# Patient Record
Sex: MALE | Race: WHITE | Employment: FULL TIME | URBAN - METROPOLITAN AREA
[De-identification: names, ages, dates, MRNs, and addresses within clinical notes are randomized per-mention and may not be internally consistent; named-entity substitution may affect disease eponyms.]

---

## 2018-09-10 ENCOUNTER — HOSPITAL ENCOUNTER (EMERGENCY)
Age: 28
Discharge: HOME | End: 2018-09-10
Payer: MEDICARE

## 2018-09-10 ENCOUNTER — APPOINTMENT (OUTPATIENT)
Dept: CT IMAGING | Age: 28
End: 2018-09-10
Payer: MEDICARE

## 2018-09-10 VITALS
HEIGHT: 65 IN | BODY MASS INDEX: 24.16 KG/M2 | HEART RATE: 72 BPM | DIASTOLIC BLOOD PRESSURE: 98 MMHG | OXYGEN SATURATION: 98 % | TEMPERATURE: 98.2 F | RESPIRATION RATE: 16 BRPM | SYSTOLIC BLOOD PRESSURE: 149 MMHG | WEIGHT: 145 LBS

## 2018-09-10 DIAGNOSIS — S09.90XA CLOSED HEAD INJURY, INITIAL ENCOUNTER: Primary | ICD-10-CM

## 2018-09-10 PROCEDURE — 70450 CT HEAD/BRAIN W/O DYE: CPT

## 2018-09-10 PROCEDURE — 99284 EMERGENCY DEPT VISIT MOD MDM: CPT

## 2018-09-10 ASSESSMENT — PAIN DESCRIPTION - LOCATION: LOCATION: HEAD

## 2018-09-10 ASSESSMENT — PAIN DESCRIPTION - FREQUENCY: FREQUENCY: CONTINUOUS

## 2018-09-10 ASSESSMENT — PAIN DESCRIPTION - PAIN TYPE: TYPE: ACUTE PAIN

## 2018-09-10 ASSESSMENT — PAIN DESCRIPTION - ORIENTATION: ORIENTATION: RIGHT

## 2018-09-10 ASSESSMENT — PAIN DESCRIPTION - DESCRIPTORS: DESCRIPTORS: THROBBING

## 2018-09-10 ASSESSMENT — PAIN SCALES - GENERAL: PAINLEVEL_OUTOF10: 4

## 2018-09-10 NOTE — ED PROVIDER NOTES
Interpretation: Normal.    Constitutional: Level of Consciousness: Awake and alert, cooperative. ETOH: No.               Distress: none. Head:  Traumatic:  no.                  Scalp Tenderness:  Right frontal rtegion. Deformity: no.               Skin:  Cephalohematoma small right frontal.  Eyes:  PERRL, EOMI. No periorbital ecchymoses. Conjunctiva: normal.  Ears:  External ears without lesions. Throat:  Airway patient. Neck:  Supple. No midline tenderness, full ROM. Chest:  Symmetrical without visible rash or tenderness. Respiratory:  Clear to auscultation and breath sounds equal.  CV:  Regular rate and rhythm, normal heart sounds, without pathological murmurs. GI:  Abdomen Soft, nontender. No abrasions, ecchymoses, or lacerations. Back:  No costovertebral, paravertebral, intervertebral, or vertebral tenderness or spasm. Integument:  No abrasions, ecchymoses, or lacerations unless noted elsewhere. Extremities  No tenderness or swelling. Normal, painless range of motion. No neurovascular deficit. Neurological:  Oriented x 3,  GCS15. Motor functions intact. Lab / Imaging Results   (All laboratory and radiology results have been personally reviewed by myself)  Labs:  No results found for this visit on 09/10/18. Imaging: All Radiology results interpreted by Radiologist unless otherwise noted. CT Head WO Contrast   Final Result   ALERT:  THIS IS AN ABNORMAL REPORT   1. No CT evidence of acute intracranial abnormality, as questioned. 2. Blastic focus right supraorbital calvarium measuring as above,   thought to be relatively stable when compared with the previous exam,   possibly reflective of bone island/bone an ostosis. Other etiologies   are not completely excluded. Continued surveillance recommended. 3. Mucosal disease bilateral maxillary sinuses. ED Course / Medical Decision Making   Medications - No data to display       Counseling:     The emergency provider has spoken with the patient and discussed todays results, in addition to providing specific details for the plan of care and counseling regarding the diagnosis and prognosis. Questions are answered at this time and they are agreeable with the plan. Assessment      1. Closed head injury, initial encounter      Plan   Discharge to home  Patient condition is good    New Medications     New Prescriptions    No medications on file     Electronically signed by RADU Leiva   DD: 9/10/18  **This report was transcribed using voice recognition software. Every effort was made to ensure accuracy; however, inadvertent computerized transcription errors may be present.   END OF ED PROVIDER NOTE         RADU Moya  09/10/18 [id number]

## 2019-08-17 ENCOUNTER — HOSPITAL ENCOUNTER (EMERGENCY)
Age: 29
Discharge: HOME | End: 2019-08-17
Attending: EMERGENCY MEDICINE

## 2019-08-17 VITALS
BODY MASS INDEX: 23.32 KG/M2 | WEIGHT: 140 LBS | OXYGEN SATURATION: 98 % | SYSTOLIC BLOOD PRESSURE: 139 MMHG | DIASTOLIC BLOOD PRESSURE: 100 MMHG | HEIGHT: 65 IN | TEMPERATURE: 97.3 F | HEART RATE: 76 BPM | RESPIRATION RATE: 17 BRPM

## 2019-08-17 DIAGNOSIS — R13.10 DYSPHAGIA, UNSPECIFIED TYPE: Primary | ICD-10-CM

## 2019-08-17 LAB
ALBUMIN SERPL-MCNC: 5.2 G/DL (ref 3.5–5.2)
ALP BLD-CCNC: 95 U/L (ref 40–129)
ALT SERPL-CCNC: 226 U/L (ref 0–40)
ANION GAP SERPL CALCULATED.3IONS-SCNC: 18 MMOL/L (ref 7–16)
AST SERPL-CCNC: 170 U/L (ref 0–39)
BASOPHILS ABSOLUTE: 0.01 E9/L (ref 0–0.2)
BASOPHILS RELATIVE PERCENT: 0.2 % (ref 0–2)
BILIRUB SERPL-MCNC: 0.9 MG/DL (ref 0–1.2)
BILIRUBIN URINE: ABNORMAL
BLOOD, URINE: NEGATIVE
BUN BLDV-MCNC: 10 MG/DL (ref 6–20)
CALCIUM SERPL-MCNC: 10.5 MG/DL (ref 8.6–10.2)
CHLORIDE BLD-SCNC: 95 MMOL/L (ref 98–107)
CLARITY: CLEAR
CO2: 25 MMOL/L (ref 22–29)
COLOR: YELLOW
CREAT SERPL-MCNC: 0.7 MG/DL (ref 0.7–1.2)
EOSINOPHILS ABSOLUTE: 0.02 E9/L (ref 0.05–0.5)
EOSINOPHILS RELATIVE PERCENT: 0.4 % (ref 0–6)
GFR AFRICAN AMERICAN: >60
GFR NON-AFRICAN AMERICAN: >60 ML/MIN/1.73
GLUCOSE BLD-MCNC: 75 MG/DL (ref 74–99)
GLUCOSE URINE: NEGATIVE MG/DL
HCT VFR BLD CALC: 43.3 % (ref 37–54)
HEMOGLOBIN: 14.7 G/DL (ref 12.5–16.5)
IMMATURE GRANULOCYTES #: 0.01 E9/L
IMMATURE GRANULOCYTES %: 0.2 % (ref 0–5)
KETONES, URINE: >=80 MG/DL
LEUKOCYTE ESTERASE, URINE: NEGATIVE
LYMPHOCYTES ABSOLUTE: 0.86 E9/L (ref 1.5–4)
LYMPHOCYTES RELATIVE PERCENT: 16.4 % (ref 20–42)
MCH RBC QN AUTO: 32.7 PG (ref 26–35)
MCHC RBC AUTO-ENTMCNC: 33.9 % (ref 32–34.5)
MCV RBC AUTO: 96.4 FL (ref 80–99.9)
MONOCYTES ABSOLUTE: 0.5 E9/L (ref 0.1–0.95)
MONOCYTES RELATIVE PERCENT: 9.5 % (ref 2–12)
NEUTROPHILS ABSOLUTE: 3.85 E9/L (ref 1.8–7.3)
NEUTROPHILS RELATIVE PERCENT: 73.3 % (ref 43–80)
NITRITE, URINE: NEGATIVE
PDW BLD-RTO: 12.3 FL (ref 11.5–15)
PH UA: 6 (ref 5–9)
PLATELET # BLD: 153 E9/L (ref 130–450)
PMV BLD AUTO: 9.8 FL (ref 7–12)
POTASSIUM REFLEX MAGNESIUM: 4.4 MMOL/L (ref 3.5–5)
PROTEIN UA: NEGATIVE MG/DL
RBC # BLD: 4.49 E12/L (ref 3.8–5.8)
SODIUM BLD-SCNC: 138 MMOL/L (ref 132–146)
SPECIFIC GRAVITY UA: 1.02 (ref 1–1.03)
TOTAL PROTEIN: 8.5 G/DL (ref 6.4–8.3)
UROBILINOGEN, URINE: 0.2 E.U./DL
WBC # BLD: 5.3 E9/L (ref 4.5–11.5)

## 2019-08-17 PROCEDURE — 96374 THER/PROPH/DIAG INJ IV PUSH: CPT

## 2019-08-17 PROCEDURE — 99283 EMERGENCY DEPT VISIT LOW MDM: CPT

## 2019-08-17 PROCEDURE — 96361 HYDRATE IV INFUSION ADD-ON: CPT

## 2019-08-17 PROCEDURE — 85025 COMPLETE CBC W/AUTO DIFF WBC: CPT

## 2019-08-17 PROCEDURE — 81003 URINALYSIS AUTO W/O SCOPE: CPT

## 2019-08-17 PROCEDURE — 6360000002 HC RX W HCPCS: Performed by: STUDENT IN AN ORGANIZED HEALTH CARE EDUCATION/TRAINING PROGRAM

## 2019-08-17 PROCEDURE — C9113 INJ PANTOPRAZOLE SODIUM, VIA: HCPCS | Performed by: STUDENT IN AN ORGANIZED HEALTH CARE EDUCATION/TRAINING PROGRAM

## 2019-08-17 PROCEDURE — 2580000003 HC RX 258: Performed by: STUDENT IN AN ORGANIZED HEALTH CARE EDUCATION/TRAINING PROGRAM

## 2019-08-17 PROCEDURE — 36415 COLL VENOUS BLD VENIPUNCTURE: CPT

## 2019-08-17 PROCEDURE — 80053 COMPREHEN METABOLIC PANEL: CPT

## 2019-08-17 RX ORDER — 0.9 % SODIUM CHLORIDE 0.9 %
1000 INTRAVENOUS SOLUTION INTRAVENOUS ONCE
Status: COMPLETED | OUTPATIENT
Start: 2019-08-17 | End: 2019-08-17

## 2019-08-17 RX ORDER — PANTOPRAZOLE SODIUM 40 MG/10ML
40 INJECTION, POWDER, LYOPHILIZED, FOR SOLUTION INTRAVENOUS ONCE
Status: COMPLETED | OUTPATIENT
Start: 2019-08-17 | End: 2019-08-17

## 2019-08-17 RX ADMIN — SODIUM CHLORIDE 1000 ML: 9 INJECTION, SOLUTION INTRAVENOUS at 01:54

## 2019-08-17 RX ADMIN — PANTOPRAZOLE SODIUM 40 MG: 40 INJECTION, POWDER, FOR SOLUTION INTRAVENOUS at 01:54

## 2019-08-17 ASSESSMENT — ENCOUNTER SYMPTOMS
TROUBLE SWALLOWING: 1
DIARRHEA: 0
NAUSEA: 0
EYE REDNESS: 0
SHORTNESS OF BREATH: 0
BACK PAIN: 0
SORE THROAT: 0
SINUS PRESSURE: 0
VOMITING: 0
EYE DISCHARGE: 0
ABDOMINAL PAIN: 0
COUGH: 0
EYE PAIN: 0
WHEEZING: 0

## 2019-08-17 ASSESSMENT — PAIN SCALES - GENERAL: PAINLEVEL_OUTOF10: 3

## 2019-08-17 ASSESSMENT — PAIN DESCRIPTION - LOCATION: LOCATION: THROAT

## 2019-08-17 NOTE — ED PROVIDER NOTES
Patient is a 31 yo male that is presenting with dysphagia. Patient states that something was stuck in his throat when he was 19 and since that time he has had episodes where he feels he is choking. He had a clean barium swallow and endoscopy shortly thereafter but since then he has had several other choking episodes. He also has an irritation when he drinks a lot. Because of his issues swallowing he believes he is dehydrated now. He denies chest pain, shortness of breath or abdominal pain. The history is provided by the patient. Review of Systems   Constitutional: Negative for chills and fever. HENT: Positive for trouble swallowing. Negative for ear pain, sinus pressure and sore throat. Eyes: Negative for pain, discharge and redness. Respiratory: Negative for cough, shortness of breath and wheezing. Cardiovascular: Negative for chest pain. Gastrointestinal: Negative for abdominal pain, diarrhea, nausea and vomiting. Genitourinary: Negative for dysuria and frequency. Musculoskeletal: Negative for arthralgias and back pain. Skin: Negative for rash and wound. Neurological: Negative for weakness and headaches. Hematological: Negative for adenopathy. All other systems reviewed and are negative. Physical Exam   Constitutional: He is oriented to person, place, and time. He appears well-developed and well-nourished. HENT:   Head: Normocephalic and atraumatic. Eyes: Pupils are equal, round, and reactive to light. Neck: Normal range of motion. Neck supple. Cardiovascular: Normal rate, regular rhythm and normal heart sounds. Pulmonary/Chest: Effort normal and breath sounds normal. No respiratory distress. He has no wheezes. He has no rales. Abdominal: Soft. Bowel sounds are normal. There is no tenderness. There is no rebound and no guarding. Musculoskeletal: He exhibits no edema. Neurological: He is alert and oriented to person, place, and time. No cranial nerve deficit. Cefazolin    -------------------------------------------------- RESULTS -------------------------------------------------  Labs:  Results for orders placed or performed during the hospital encounter of 08/17/19   CBC Auto Differential   Result Value Ref Range    WBC 5.3 4.5 - 11.5 E9/L    RBC 4.49 3.80 - 5.80 E12/L    Hemoglobin 14.7 12.5 - 16.5 g/dL    Hematocrit 43.3 37.0 - 54.0 %    MCV 96.4 80.0 - 99.9 fL    MCH 32.7 26.0 - 35.0 pg    MCHC 33.9 32.0 - 34.5 %    RDW 12.3 11.5 - 15.0 fL    Platelets 584 003 - 926 E9/L    MPV 9.8 7.0 - 12.0 fL    Neutrophils % 73.3 43.0 - 80.0 %    Immature Granulocytes % 0.2 0.0 - 5.0 %    Lymphocytes % 16.4 (L) 20.0 - 42.0 %    Monocytes % 9.5 2.0 - 12.0 %    Eosinophils % 0.4 0.0 - 6.0 %    Basophils % 0.2 0.0 - 2.0 %    Neutrophils # 3.85 1.80 - 7.30 E9/L    Immature Granulocytes # 0.01 E9/L    Lymphocytes # 0.86 (L) 1.50 - 4.00 E9/L    Monocytes # 0.50 0.10 - 0.95 E9/L    Eosinophils # 0.02 (L) 0.05 - 0.50 E9/L    Basophils # 0.01 0.00 - 0.20 E9/L   Comprehensive Metabolic Panel w/ Reflex to MG   Result Value Ref Range    Sodium 138 132 - 146 mmol/L    Potassium reflex Magnesium 4.4 3.5 - 5.0 mmol/L    Chloride 95 (L) 98 - 107 mmol/L    CO2 25 22 - 29 mmol/L    Anion Gap 18 (H) 7 - 16 mmol/L    Glucose 75 74 - 99 mg/dL    BUN 10 6 - 20 mg/dL    CREATININE 0.7 0.7 - 1.2 mg/dL    GFR Non-African American >60 >=60 mL/min/1.73    GFR African American >60     Calcium 10.5 (H) 8.6 - 10.2 mg/dL    Total Protein 8.5 (H) 6.4 - 8.3 g/dL    Alb 5.2 3.5 - 5.2 g/dL    Total Bilirubin 0.9 0.0 - 1.2 mg/dL    Alkaline Phosphatase 95 40 - 129 U/L     (H) 0 - 40 U/L     (H) 0 - 39 U/L   Urinalysis, reflex to microscopic   Result Value Ref Range    Color, UA Yellow Straw/Yellow    Clarity, UA Clear Clear    Glucose, Ur Negative Negative mg/dL    Bilirubin Urine SMALL (A) Negative    Ketones, Urine >=80 (A) Negative mg/dL    Specific Gravity, UA 1.020 1.005 - 1.030    Blood, Urine Negative Negative    pH, UA 6.0 5.0 - 9.0    Protein, UA Negative Negative mg/dL    Urobilinogen, Urine 0.2 <2.0 E.U./dL    Nitrite, Urine Negative Negative    Leukocyte Esterase, Urine Negative Negative       Radiology:  No orders to display       ------------------------- NURSING NOTES AND VITALS REVIEWED ---------------------------  Date / Time Roomed:  8/17/2019  1:17 AM  ED Bed Assignment:  18B/18B-18    The nursing notes within the ED encounter and vital signs as below have been reviewed. BP (!) 139/100   Pulse 76   Temp 97.3 °F (36.3 °C)   Resp 17   Ht 5' 5\" (1.651 m)   Wt 140 lb (63.5 kg)   SpO2 98%   BMI 23.30 kg/m²   Oxygen Saturation Interpretation: Normal      ------------------------------------------ PROGRESS NOTES ------------------------------------------  2:33 AM  I have spoken with the patient and discussed todays results, in addition to providing specific details for the plan of care and counseling regarding the diagnosis and prognosis. Their questions are answered at this time and they are agreeable with the plan. I discussed at length with them reasons for immediate return here for re evaluation. They will followup with their primary care physician by calling their office tomorrow. --------------------------------- ADDITIONAL PROVIDER NOTES ---------------------------------  At this time the patient is without objective evidence of an acute process requiring hospitalization or inpatient management. They have remained hemodynamically stable throughout their entire ED visit and are stable for discharge with outpatient follow-up. The plan has been discussed in detail and they are aware of the specific conditions for emergent return, as well as the importance of follow-up. New Prescriptions    No medications on file       Diagnosis:  1. Dysphagia, unspecified type        Disposition:  Patient's disposition: Discharge to home  Patient's condition is stable.        Joycelyn Leon,

## 2019-09-12 ENCOUNTER — HOSPITAL ENCOUNTER (EMERGENCY)
Age: 29
Discharge: HOME | End: 2019-09-12

## 2019-09-12 ENCOUNTER — APPOINTMENT (OUTPATIENT)
Dept: CT IMAGING | Age: 29
End: 2019-09-12

## 2019-09-12 VITALS
DIASTOLIC BLOOD PRESSURE: 98 MMHG | RESPIRATION RATE: 14 BRPM | WEIGHT: 145 LBS | BODY MASS INDEX: 24.16 KG/M2 | HEIGHT: 65 IN | OXYGEN SATURATION: 97 % | TEMPERATURE: 98.8 F | HEART RATE: 95 BPM | SYSTOLIC BLOOD PRESSURE: 139 MMHG

## 2019-09-12 DIAGNOSIS — S09.90XA INJURY OF HEAD, INITIAL ENCOUNTER: Primary | ICD-10-CM

## 2019-09-12 DIAGNOSIS — S06.0X0A CONCUSSION WITHOUT LOSS OF CONSCIOUSNESS, INITIAL ENCOUNTER: ICD-10-CM

## 2019-09-12 DIAGNOSIS — S00.03XA HEMATOMA OF SCALP, INITIAL ENCOUNTER: ICD-10-CM

## 2019-09-12 DIAGNOSIS — S16.1XXA ACUTE STRAIN OF NECK MUSCLE, INITIAL ENCOUNTER: ICD-10-CM

## 2019-09-12 PROCEDURE — 70450 CT HEAD/BRAIN W/O DYE: CPT

## 2019-09-12 PROCEDURE — 72125 CT NECK SPINE W/O DYE: CPT

## 2019-09-12 PROCEDURE — 99284 EMERGENCY DEPT VISIT MOD MDM: CPT

## 2019-09-12 RX ORDER — ORPHENADRINE CITRATE 100 MG/1
100 TABLET, EXTENDED RELEASE ORAL 2 TIMES DAILY
Qty: 20 TABLET | Refills: 0 | Status: SHIPPED | OUTPATIENT
Start: 2019-09-12 | End: 2019-09-22

## 2019-09-12 RX ORDER — NAPROXEN 500 MG/1
500 TABLET ORAL 2 TIMES DAILY PRN
Qty: 28 TABLET | Refills: 0 | Status: SHIPPED | OUTPATIENT
Start: 2019-09-12 | End: 2021-01-28

## 2019-09-12 ASSESSMENT — PAIN DESCRIPTION - LOCATION: LOCATION: NECK;HEAD

## 2019-09-12 ASSESSMENT — PAIN DESCRIPTION - DESCRIPTORS: DESCRIPTORS: BURNING

## 2019-09-12 ASSESSMENT — PAIN SCALES - GENERAL: PAINLEVEL_OUTOF10: 7

## 2019-09-12 ASSESSMENT — PAIN DESCRIPTION - PAIN TYPE: TYPE: ACUTE PAIN

## 2021-01-28 ENCOUNTER — HOSPITAL ENCOUNTER (EMERGENCY)
Age: 31
Discharge: HOME | End: 2021-01-28
Payer: MEDICARE

## 2021-01-28 ENCOUNTER — APPOINTMENT (OUTPATIENT)
Dept: CT IMAGING | Age: 31
End: 2021-01-28
Payer: MEDICARE

## 2021-01-28 VITALS
SYSTOLIC BLOOD PRESSURE: 133 MMHG | HEART RATE: 80 BPM | WEIGHT: 140 LBS | BODY MASS INDEX: 23.32 KG/M2 | OXYGEN SATURATION: 100 % | DIASTOLIC BLOOD PRESSURE: 78 MMHG | RESPIRATION RATE: 16 BRPM | TEMPERATURE: 98.7 F | HEIGHT: 65 IN

## 2021-01-28 DIAGNOSIS — F14.10 COCAINE ABUSE (HCC): ICD-10-CM

## 2021-01-28 DIAGNOSIS — R74.8 ELEVATED LIVER ENZYMES: Primary | ICD-10-CM

## 2021-01-28 DIAGNOSIS — F10.10 ALCOHOL ABUSE: ICD-10-CM

## 2021-01-28 LAB
ALBUMIN SERPL-MCNC: 5.1 G/DL (ref 3.5–5.2)
ALP BLD-CCNC: 131 U/L (ref 40–129)
ALT SERPL-CCNC: 253 U/L (ref 0–40)
ANION GAP SERPL CALCULATED.3IONS-SCNC: 11 MMOL/L (ref 7–16)
AST SERPL-CCNC: 213 U/L (ref 0–39)
BASOPHILS ABSOLUTE: 0.01 E9/L (ref 0–0.2)
BASOPHILS RELATIVE PERCENT: 0.3 % (ref 0–2)
BILIRUB SERPL-MCNC: 0.8 MG/DL (ref 0–1.2)
BUN BLDV-MCNC: 5 MG/DL (ref 6–20)
CALCIUM SERPL-MCNC: 10.2 MG/DL (ref 8.6–10.2)
CHLORIDE BLD-SCNC: 96 MMOL/L (ref 98–107)
CO2: 27 MMOL/L (ref 22–29)
CREAT SERPL-MCNC: 0.7 MG/DL (ref 0.7–1.2)
EOSINOPHILS ABSOLUTE: 0.03 E9/L (ref 0.05–0.5)
EOSINOPHILS RELATIVE PERCENT: 0.8 % (ref 0–6)
GFR AFRICAN AMERICAN: >60
GFR NON-AFRICAN AMERICAN: >60 ML/MIN/1.73
GLUCOSE BLD-MCNC: 115 MG/DL (ref 74–99)
HCT VFR BLD CALC: 42.3 % (ref 37–54)
HEMOGLOBIN: 14.7 G/DL (ref 12.5–16.5)
IMMATURE GRANULOCYTES #: 0 E9/L
IMMATURE GRANULOCYTES %: 0 % (ref 0–5)
LYMPHOCYTES ABSOLUTE: 0.61 E9/L (ref 1.5–4)
LYMPHOCYTES RELATIVE PERCENT: 15.6 % (ref 20–42)
MCH RBC QN AUTO: 33.5 PG (ref 26–35)
MCHC RBC AUTO-ENTMCNC: 34.8 % (ref 32–34.5)
MCV RBC AUTO: 96.4 FL (ref 80–99.9)
MONOCYTES ABSOLUTE: 0.55 E9/L (ref 0.1–0.95)
MONOCYTES RELATIVE PERCENT: 14 % (ref 2–12)
NEUTROPHILS ABSOLUTE: 2.72 E9/L (ref 1.8–7.3)
NEUTROPHILS RELATIVE PERCENT: 69.3 % (ref 43–80)
PDW BLD-RTO: 11.5 FL (ref 11.5–15)
PLATELET # BLD: 152 E9/L (ref 130–450)
PMV BLD AUTO: 9.7 FL (ref 7–12)
POTASSIUM SERPL-SCNC: 4.4 MMOL/L (ref 3.5–5)
RBC # BLD: 4.39 E12/L (ref 3.8–5.8)
SODIUM BLD-SCNC: 134 MMOL/L (ref 132–146)
TOTAL PROTEIN: 8.7 G/DL (ref 6.4–8.3)
WBC # BLD: 3.9 E9/L (ref 4.5–11.5)

## 2021-01-28 PROCEDURE — 80053 COMPREHEN METABOLIC PANEL: CPT

## 2021-01-28 PROCEDURE — 85025 COMPLETE CBC W/AUTO DIFF WBC: CPT

## 2021-01-28 PROCEDURE — 99285 EMERGENCY DEPT VISIT HI MDM: CPT

## 2021-01-28 PROCEDURE — 70450 CT HEAD/BRAIN W/O DYE: CPT

## 2021-01-28 PROCEDURE — 36415 COLL VENOUS BLD VENIPUNCTURE: CPT

## 2021-01-28 ASSESSMENT — PAIN DESCRIPTION - ORIENTATION: ORIENTATION: RIGHT;LEFT

## 2021-01-28 ASSESSMENT — PAIN SCALES - GENERAL: PAINLEVEL_OUTOF10: 5

## 2021-01-28 ASSESSMENT — PAIN DESCRIPTION - PROGRESSION: CLINICAL_PROGRESSION: GRADUALLY WORSENING

## 2021-01-28 ASSESSMENT — PAIN DESCRIPTION - LOCATION: LOCATION: HAND

## 2021-01-28 ASSESSMENT — PAIN DESCRIPTION - ONSET: ONSET: SUDDEN

## 2021-01-28 ASSESSMENT — PAIN DESCRIPTION - FREQUENCY: FREQUENCY: CONTINUOUS

## 2021-01-28 ASSESSMENT — PAIN DESCRIPTION - DESCRIPTORS: DESCRIPTORS: ACHING;CONSTANT;DISCOMFORT

## 2021-01-28 NOTE — ED PROVIDER NOTES
[facility]  Department of Emergency Medicine   ED  Encounter Note  Admit Date/RoomTime: 2021  1:53 AM  ED Room:     NAME: Davis Schulz  : 1990  MRN: [id number]     Chief Complaint:  Fatigue (Generalized Weakness), Headache (Pressure), Numbness (BL Hands), and Other (Since August (Seen by PCP Multiple Times; Patient Feels it May Be Drug and Alcohol Related)    HISTORY OF PRESENT ILLNESS        Davis Schulz is a 35 y.o. male who presents to the ED by private vehicle for weakness, sinus pressure, tingling in his hands, beginning 6 months ago. The complaint has been unchanged and are moderate in severity. Patient states that he has been standing by his primary care provider last 6 months over 9 times and has tried several medications without improvement. He states he has had no diagnostics. States nothing makes his symptoms better or worse. He denies headache, trauma, neck pain, chest pain, shortness of breath, cough, abdominal pain, nausea, vomiting, diarrhea, constipation, dysuria, rash. He states that he does sometimes drink 10 beers in a day and does occasional cocaine. He states that he is not interested in rehab at this time. He denies suicidal homicidal ideation. He denies hallucinations. ROS   Pertinent positives and negatives are stated within HPI, all other systems reviewed and are negative. Past Medical History:  has a past medical history of Erectile dysfunction. Surgical History:  has no past surgical history on file. Social History:  reports that he has been smoking cigarettes. He has been smoking about 0.75 packs per day. He has never used smokeless tobacco. He reports current alcohol use of about 105.0 standard drinks of alcohol per week. He reports current drug use. Frequency: 2.00 times per week. Drug: Cocaine. Family History: family history is not on file.      Allergies: Cefazolin    PHYSICAL EXAM   Oxygen Saturation Interpretation: Normal.        ED Triage Vitals [01/28/21 0148]   BP Temp Temp Source Pulse Resp SpO2 Height Weight   (!) 169/106 98.7 °F (37.1 °C) Temporal 94 18 97 % 5' 5\" (1.651 m) 140 lb (63.5 kg)         Physical Exam  Constitutional/General: Alert and oriented x3, well appearing, non toxic. No signs of intoxication. HEENT:  NC/NT. PERRLA,  Airway patent. Neck: Supple, full ROM, non tender to palpation in the midline, no stridor, no crepitus, no meningeal signs  Respiratory: Lungs clear to auscultation bilaterally, no wheezes, rales, or rhonchi. Not in respiratory distress  CV:  Regular rate. Regular rhythm. No murmurs, gallops, or rubs. 2+ distal pulses  Chest: No chest wall tenderness  GI:  Abdomen Soft, Non tender, Non distended. +BS. No rebound, guarding, or rigidity. No pulsatile masses. Musculoskeletal: Moves all extremities x 4. Warm and well perfused, no clubbing, cyanosis, or edema. Capillary refill <3 seconds  Integument: skin warm and dry. No rashes. No jaundice. Lymphatic: no lymphadenopathy noted  Neurologic: GCS 15, no focal deficits, symmetric strength 5/5 in the upper and lower extremities bilaterally  Psychiatric: Normal Affect. Calm and cooperative.       Lab / Imaging Results   (All laboratory and radiology results have been personally reviewed by myself)  Labs:  Results for orders placed or performed during the hospital encounter of 01/28/21   Comprehensive Metabolic Panel   Result Value Ref Range    Sodium 134 132 - 146 mmol/L    Potassium 4.4 3.5 - 5.0 mmol/L    Chloride 96 (L) 98 - 107 mmol/L    CO2 27 22 - 29 mmol/L    Anion Gap 11 7 - 16 mmol/L    Glucose 115 (H) 74 - 99 mg/dL    BUN 5 (L) 6 - 20 mg/dL    CREATININE 0.7 0.7 - 1.2 mg/dL    GFR Non-African American >60 >=60 mL/min/1.73    GFR African American >60     Calcium 10.2 8.6 - 10.2 mg/dL    Total Protein 8.7 (H) 6.4 - 8.3 g/dL    Albumin 5.1 3.5 - 5.2 g/dL    Total Bilirubin 0.8 0.0 - 1.2 mg/dL    Alkaline Phosphatase 131 (H) 40 - 129 U/L     (H) 0 - 40 U/L     (H) 0 - 39 U/L   CBC Auto Differential   Result Value Ref Range    WBC 3.9 (L) 4.5 - 11.5 E9/L    RBC 4.39 3.80 - 5.80 E12/L    Hemoglobin 14.7 12.5 - 16.5 g/dL    Hematocrit 42.3 37.0 - 54.0 %    MCV 96.4 80.0 - 99.9 fL    MCH 33.5 26.0 - 35.0 pg    MCHC 34.8 (H) 32.0 - 34.5 %    RDW 11.5 11.5 - 15.0 fL    Platelets 698 676 - 476 E9/L    MPV 9.7 7.0 - 12.0 fL    Neutrophils % 69.3 43.0 - 80.0 %    Immature Granulocytes % 0.0 0.0 - 5.0 %    Lymphocytes % 15.6 (L) 20.0 - 42.0 %    Monocytes % 14.0 (H) 2.0 - 12.0 %    Eosinophils % 0.8 0.0 - 6.0 %    Basophils % 0.3 0.0 - 2.0 %    Neutrophils Absolute 2.72 1.80 - 7.30 E9/L    Immature Granulocytes # 0.00 E9/L    Lymphocytes Absolute 0.61 (L) 1.50 - 4.00 E9/L    Monocytes Absolute 0.55 0.10 - 0.95 E9/L    Eosinophils Absolute 0.03 (L) 0.05 - 0.50 E9/L    Basophils Absolute 0.01 0.00 - 0.20 E9/L     Imaging: All Radiology results interpreted by Radiologist unless otherwise noted. CT HEAD WO CONTRAST   Final Result   No acute intracranial abnormality. ED Course / Medical Decision Making   Medications - No data to display     Re-Evaluations:  1/28/21      Time: 0325    Patients condition remains stable. Consultations:             None    Procedures:   none    MDM: Patient presented with ongoing symptoms of fatigue, sinus pressure, and numbness to his hands. States he has been evaluated multiple times by his primary care provider. He admits to frequently using alcohol and occasionally using cocaine. He denies any suicidal homicidal ideation. When questioned about requesting rehab he declined. Clinical exam was benign and he appeared well nontoxic. His diagnostics were reviewed and discussed with him. His liver enzymes are chronically elevated. He is not jaundice. Patient states that he has never seen gastroenterology in the past for his elevated liver enzymes.   Patient is appropriate for discharge and outpatient follow-up with primary care and gastroenterology. He is referred to [facility]. He is instructed to discontinue alcohol and drug use. He is instructed to return to the emergency department with any new or worsening symptoms. Plan of Care/Counseling:  I reviewed today's visit with the patient in addition to providing specific details for the plan of care and counseling regarding the diagnosis and prognosis. Questions are answered at this time and are agreeable with the plan. ASSESSMENT     1. Elevated liver enzymes    2. Alcohol abuse    3. Cocaine abuse ([facility] [address]      This patient's ED course included: a personal history and physicial examination  This patient has remained hemodynamically stable during their ED course. PLAN   Discharge home. Patient condition is good. New Medications     New Prescriptions    No medications on file     Electronically signed by ELLEN Abreu CNP   DD: 1/28/21  **This report was transcribed using voice recognition software. Every effort was made to ensure accuracy; however, inadvertent computerized transcription errors may be present.   END OF PROVIDER NOTE     ELLEN Feldman CNP  01/28/21 0407

## 2021-03-10 ENCOUNTER — HOSPITAL ENCOUNTER (OUTPATIENT)
Dept: GENERAL RADIOLOGY | Age: 31
Discharge: HOME | End: 2021-03-12
Payer: MEDICARE

## 2021-03-10 ENCOUNTER — HOSPITAL ENCOUNTER (OUTPATIENT)
Age: 31
Discharge: HOME | End: 2021-03-12
Payer: MEDICARE

## 2021-03-10 ENCOUNTER — HOSPITAL ENCOUNTER (OUTPATIENT)
Age: 31
Discharge: HOME | End: 2021-03-10
Payer: MEDICARE

## 2021-03-10 DIAGNOSIS — M99.01 SEGMENTAL AND SOMATIC DYSFUNCTION OF CERVICAL REGION: ICD-10-CM

## 2021-03-10 LAB
ALBUMIN SERPL-MCNC: 5.2 G/DL (ref 3.5–5.2)
ALP BLD-CCNC: 113 U/L (ref 40–129)
ALT SERPL-CCNC: 282 U/L (ref 0–40)
ANION GAP SERPL CALCULATED.3IONS-SCNC: 13 MMOL/L (ref 7–16)
AST SERPL-CCNC: 460 U/L (ref 0–39)
BASOPHILS ABSOLUTE: 0.01 E9/L (ref 0–0.2)
BASOPHILS RELATIVE PERCENT: 0.3 % (ref 0–2)
BILIRUB SERPL-MCNC: 0.5 MG/DL (ref 0–1.2)
BUN BLDV-MCNC: 5 MG/DL (ref 6–20)
C-REACTIVE PROTEIN: 0.3 MG/DL (ref 0–0.4)
CALCIUM SERPL-MCNC: 10.1 MG/DL (ref 8.6–10.2)
CHLORIDE BLD-SCNC: 100 MMOL/L (ref 98–107)
CO2: 26 MMOL/L (ref 22–29)
CREAT SERPL-MCNC: 0.6 MG/DL (ref 0.7–1.2)
EOSINOPHILS ABSOLUTE: 0.09 E9/L (ref 0.05–0.5)
EOSINOPHILS RELATIVE PERCENT: 2.6 % (ref 0–6)
FOLATE: 9.4 NG/ML (ref 4.8–24.2)
GFR AFRICAN AMERICAN: >60
GFR NON-AFRICAN AMERICAN: >60 ML/MIN/1.73
GLUCOSE BLD-MCNC: 87 MG/DL (ref 74–99)
HCT VFR BLD CALC: 40.5 % (ref 37–54)
HEMOGLOBIN: 13.8 G/DL (ref 12.5–16.5)
IMMATURE GRANULOCYTES #: 0.01 E9/L
IMMATURE GRANULOCYTES %: 0.3 % (ref 0–5)
IRON SATURATION: 34 % (ref 20–55)
IRON: 111 MCG/DL (ref 59–158)
LYMPHOCYTES ABSOLUTE: 0.84 E9/L (ref 1.5–4)
LYMPHOCYTES RELATIVE PERCENT: 23.9 % (ref 20–42)
MCH RBC QN AUTO: 33.8 PG (ref 26–35)
MCHC RBC AUTO-ENTMCNC: 34.1 % (ref 32–34.5)
MCV RBC AUTO: 99.3 FL (ref 80–99.9)
MONOCYTES ABSOLUTE: 0.56 E9/L (ref 0.1–0.95)
MONOCYTES RELATIVE PERCENT: 15.9 % (ref 2–12)
NEUTROPHILS ABSOLUTE: 2.01 E9/L (ref 1.8–7.3)
NEUTROPHILS RELATIVE PERCENT: 57 % (ref 43–80)
PDW BLD-RTO: 12.1 FL (ref 11.5–15)
PLATELET # BLD: 116 E9/L (ref 130–450)
PMV BLD AUTO: 10.2 FL (ref 7–12)
POTASSIUM SERPL-SCNC: 4.3 MMOL/L (ref 3.5–5)
RBC # BLD: 4.08 E12/L (ref 3.8–5.8)
SEDIMENTATION RATE, ERYTHROCYTE: 9 MM/HR (ref 0–15)
SODIUM BLD-SCNC: 139 MMOL/L (ref 132–146)
TOTAL IRON BINDING CAPACITY: 329 MCG/DL (ref 250–450)
TOTAL PROTEIN: 8.4 G/DL (ref 6.4–8.3)
TSH SERPL DL<=0.05 MIU/L-ACNC: 3.24 UIU/ML (ref 0.27–4.2)
VITAMIN B-12: 874 PG/ML (ref 211–946)
VITAMIN D 25-HYDROXY: 18 NG/ML (ref 30–100)
WBC # BLD: 3.5 E9/L (ref 4.5–11.5)

## 2021-03-10 PROCEDURE — 86255 FLUORESCENT ANTIBODY SCREEN: CPT

## 2021-03-10 PROCEDURE — 83516 IMMUNOASSAY NONANTIBODY: CPT

## 2021-03-10 PROCEDURE — 72100 X-RAY EXAM L-S SPINE 2/3 VWS: CPT

## 2021-03-10 PROCEDURE — 83550 IRON BINDING TEST: CPT

## 2021-03-10 PROCEDURE — 82306 VITAMIN D 25 HYDROXY: CPT

## 2021-03-10 PROCEDURE — 85651 RBC SED RATE NONAUTOMATED: CPT

## 2021-03-10 PROCEDURE — 85025 COMPLETE CBC W/AUTO DIFF WBC: CPT

## 2021-03-10 PROCEDURE — 81256 HFE GENE: CPT

## 2021-03-10 PROCEDURE — 82390 ASSAY OF CERULOPLASMIN: CPT

## 2021-03-10 PROCEDURE — 87340 HEPATITIS B SURFACE AG IA: CPT

## 2021-03-10 PROCEDURE — 86038 ANTINUCLEAR ANTIBODIES: CPT

## 2021-03-10 PROCEDURE — 72050 X-RAY EXAM NECK SPINE 4/5VWS: CPT

## 2021-03-10 PROCEDURE — 84443 ASSAY THYROID STIM HORMONE: CPT

## 2021-03-10 PROCEDURE — 86704 HEP B CORE ANTIBODY TOTAL: CPT

## 2021-03-10 PROCEDURE — 82607 VITAMIN B-12: CPT

## 2021-03-10 PROCEDURE — 36415 COLL VENOUS BLD VENIPUNCTURE: CPT

## 2021-03-10 PROCEDURE — 86376 MICROSOMAL ANTIBODY EACH: CPT

## 2021-03-10 PROCEDURE — 83540 ASSAY OF IRON: CPT

## 2021-03-10 PROCEDURE — 80053 COMPREHEN METABOLIC PANEL: CPT

## 2021-03-10 PROCEDURE — 86706 HEP B SURFACE ANTIBODY: CPT

## 2021-03-10 PROCEDURE — 86703 HIV-1/HIV-2 1 RESULT ANTBDY: CPT

## 2021-03-10 PROCEDURE — 86803 HEPATITIS C AB TEST: CPT

## 2021-03-10 PROCEDURE — 82103 ALPHA-1-ANTITRYPSIN TOTAL: CPT

## 2021-03-10 PROCEDURE — 82746 ASSAY OF FOLIC ACID SERUM: CPT

## 2021-03-10 PROCEDURE — 86140 C-REACTIVE PROTEIN: CPT

## 2021-03-11 LAB
ANTI-NUCLEAR ANTIBODY (ANA): NEGATIVE
CERULOPLASMIN: 21 MG/DL (ref 15–30)
HBV SURFACE AB TITR SER: REACTIVE {TITER}
HEPATITIS B CORE TOTAL ANTIBODY: NONREACTIVE
HEPATITIS B SURFACE ANTIGEN INTERPRETATION: NORMAL
HIV-1 AND HIV-2 ANTIBODIES: NORMAL

## 2021-03-12 LAB
ALPHA-1 ANTITRYPSIN: 132 MG/DL (ref 90–200)
ANTI-MITOCHONDRIAL AB, IFA: NEGATIVE
HEPATITIS C ANTIBODY INTERPRETATION: NORMAL
SMOOTH MUSCLE ANTIBODY: NEGATIVE

## 2021-03-13 LAB
GLIADIN ANTIBODIES IGA: 9 UNITS (ref 0–19)
GLIADIN ANTIBODIES IGG: 1 UNITS (ref 0–19)
LIVER-KIDNEY MICROSOME-1 AB IGG: 0.9 U (ref 0–24.9)
TISSUE TRANSGLUTAMINASE ANTIBODY: 4 U/ML (ref 0–5)
TISSUE TRANSGLUTAMINASE IGA: <2 U/ML (ref 0–3)

## 2021-03-17 LAB
C282Y HEMOCHROMATOSIS MUT: NEGATIVE
H63D HEMOCHROMATOSIS MUT: NORMAL
HEMOCHROMATOSIS GENE ANALYSIS: NORMAL
HFE PCR SPECIMEN: NORMAL
S65C HEMOCHROMATOSIS MUT: NEGATIVE

## 2021-05-02 ENCOUNTER — APPOINTMENT (OUTPATIENT)
Dept: GENERAL RADIOLOGY | Age: 31
End: 2021-05-02
Payer: MEDICARE

## 2021-05-02 ENCOUNTER — HOSPITAL ENCOUNTER (EMERGENCY)
Age: 31
Discharge: HOME | End: 2021-05-02
Attending: EMERGENCY MEDICINE
Payer: MEDICARE

## 2021-05-02 VITALS
SYSTOLIC BLOOD PRESSURE: 139 MMHG | DIASTOLIC BLOOD PRESSURE: 94 MMHG | BODY MASS INDEX: 22.49 KG/M2 | HEIGHT: 65 IN | RESPIRATION RATE: 14 BRPM | TEMPERATURE: 98.2 F | OXYGEN SATURATION: 97 % | HEART RATE: 86 BPM | WEIGHT: 135 LBS

## 2021-05-02 DIAGNOSIS — R07.9 CHEST PAIN, UNSPECIFIED TYPE: Primary | ICD-10-CM

## 2021-05-02 DIAGNOSIS — K02.9 PAIN DUE TO DENTAL CARIES: ICD-10-CM

## 2021-05-02 LAB
AMPHETAMINE SCREEN, URINE: NOT DETECTED
ANION GAP SERPL CALCULATED.3IONS-SCNC: 13 MMOL/L (ref 7–16)
BARBITURATE SCREEN URINE: NOT DETECTED
BASOPHILS ABSOLUTE: 0.02 E9/L (ref 0–0.2)
BASOPHILS RELATIVE PERCENT: 0.4 % (ref 0–2)
BENZODIAZEPINE SCREEN, URINE: NOT DETECTED
BUN BLDV-MCNC: 10 MG/DL (ref 6–20)
CALCIUM SERPL-MCNC: 9.6 MG/DL (ref 8.6–10.2)
CANNABINOID SCREEN URINE: NOT DETECTED
CHLORIDE BLD-SCNC: 100 MMOL/L (ref 98–107)
CO2: 26 MMOL/L (ref 22–29)
COCAINE METABOLITE SCREEN URINE: NOT DETECTED
CREAT SERPL-MCNC: 0.5 MG/DL (ref 0.7–1.2)
EOSINOPHILS ABSOLUTE: 0.09 E9/L (ref 0.05–0.5)
EOSINOPHILS RELATIVE PERCENT: 1.7 % (ref 0–6)
FENTANYL SCREEN, URINE: NOT DETECTED
GFR AFRICAN AMERICAN: >60
GFR NON-AFRICAN AMERICAN: >60 ML/MIN/1.73
GLUCOSE BLD-MCNC: 117 MG/DL (ref 74–99)
HCT VFR BLD CALC: 42.3 % (ref 37–54)
HEMOGLOBIN: 14.2 G/DL (ref 12.5–16.5)
IMMATURE GRANULOCYTES #: 0.01 E9/L
IMMATURE GRANULOCYTES %: 0.2 % (ref 0–5)
LYMPHOCYTES ABSOLUTE: 1.5 E9/L (ref 1.5–4)
LYMPHOCYTES RELATIVE PERCENT: 28.4 % (ref 20–42)
Lab: NORMAL
MCH RBC QN AUTO: 33.4 PG (ref 26–35)
MCHC RBC AUTO-ENTMCNC: 33.6 % (ref 32–34.5)
MCV RBC AUTO: 99.5 FL (ref 80–99.9)
METHADONE SCREEN, URINE: NOT DETECTED
MONOCYTES ABSOLUTE: 0.71 E9/L (ref 0.1–0.95)
MONOCYTES RELATIVE PERCENT: 13.4 % (ref 2–12)
NEUTROPHILS ABSOLUTE: 2.95 E9/L (ref 1.8–7.3)
NEUTROPHILS RELATIVE PERCENT: 55.9 % (ref 43–80)
OPIATE SCREEN URINE: NOT DETECTED
OXYCODONE URINE: NOT DETECTED
PDW BLD-RTO: 11.9 FL (ref 11.5–15)
PHENCYCLIDINE SCREEN URINE: NOT DETECTED
PLATELET # BLD: 146 E9/L (ref 130–450)
PMV BLD AUTO: 9.4 FL (ref 7–12)
POTASSIUM REFLEX MAGNESIUM: 5 MMOL/L (ref 3.5–5)
RBC # BLD: 4.25 E12/L (ref 3.8–5.8)
SODIUM BLD-SCNC: 139 MMOL/L (ref 132–146)
TROPONIN: <0.01 NG/ML (ref 0–0.03)
WBC # BLD: 5.3 E9/L (ref 4.5–11.5)

## 2021-05-02 PROCEDURE — 84484 ASSAY OF TROPONIN QUANT: CPT

## 2021-05-02 PROCEDURE — 85025 COMPLETE CBC W/AUTO DIFF WBC: CPT

## 2021-05-02 PROCEDURE — 96374 THER/PROPH/DIAG INJ IV PUSH: CPT

## 2021-05-02 PROCEDURE — 99283 EMERGENCY DEPT VISIT LOW MDM: CPT

## 2021-05-02 PROCEDURE — 71045 X-RAY EXAM CHEST 1 VIEW: CPT

## 2021-05-02 PROCEDURE — 80048 BASIC METABOLIC PNL TOTAL CA: CPT

## 2021-05-02 PROCEDURE — 6360000002 HC RX W HCPCS: Performed by: STUDENT IN AN ORGANIZED HEALTH CARE EDUCATION/TRAINING PROGRAM

## 2021-05-02 PROCEDURE — 80307 DRUG TEST PRSMV CHEM ANLYZR: CPT

## 2021-05-02 RX ORDER — AMOXICILLIN 500 MG/1
500 CAPSULE ORAL 3 TIMES DAILY
Qty: 21 CAPSULE | Refills: 0 | Status: SHIPPED | OUTPATIENT
Start: 2021-05-02 | End: 2021-05-09

## 2021-05-02 RX ORDER — KETOROLAC TROMETHAMINE 30 MG/ML
15 INJECTION, SOLUTION INTRAMUSCULAR; INTRAVENOUS ONCE
Status: COMPLETED | OUTPATIENT
Start: 2021-05-02 | End: 2021-05-02

## 2021-05-02 RX ADMIN — KETOROLAC TROMETHAMINE 15 MG: 30 INJECTION, SOLUTION INTRAMUSCULAR; INTRAVENOUS at 10:02

## 2021-05-02 ASSESSMENT — ENCOUNTER SYMPTOMS
ABDOMINAL PAIN: 0
NAUSEA: 0
COLOR CHANGE: 0
SHORTNESS OF BREATH: 1
WHEEZING: 0
VOMITING: 0
DIARRHEA: 0
EYE PAIN: 0
FACIAL SWELLING: 0
RHINORRHEA: 0
COUGH: 0

## 2021-05-02 ASSESSMENT — PAIN SCALES - GENERAL: PAINLEVEL_OUTOF10: 7

## 2021-05-02 ASSESSMENT — PAIN DESCRIPTION - ORIENTATION: ORIENTATION: LEFT

## 2021-05-02 NOTE — ED PROVIDER NOTES
[facility] Emergency Room          Pt Name: Tu Roberson  MRN: [id number]  Candacegftoby 1990  Date of evaluation: 5/2/2021      CHIEF COMPLAINT  Chief Complaint   Patient presents with    Chest Pain     pt having left sided chest pain for years but has gotten worse in the last few days. states he has been leading a rough lifestyle for the past few years now. no heart history. having pain in gums, needs surgery that is scheduled in june 6977 Tyson De La Fuente is a 35 y.o. male presenting to the ED with chief complaint of chest pain. Patient chest pain is located on the left side of his chest and radiates down his left arm. He admits to paresthesias to left arm. Patient states he uses cocaine and last use was Sunday. He states he snorts it. He denies any cardiac history or family history of heart disease. His pain is been constant in his chest described as a cramping/pressure for over 3 weeks now. Patient also has complaints of shortness of breath. He denies any pleuritic pain. His other complaint is dental pain. He states he follows with the dentist to restart 1 week ago for the same complaint and presentation of \"infection of his gums. Did not start him on antibiotic. He states it is posterior right gumline is smaller that is tender. Patient's complains have been constant without any alleviating or exacerbating factors and mild in severity. 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 HENT: Positive for dental problem. Negative for congestion, facial swelling and rhinorrhea.     Eyes: Negative for pain and visual disturbance. Respiratory: Positive for shortness of breath. Negative for cough and wheezing. Cardiovascular: Positive for chest pain. Negative for palpitations and leg swelling. Gastrointestinal: Negative for abdominal pain, diarrhea, nausea and vomiting. Genitourinary: Negative for dysuria, frequency and hematuria. Musculoskeletal: Negative for arthralgias and neck pain. Skin: Negative for color change and rash. Neurological: Positive for numbness. Negative for dizziness, weakness and headaches. Physical Exam  Vitals signs and nursing note reviewed. Constitutional:       General: He is not in acute distress. Appearance: He is well-developed. HENT:      Head: Normocephalic and atraumatic. Nose: Nose normal.      Mouth/Throat:      Pharynx: Oropharynx is clear. Eyes:      Conjunctiva/sclera: Conjunctivae normal.      Pupils: Pupils are equal, round, and reactive to light. Neck:      Musculoskeletal: Normal range of motion. No neck rigidity. Cardiovascular:      Rate and Rhythm: Normal rate and regular rhythm. Heart sounds: Normal heart sounds. Pulmonary:      Effort: Pulmonary effort is normal. No respiratory distress. Breath sounds: Normal breath sounds. No wheezing, rhonchi or rales. Chest:      Chest wall: No tenderness. Abdominal:      General: Abdomen is flat. There is no distension. Palpations: Abdomen is soft. There is no mass. Tenderness: There is no abdominal tenderness. Skin:     General: Skin is warm and dry. Findings: No rash. Neurological:      Mental Status: He is alert. Mental status is at baseline. Sensory: Sensation is intact. Motor: Motor function is intact. No weakness. Comments: Neurovascular intact in left upper extremity. Radial pulse 2+          Procedures     MDM           Patient presents to the ED for evaluation. This patient was seen and evaluated with the attending.   Work-up was performed with concerns for but not limited to ACS, pneumonia, musculoskeletal pain, dental abscess, drug-induced chest pain. Patient is a low risk heart score and his pain was relieved with Toradol. Chest x-ray and troponin were not elevated. EKG was without STEMI. Patient was advised to follow-up with his primary care physician and given her strict return precautions. He was given amoxicillin to go home on for his infection in his gums. Matt Hammond --------------------------------------------- PAST HISTORY ---------------------------------------------  Past Medical History:  has a past medical history of Arrhythmia and Erectile dysfunction. Past Surgical History:  has no past surgical history on file. Social History:  reports that he has been smoking cigarettes. He has been smoking about 0.75 packs per day. He has never used smokeless tobacco. He reports current alcohol use of about 15.0 standard drinks of alcohol per week. He reports current drug use. Frequency: 2.00 times per week. Drug: Cocaine. Family History: family history is not on file. The patients home medications have been reviewed.     Allergies: Cefazolin    -------------------------------------------------- RESULTS -------------------------------------------------  Labs:  Results for orders placed or performed during the hospital encounter of 05/02/21   CBC Auto Differential   Result Value Ref Range    WBC 5.3 4.5 - 11.5 E9/L    RBC 4.25 3.80 - 5.80 E12/L    Hemoglobin 14.2 12.5 - 16.5 g/dL    Hematocrit 42.3 37.0 - 54.0 %    MCV 99.5 80.0 - 99.9 fL    MCH 33.4 26.0 - 35.0 pg    MCHC 33.6 32.0 - 34.5 %    RDW 11.9 11.5 - 15.0 fL    Platelets 851 024 - 977 E9/L    MPV 9.4 7.0 - 12.0 fL    Neutrophils % 55.9 43.0 - 80.0 %    Immature Granulocytes % 0.2 0.0 - 5.0 %    Lymphocytes % 28.4 20.0 - 42.0 %    Monocytes % 13.4 (H) 2.0 - 12.0 %    Eosinophils % 1.7 0.0 - 6.0 %    Basophils % 0.4 0.0 - 2.0 %    Neutrophils Absolute 2.95 1.80 - 7.30 E9/L    Immature Granulocytes # 0.01 E9/L    Lymphocytes Absolute 1.50 1.50 - 4.00 E9/L    Monocytes Absolute 0.71 0.10 - 0.95 E9/L    Eosinophils Absolute 0.09 0.05 - 0.50 E9/L    Basophils Absolute 0.02 0.00 - 0.20 E9/L   Troponin   Result Value Ref Range    Troponin <0.01 0.00 - 0.03 ng/mL   Basic Metabolic Panel w/ Reflex to MG   Result Value Ref Range    Sodium 139 132 - 146 mmol/L    Potassium reflex Magnesium 5.0 3.5 - 5.0 mmol/L    Chloride 100 98 - 107 mmol/L    CO2 26 22 - 29 mmol/L    Anion Gap 13 7 - 16 mmol/L    Glucose 117 (H) 74 - 99 mg/dL    BUN 10 6 - 20 mg/dL    CREATININE 0.5 (L) 0.7 - 1.2 mg/dL    GFR Non-African American >60 >=60 mL/min/1.73    GFR African American >60     Calcium 9.6 8.6 - 10.2 mg/dL   Urine Drug Screen   Result Value Ref Range    Amphetamine Screen, Urine NOT DETECTED Negative <1000 ng/mL    Barbiturate Screen, Ur NOT DETECTED Negative < 200 ng/mL    Benzodiazepine Screen, Urine NOT DETECTED Negative < 200 ng/mL    Cannabinoid Scrn, Ur NOT DETECTED Negative < 50ng/mL    Cocaine Metabolite Screen, Urine NOT DETECTED Negative < 300 ng/mL    Opiate Scrn, Ur NOT DETECTED Negative < 300ng/mL    PCP Screen, Urine NOT DETECTED Negative < 25 ng/mL    Methadone Screen, Urine NOT DETECTED Negative <300 ng/mL    Oxycodone Urine NOT DETECTED Negative <100 ng/mL    FENTANYL SCREEN, URINE NOT DETECTED Negative <1 ng/mL    Drug Screen Comment: see below        Radiology:  XR CHEST PORTABLE   Final Result   No acute pulmonary process             ------------------------- NURSING NOTES AND VITALS REVIEWED ---------------------------  Date / Time Roomed:  5/2/2021  8:48 AM  ED Bed Assignment:  16/16    The nursing notes within the ED encounter and vital signs as below have been reviewed.    BP (!) 139/94   Pulse 86   Temp 98.2 °F (36.8 °C)   Resp 14   Ht 5' 5\" (1.651 m)   Wt 135 lb (61.2 kg)   SpO2 97%   BMI 22.47 kg/m²           --------------------------------- ADDITIONAL PROVIDER NOTES ---------------------------------  At this time the patient is without objective evidence of an acute process requiring hospitalization or inpatient management. They have remained hemodynamically stable throughout their entire ED visit and are stable for discharge with outpatient follow-up. The plan has been discussed in detail and they are aware of the specific conditions for emergent return, as well as the importance of follow-up. Discharge Medication List as of 5/2/2021 10:22 AM      START taking these medications    Details   amoxicillin (AMOXIL) 500 MG capsule Take 1 capsule by mouth 3 times daily for 7 days, Disp-21 capsule, R-0Print             Diagnosis:  1. Chest pain, unspecified type    2. Pain due to dental caries        Disposition:  Patient's disposition: Discharge  Patient's condition is stable. NOTE:  This report was transcribed using voice recognition software. Efforts were made to ensure accuracy; however, inadvertent computerized transcription errors may be present.                   Niraj Aguilar, DO  Resident  05/03/21 0808

## 2021-06-05 ENCOUNTER — HOSPITAL ENCOUNTER (EMERGENCY)
Age: 31
Discharge: HOME | End: 2021-06-05
Attending: EMERGENCY MEDICINE
Payer: MEDICARE

## 2021-06-05 VITALS
BODY MASS INDEX: 22.47 KG/M2 | HEART RATE: 88 BPM | RESPIRATION RATE: 16 BRPM | SYSTOLIC BLOOD PRESSURE: 148 MMHG | HEIGHT: 65 IN | OXYGEN SATURATION: 99 % | DIASTOLIC BLOOD PRESSURE: 88 MMHG | TEMPERATURE: 98 F

## 2021-06-05 DIAGNOSIS — I10 HYPERTENSION, UNSPECIFIED TYPE: ICD-10-CM

## 2021-06-05 DIAGNOSIS — F10.930 ALCOHOL WITHDRAWAL SYNDROME WITHOUT COMPLICATION (HCC): Primary | ICD-10-CM

## 2021-06-05 LAB
ALBUMIN SERPL-MCNC: 5.1 G/DL (ref 3.5–5.2)
ALP BLD-CCNC: 131 U/L (ref 40–129)
ALT SERPL-CCNC: 308 U/L (ref 0–40)
ANION GAP SERPL CALCULATED.3IONS-SCNC: 10 MMOL/L (ref 7–16)
AST SERPL-CCNC: 652 U/L (ref 0–39)
BASOPHILS ABSOLUTE: 0.01 E9/L (ref 0–0.2)
BASOPHILS RELATIVE PERCENT: 0.2 % (ref 0–2)
BILIRUB SERPL-MCNC: 1.6 MG/DL (ref 0–1.2)
BUN BLDV-MCNC: 9 MG/DL (ref 6–20)
CALCIUM SERPL-MCNC: 10.4 MG/DL (ref 8.6–10.2)
CHLORIDE BLD-SCNC: 97 MMOL/L (ref 98–107)
CO2: 29 MMOL/L (ref 22–29)
CREAT SERPL-MCNC: 0.6 MG/DL (ref 0.7–1.2)
EKG ATRIAL RATE: 89 BPM
EKG P AXIS: 65 DEGREES
EKG P-R INTERVAL: 154 MS
EKG Q-T INTERVAL: 378 MS
EKG QRS DURATION: 98 MS
EKG QTC CALCULATION (BAZETT): 459 MS
EKG R AXIS: 45 DEGREES
EKG T AXIS: 33 DEGREES
EKG VENTRICULAR RATE: 89 BPM
EOSINOPHILS ABSOLUTE: 0.07 E9/L (ref 0.05–0.5)
EOSINOPHILS RELATIVE PERCENT: 1.6 % (ref 0–6)
GFR AFRICAN AMERICAN: >60
GFR NON-AFRICAN AMERICAN: >60 ML/MIN/1.73
GLUCOSE BLD-MCNC: 80 MG/DL (ref 74–99)
HCT VFR BLD CALC: 38.4 % (ref 37–54)
HEMOGLOBIN: 13.1 G/DL (ref 12.5–16.5)
IMMATURE GRANULOCYTES #: 0.02 E9/L
IMMATURE GRANULOCYTES %: 0.5 % (ref 0–5)
LYMPHOCYTES ABSOLUTE: 0.57 E9/L (ref 1.5–4)
LYMPHOCYTES RELATIVE PERCENT: 13.1 % (ref 20–42)
MCH RBC QN AUTO: 33.2 PG (ref 26–35)
MCHC RBC AUTO-ENTMCNC: 34.1 % (ref 32–34.5)
MCV RBC AUTO: 97.5 FL (ref 80–99.9)
MONOCYTES ABSOLUTE: 0.43 E9/L (ref 0.1–0.95)
MONOCYTES RELATIVE PERCENT: 9.9 % (ref 2–12)
NEUTROPHILS ABSOLUTE: 3.25 E9/L (ref 1.8–7.3)
NEUTROPHILS RELATIVE PERCENT: 74.7 % (ref 43–80)
PDW BLD-RTO: 11.5 FL (ref 11.5–15)
PLATELET # BLD: 89 E9/L (ref 130–450)
PLATELET CONFIRMATION: NORMAL
PMV BLD AUTO: 10.9 FL (ref 7–12)
POTASSIUM REFLEX MAGNESIUM: 4.3 MMOL/L (ref 3.5–5)
RBC # BLD: 3.94 E12/L (ref 3.8–5.8)
SODIUM BLD-SCNC: 136 MMOL/L (ref 132–146)
TOTAL PROTEIN: 8.6 G/DL (ref 6.4–8.3)
WBC # BLD: 4.4 E9/L (ref 4.5–11.5)

## 2021-06-05 PROCEDURE — 93010 ELECTROCARDIOGRAM REPORT: CPT | Performed by: INTERNAL MEDICINE

## 2021-06-05 PROCEDURE — 96375 TX/PRO/DX INJ NEW DRUG ADDON: CPT

## 2021-06-05 PROCEDURE — 85025 COMPLETE CBC W/AUTO DIFF WBC: CPT

## 2021-06-05 PROCEDURE — 96374 THER/PROPH/DIAG INJ IV PUSH: CPT

## 2021-06-05 PROCEDURE — 80053 COMPREHEN METABOLIC PANEL: CPT

## 2021-06-05 PROCEDURE — 36415 COLL VENOUS BLD VENIPUNCTURE: CPT

## 2021-06-05 PROCEDURE — 6360000002 HC RX W HCPCS: Performed by: STUDENT IN AN ORGANIZED HEALTH CARE EDUCATION/TRAINING PROGRAM

## 2021-06-05 PROCEDURE — 99284 EMERGENCY DEPT VISIT MOD MDM: CPT

## 2021-06-05 PROCEDURE — 2580000003 HC RX 258: Performed by: STUDENT IN AN ORGANIZED HEALTH CARE EDUCATION/TRAINING PROGRAM

## 2021-06-05 PROCEDURE — 93005 ELECTROCARDIOGRAM TRACING: CPT | Performed by: STUDENT IN AN ORGANIZED HEALTH CARE EDUCATION/TRAINING PROGRAM

## 2021-06-05 PROCEDURE — 2500000003 HC RX 250 WO HCPCS: Performed by: STUDENT IN AN ORGANIZED HEALTH CARE EDUCATION/TRAINING PROGRAM

## 2021-06-05 RX ORDER — 0.9 % SODIUM CHLORIDE 0.9 %
1000 INTRAVENOUS SOLUTION INTRAVENOUS ONCE
Status: COMPLETED | OUTPATIENT
Start: 2021-06-05 | End: 2021-06-05

## 2021-06-05 RX ORDER — CHLORDIAZEPOXIDE HYDROCHLORIDE 25 MG/1
CAPSULE, GELATIN COATED ORAL
Qty: 15 CAPSULE | Refills: 0 | Status: SHIPPED | OUTPATIENT
Start: 2021-06-05 | End: 2021-06-09

## 2021-06-05 RX ORDER — PHENOBARBITAL SODIUM 65 MG/ML
130 INJECTION INTRAMUSCULAR ONCE
Status: COMPLETED | OUTPATIENT
Start: 2021-06-05 | End: 2021-06-05

## 2021-06-05 RX ORDER — CHLORDIAZEPOXIDE HYDROCHLORIDE 10 MG/1
10 CAPSULE, GELATIN COATED ORAL
COMMUNITY
End: 2021-06-05 | Stop reason: ALTCHOICE

## 2021-06-05 RX ORDER — FOLIC ACID 5 MG/ML
1 INJECTION, SOLUTION INTRAMUSCULAR; INTRAVENOUS; SUBCUTANEOUS DAILY
Status: DISCONTINUED | OUTPATIENT
Start: 2021-06-05 | End: 2021-06-05 | Stop reason: HOSPADM

## 2021-06-05 RX ORDER — THIAMINE HYDROCHLORIDE 100 MG/ML
100 INJECTION, SOLUTION INTRAMUSCULAR; INTRAVENOUS DAILY
Status: DISCONTINUED | OUTPATIENT
Start: 2021-06-05 | End: 2021-06-05 | Stop reason: HOSPADM

## 2021-06-05 RX ADMIN — THIAMINE HYDROCHLORIDE 100 MG: 100 INJECTION, SOLUTION INTRAMUSCULAR; INTRAVENOUS at 08:00

## 2021-06-05 RX ADMIN — FOLIC ACID 1 MG: 5 INJECTION, SOLUTION INTRAMUSCULAR; INTRAVENOUS; SUBCUTANEOUS at 08:11

## 2021-06-05 RX ADMIN — PHENOBARBITAL SODIUM 130 MG: 65 INJECTION INTRAMUSCULAR at 08:00

## 2021-06-05 RX ADMIN — SODIUM CHLORIDE 1000 ML: 9 INJECTION, SOLUTION INTRAVENOUS at 08:00

## 2021-06-05 ASSESSMENT — ENCOUNTER SYMPTOMS
COUGH: 0
SORE THROAT: 0
BACK PAIN: 0
ABDOMINAL PAIN: 0
NAUSEA: 0
PHOTOPHOBIA: 0
SHORTNESS OF BREATH: 0

## 2021-06-05 NOTE — ED PROVIDER NOTES
78-year-old male with no significant past medical history presenting with chief complaint of alcohol withdrawal.  Patient's last drink was Wednesday morning, and on Thursday he had a seizure and was seen at hospital in [place] where he was observed overnight and placed on Librium. He had scans done at that time which were negative. Patient has been taking Librium as prescribed, last dose at midnight, but states that all night he has been unable to sleep. He states he is feeling anxious and restless, and mother notes that he seems to be having some memory problems like he did before he had his last seizure. Previously, patient was drinking on average 10 shots of liquor and 5-10 beers per day for up to a year. He currently complains of a headache. He denies any fever, chills, chest pain, shortness of breath, nausea, dizziness, and lightheadedness. Alcohol Intoxication  Similar prior episodes: yes    Severity:  Mild  Onset quality:  Gradual  Timing:  Intermittent  Suspected agents:  Alcohol  Associated symptoms: headaches    Associated symptoms: no abdominal pain, no nausea and no shortness of breath         Review of Systems   Constitutional: Negative for chills and fever. HENT: Negative for congestion and sore throat. Eyes: Negative for photophobia and visual disturbance. Respiratory: Negative for cough and shortness of breath. Cardiovascular: Negative for chest pain. Gastrointestinal: Negative for abdominal pain and nausea. Genitourinary: Negative for dysuria and frequency. Musculoskeletal: Negative for back pain and neck pain. Skin: Negative for rash and wound. Neurological: Positive for headaches. Psychiatric/Behavioral: The patient is nervous/anxious. Physical Exam  Constitutional:       General: He is not in acute distress. Appearance: He is not ill-appearing or toxic-appearing.       Comments: Patient sitting up in bed, no acute distress   HENT:      Head: Normocephalic and atraumatic. Eyes:      General: No scleral icterus. Extraocular Movements: Extraocular movements intact. Conjunctiva/sclera: Conjunctivae normal.      Pupils: Pupils are equal, round, and reactive to light. Cardiovascular:      Rate and Rhythm: Regular rhythm. Tachycardia present. Pulmonary:      Effort: Pulmonary effort is normal.      Breath sounds: Normal breath sounds. Abdominal:      General: Abdomen is flat. There is no distension. Palpations: Abdomen is soft. Tenderness: There is no abdominal tenderness. Musculoskeletal:         General: No swelling or tenderness. Cervical back: Normal range of motion and neck supple. Skin:     General: Skin is warm and dry. Neurological:      General: No focal deficit present. Mental Status: He is alert. Cranial Nerves: No cranial nerve deficit. Psychiatric:         Mood and Affect: Mood normal.         Behavior: Behavior normal.      Comments: Appears anxious          Procedures     MDM  Number of Diagnoses or Management Options  Alcohol withdrawal syndrome without complication ([facility]  Hypertension, unspecified type  Diagnosis management comments: 76-year-old male presenting for concern for alcohol withdrawal.  Vital signs stable, no acute distress. Labs unremarkable. Patient given thiamine and folate in the ED. He is on 10 mg of Librium currently. Discussed detox and admission with patient, but he prefers to be discharged at this time. He was noted to be hypertensive, and patient stated that he has been having troubles with blood pressures for a while. I discussed the need to follow-up with his PCP for this. At this time, he is felt stable for discharge.   He was prescribed increased dose of Librium and discharged with strict return precautions.                  --------------------------------------------- PAST HISTORY ---------------------------------------------  Past Medical History:  has a past medical history of Arrhythmia and Erectile dysfunction. Past Surgical History:  has no past surgical history on file. Social History:  reports that he has been smoking cigarettes. He has been smoking about 0.75 packs per day. He has never used smokeless tobacco. He reports current alcohol use of about 15.0 standard drinks of alcohol per week. He reports current drug use. Frequency: 2.00 times per week. Drug: Cocaine. Family History: family history is not on file. The patients home medications have been reviewed.     Allergies: Cefazolin    -------------------------------------------------- RESULTS -------------------------------------------------  Labs:  Results for orders placed or performed during the hospital encounter of 06/05/21   CBC Auto Differential   Result Value Ref Range    WBC 4.4 (L) 4.5 - 11.5 E9/L    RBC 3.94 3.80 - 5.80 E12/L    Hemoglobin 13.1 12.5 - 16.5 g/dL    Hematocrit 38.4 37.0 - 54.0 %    MCV 97.5 80.0 - 99.9 fL    MCH 33.2 26.0 - 35.0 pg    MCHC 34.1 32.0 - 34.5 %    RDW 11.5 11.5 - 15.0 fL    Platelets 89 (L) 724 - 450 E9/L    MPV 10.9 7.0 - 12.0 fL    Neutrophils % 74.7 43.0 - 80.0 %    Immature Granulocytes % 0.5 0.0 - 5.0 %    Lymphocytes % 13.1 (L) 20.0 - 42.0 %    Monocytes % 9.9 2.0 - 12.0 %    Eosinophils % 1.6 0.0 - 6.0 %    Basophils % 0.2 0.0 - 2.0 %    Neutrophils Absolute 3.25 1.80 - 7.30 E9/L    Immature Granulocytes # 0.02 E9/L    Lymphocytes Absolute 0.57 (L) 1.50 - 4.00 E9/L    Monocytes Absolute 0.43 0.10 - 0.95 E9/L    Eosinophils Absolute 0.07 0.05 - 0.50 E9/L    Basophils Absolute 0.01 0.00 - 0.20 E9/L   Comprehensive Metabolic Panel w/ Reflex to MG   Result Value Ref Range    Sodium 136 132 - 146 mmol/L    Potassium reflex Magnesium 4.3 3.5 - 5.0 mmol/L    Chloride 97 (L) 98 - 107 mmol/L    CO2 29 22 - 29 mmol/L    Anion Gap 10 7 - 16 mmol/L    Glucose 80 74 - 99 mg/dL    BUN 9 6 - 20 mg/dL    CREATININE 0.6 (L) 0.7 - 1.2 mg/dL    GFR Non-African American >60 >=60 mL/min/1.73    GFR African American >60     Calcium 10.4 (H) 8.6 - 10.2 mg/dL    Total Protein 8.6 (H) 6.4 - 8.3 g/dL    Albumin 5.1 3.5 - 5.2 g/dL    Total Bilirubin 1.6 (H) 0.0 - 1.2 mg/dL    Alkaline Phosphatase 131 (H) 40 - 129 U/L     (H) 0 - 40 U/L     (H) 0 - 39 U/L   Platelet Confirmation   Result Value Ref Range    Platelet Confirmation CONFIRMED        Radiology:  No orders to display       ------------------------- NURSING NOTES AND VITALS REVIEWED ---------------------------  Date / Time Roomed:  6/5/2021  7:20 AM  ED Bed Assignment:  22/22    The nursing notes within the ED encounter and vital signs as below have been reviewed. BP (!) 179/100   Pulse 104   Temp 99 °F (37.2 °C)   Resp 18   Ht 5' 5\" (1.651 m)   SpO2 99%   BMI 22.47 kg/m²   Oxygen Saturation Interpretation: Normal      ------------------------------------------ PROGRESS NOTES ------------------------------------------  10:00 AM EDT  I have spoken with the patient and discussed todays results, in addition to providing specific details for the plan of care and counseling regarding the diagnosis and prognosis. Their questions are answered at this time and they are agreeable with the plan. I discussed at length with them reasons for immediate return here for re evaluation. They will followup with their primary care physician by calling their office on Monday.      --------------------------------- ADDITIONAL PROVIDER NOTES ---------------------------------  At this time the patient is without objective evidence of an acute process requiring hospitalization or inpatient management. They have remained hemodynamically stable throughout their entire ED visit and are stable for discharge with outpatient follow-up. The plan has been discussed in detail and they are aware of the specific conditions for emergent return, as well as the importance of follow-up.       New Prescriptions    CHLORDIAZEPOXIDE (LIBRIUM) 25 MG CAPSULE    Take 2 capsules by mouth every 6 hours for 1 day, THEN 1 capsule every 6 hours for 1 day, THEN 1 capsule 2 times daily for 1 day, THEN 1 capsule nightly for 1 day. Diagnosis:  1. Alcohol withdrawal syndrome without complication ([facility]    2. Hypertension, unspecified type        Disposition:  Patient's disposition: Discharge to home  Patient's condition is stable.            Garrison Galloway MD  Resident  06/05/21 0351

## 2021-08-09 ENCOUNTER — HOSPITAL ENCOUNTER (EMERGENCY)
Age: 31
Discharge: HOME | End: 2021-08-10
Attending: EMERGENCY MEDICINE
Payer: MEDICARE

## 2021-08-09 DIAGNOSIS — Z78.9 WITHDRAWN FROM ALCOHOL DETOXIFICATION PROGRAM: Primary | ICD-10-CM

## 2021-08-09 PROCEDURE — 96365 THER/PROPH/DIAG IV INF INIT: CPT

## 2021-08-09 PROCEDURE — 99285 EMERGENCY DEPT VISIT HI MDM: CPT

## 2021-08-09 PROCEDURE — 96375 TX/PRO/DX INJ NEW DRUG ADDON: CPT

## 2021-08-10 VITALS
RESPIRATION RATE: 15 BRPM | TEMPERATURE: 97.6 F | OXYGEN SATURATION: 97 % | SYSTOLIC BLOOD PRESSURE: 143 MMHG | BODY MASS INDEX: 22.49 KG/M2 | HEART RATE: 71 BPM | HEIGHT: 65 IN | WEIGHT: 135 LBS | DIASTOLIC BLOOD PRESSURE: 97 MMHG

## 2021-08-10 LAB
ANION GAP SERPL CALCULATED.3IONS-SCNC: 10 MMOL/L (ref 7–16)
BASOPHILS ABSOLUTE: 0.02 E9/L (ref 0–0.2)
BASOPHILS RELATIVE PERCENT: 0.5 % (ref 0–2)
BUN BLDV-MCNC: 6 MG/DL (ref 6–20)
CALCIUM SERPL-MCNC: 9.7 MG/DL (ref 8.6–10.2)
CHLORIDE BLD-SCNC: 99 MMOL/L (ref 98–107)
CO2: 28 MMOL/L (ref 22–29)
CREAT SERPL-MCNC: 0.7 MG/DL (ref 0.7–1.2)
EOSINOPHILS ABSOLUTE: 0.09 E9/L (ref 0.05–0.5)
EOSINOPHILS RELATIVE PERCENT: 2.1 % (ref 0–6)
ETHANOL: 437 MG/DL (ref 0–0.08)
GFR AFRICAN AMERICAN: >60
GFR NON-AFRICAN AMERICAN: >60 ML/MIN/1.73
GLUCOSE BLD-MCNC: 98 MG/DL (ref 74–99)
HCT VFR BLD CALC: 38.6 % (ref 37–54)
HEMOGLOBIN: 13.3 G/DL (ref 12.5–16.5)
IMMATURE GRANULOCYTES #: 0.01 E9/L
IMMATURE GRANULOCYTES %: 0.2 % (ref 0–5)
LYMPHOCYTES ABSOLUTE: 1.16 E9/L (ref 1.5–4)
LYMPHOCYTES RELATIVE PERCENT: 26.4 % (ref 20–42)
MCH RBC QN AUTO: 33.3 PG (ref 26–35)
MCHC RBC AUTO-ENTMCNC: 34.5 % (ref 32–34.5)
MCV RBC AUTO: 96.5 FL (ref 80–99.9)
MONOCYTES ABSOLUTE: 0.7 E9/L (ref 0.1–0.95)
MONOCYTES RELATIVE PERCENT: 15.9 % (ref 2–12)
NEUTROPHILS ABSOLUTE: 2.41 E9/L (ref 1.8–7.3)
NEUTROPHILS RELATIVE PERCENT: 54.9 % (ref 43–80)
PDW BLD-RTO: 12.6 FL (ref 11.5–15)
PLATELET # BLD: 131 E9/L (ref 130–450)
PMV BLD AUTO: 9.8 FL (ref 7–12)
POTASSIUM REFLEX MAGNESIUM: 4.2 MMOL/L (ref 3.5–5)
RBC # BLD: 4 E12/L (ref 3.8–5.8)
SODIUM BLD-SCNC: 137 MMOL/L (ref 132–146)
WBC # BLD: 4.4 E9/L (ref 4.5–11.5)

## 2021-08-10 PROCEDURE — 85025 COMPLETE CBC W/AUTO DIFF WBC: CPT

## 2021-08-10 PROCEDURE — 80048 BASIC METABOLIC PNL TOTAL CA: CPT

## 2021-08-10 PROCEDURE — 6360000002 HC RX W HCPCS: Performed by: EMERGENCY MEDICINE

## 2021-08-10 PROCEDURE — 96375 TX/PRO/DX INJ NEW DRUG ADDON: CPT

## 2021-08-10 PROCEDURE — 2500000003 HC RX 250 WO HCPCS: Performed by: EMERGENCY MEDICINE

## 2021-08-10 PROCEDURE — 2580000003 HC RX 258: Performed by: EMERGENCY MEDICINE

## 2021-08-10 PROCEDURE — 82077 ASSAY SPEC XCP UR&BREATH IA: CPT

## 2021-08-10 PROCEDURE — 96365 THER/PROPH/DIAG IV INF INIT: CPT

## 2021-08-10 RX ORDER — LORAZEPAM 2 MG/ML
1 INJECTION INTRAMUSCULAR ONCE
Status: DISCONTINUED | OUTPATIENT
Start: 2021-08-10 | End: 2021-08-10 | Stop reason: HOSPADM

## 2021-08-10 RX ORDER — KETOROLAC TROMETHAMINE 30 MG/ML
15 INJECTION, SOLUTION INTRAMUSCULAR; INTRAVENOUS ONCE
Status: COMPLETED | OUTPATIENT
Start: 2021-08-10 | End: 2021-08-10

## 2021-08-10 RX ORDER — 0.9 % SODIUM CHLORIDE 0.9 %
2000 INTRAVENOUS SOLUTION INTRAVENOUS ONCE
Status: COMPLETED | OUTPATIENT
Start: 2021-08-10 | End: 2021-08-10

## 2021-08-10 RX ORDER — PHENOBARBITAL 100 MG/1
100 TABLET ORAL 3 TIMES DAILY
Qty: 30 TABLET | Refills: 0 | Status: SHIPPED | OUTPATIENT
Start: 2021-08-10 | End: 2021-08-20

## 2021-08-10 RX ORDER — ONDANSETRON 2 MG/ML
4 INJECTION INTRAMUSCULAR; INTRAVENOUS ONCE
Status: COMPLETED | OUTPATIENT
Start: 2021-08-10 | End: 2021-08-10

## 2021-08-10 RX ADMIN — PHENOBARBITAL SODIUM 500 MG: 65 INJECTION INTRAMUSCULAR at 02:45

## 2021-08-10 RX ADMIN — FAMOTIDINE 20 MG: 10 INJECTION, SOLUTION INTRAVENOUS at 00:54

## 2021-08-10 RX ADMIN — SODIUM CHLORIDE 2000 ML: 9 INJECTION, SOLUTION INTRAVENOUS at 00:46

## 2021-08-10 RX ADMIN — KETOROLAC TROMETHAMINE 15 MG: 30 INJECTION, SOLUTION INTRAMUSCULAR; INTRAVENOUS at 00:55

## 2021-08-10 RX ADMIN — ONDANSETRON 4 MG: 2 INJECTION INTRAMUSCULAR; INTRAVENOUS at 00:55

## 2021-08-10 ASSESSMENT — PAIN SCALES - GENERAL: PAINLEVEL_OUTOF10: 1

## 2021-08-10 NOTE — ED PROVIDER NOTES
HPI:  8/10/21,   Time: 12:10 AM EDT         El Ferrer is a 28 y.o. male presenting to the ED for syncope and collapse and patient states he drinks regularly, beginning just prior to arrival ago. The complaint has been constant, moderate in severity, and worsened by changing position. Spontaneous resolution with recumbent position. States he drinks about 6 shots a day give or take and is requesting phenobarbital for home for alcohol withdrawal as he wants to stop drinking does not want to be admitted. No fever chills or night sweats and no chest pain or shortness of breath or cough or palpitations. No abdominal pain or vomiting or diarrhea    ROS:   Pertinent positives and negatives are stated within HPI, all other systems reviewed and are negative.  --------------------------------------------- PAST HISTORY ---------------------------------------------  Past Medical History:  has a past medical history of Arrhythmia and Erectile dysfunction. Past Surgical History:  has no past surgical history on file. Social History:  reports that he has been smoking cigarettes. He has been smoking about 0.75 packs per day. He has never used smokeless tobacco. He reports current alcohol use of about 15.0 standard drinks of alcohol per week. He reports current drug use. Frequency: 2.00 times per week. Drug: Cocaine. Family History: family history is not on file. The patients home medications have been reviewed.     Allergies: Cefazolin    -------------------------------------------------- RESULTS -------------------------------------------------  All laboratory and radiology results have been personally reviewed by myself   LABS:  Results for orders placed or performed during the hospital encounter of 08/09/21   CBC Auto Differential   Result Value Ref Range    WBC 4.4 (L) 4.5 - 11.5 E9/L    RBC 4.00 3.80 - 5.80 E12/L    Hemoglobin 13.3 12.5 - 16.5 g/dL    Hematocrit 38.6 37.0 - 54.0 %    MCV 96.5 80.0 - 99.9 fL    MCH 33.3 26.0 - 35.0 pg    MCHC 34.5 32.0 - 34.5 %    RDW 12.6 11.5 - 15.0 fL    Platelets 667 203 - 588 E9/L    MPV 9.8 7.0 - 12.0 fL    Neutrophils % 54.9 43.0 - 80.0 %    Immature Granulocytes % 0.2 0.0 - 5.0 %    Lymphocytes % 26.4 20.0 - 42.0 %    Monocytes % 15.9 (H) 2.0 - 12.0 %    Eosinophils % 2.1 0.0 - 6.0 %    Basophils % 0.5 0.0 - 2.0 %    Neutrophils Absolute 2.41 1.80 - 7.30 E9/L    Immature Granulocytes # 0.01 E9/L    Lymphocytes Absolute 1.16 (L) 1.50 - 4.00 E9/L    Monocytes Absolute 0.70 0.10 - 0.95 E9/L    Eosinophils Absolute 0.09 0.05 - 0.50 E9/L    Basophils Absolute 0.02 0.00 - 0.20 K4/N   Basic Metabolic Panel w/ Reflex to MG   Result Value Ref Range    Sodium 137 132 - 146 mmol/L    Potassium reflex Magnesium 4.2 3.5 - 5.0 mmol/L    Chloride 99 98 - 107 mmol/L    CO2 28 22 - 29 mmol/L    Anion Gap 10 7 - 16 mmol/L    Glucose 98 74 - 99 mg/dL    BUN 6 6 - 20 mg/dL    CREATININE 0.7 0.7 - 1.2 mg/dL    GFR Non-African American >60 >=60 mL/min/1.73    GFR African American >60     Calcium 9.7 8.6 - 10.2 mg/dL       RADIOLOGY:  Interpreted by Radiologist.  No orders to display       ------------------------- NURSING NOTES AND VITALS REVIEWED ---------------------------   The nursing notes within the ED encounter and vital signs as below have been reviewed. BP (!) 149/97   Pulse 99   Temp 97.6 °F (36.4 °C) (Oral)   Resp 16   Ht 5' 5\" (1.651 m)   Wt 135 lb (61.2 kg)   SpO2 97%   BMI 22.47 kg/m²   Oxygen Saturation Interpretation: Normal      ---------------------------------------------------PHYSICAL EXAM--------------------------------------      Constitutional/General: Alert and oriented x3, well appearing, non toxic in NAD, intoxicated  Head: NC/AT  Eyes: PERRL, EOMI  Mouth: Oropharynx clear, handling secretions, no trismus  Neck: Supple, full ROM, no meningeal signs  Pulmonary: Lungs clear to auscultation bilaterally, no wheezes, rales, or rhonchi.  Not in respiratory distress  Cardiovascular:  Regular rate and rhythm, no murmurs, gallops, or rubs. 2+ distal pulses  Abdomen: Soft, non tender, non distended,   Extremities: Moves all extremities x 4. Warm and well perfused  Skin: warm and dry without rash  Neurologic: GCS 15, no focal deficits  Psych: Normal Affect      ------------------------------ ED COURSE/MEDICAL DECISION MAKING----------------------  Medications   LORazepam (ATIVAN) injection 1 mg (0 mg Intravenous Held 8/10/21 0058)   PHENobarbital (LUMINAL) 500 mg in sodium chloride 0.9 % 100 mL IVPB (has no administration in time range)   0.9 % sodium chloride bolus (2,000 mLs Intravenous New Bag 8/10/21 0046)   ondansetron (ZOFRAN) injection 4 mg (4 mg Intravenous Given 8/10/21 0055)   famotidine (PEPCID) injection 20 mg (20 mg Intravenous Given 8/10/21 0054)   ketorolac (TORADOL) injection 15 mg (15 mg Intravenous Given 8/10/21 0055)         Medical Decision Making:    Alcohol intoxication/we will treat for pending withdrawal as patient wants to quit drinking    Counseling: The emergency provider has spoken with the patient and discussed todays results, in addition to providing specific details for the plan of care and counseling regarding the diagnosis and prognosis. Questions are answered at this time and they are agreeable with the plan.      --------------------------------- IMPRESSION AND DISPOSITION ---------------------------------    IMPRESSION  1.  Withdrawn from alcohol detoxification program New Problem       DISPOSITION  Disposition: Discharge to home  Patient condition is stable                  Marco A Vicente MD  08/10/21 1110

## 2021-08-11 ENCOUNTER — HOSPITAL ENCOUNTER (EMERGENCY)
Age: 31
Discharge: HOME | End: 2021-08-11
Attending: EMERGENCY MEDICINE
Payer: MEDICARE

## 2021-08-11 VITALS
TEMPERATURE: 97 F | RESPIRATION RATE: 20 BRPM | BODY MASS INDEX: 23.32 KG/M2 | SYSTOLIC BLOOD PRESSURE: 140 MMHG | HEIGHT: 65 IN | WEIGHT: 140 LBS | HEART RATE: 76 BPM | DIASTOLIC BLOOD PRESSURE: 95 MMHG | OXYGEN SATURATION: 97 %

## 2021-08-11 DIAGNOSIS — Z78.9 WITHDRAWN FROM ALCOHOL DETOXIFICATION PROGRAM: Primary | ICD-10-CM

## 2021-08-11 PROCEDURE — 96372 THER/PROPH/DIAG INJ SC/IM: CPT

## 2021-08-11 PROCEDURE — 6360000002 HC RX W HCPCS: Performed by: EMERGENCY MEDICINE

## 2021-08-11 PROCEDURE — 99284 EMERGENCY DEPT VISIT MOD MDM: CPT

## 2021-08-11 PROCEDURE — 6370000000 HC RX 637 (ALT 250 FOR IP): Performed by: EMERGENCY MEDICINE

## 2021-08-11 RX ORDER — CLONIDINE HYDROCHLORIDE 0.1 MG/1
0.1 TABLET ORAL 2 TIMES DAILY
Qty: 60 TABLET | Refills: 3 | Status: SHIPPED | OUTPATIENT
Start: 2021-08-11 | End: 2021-12-20

## 2021-08-11 RX ORDER — HYDROXYZINE PAMOATE 50 MG/1
50 CAPSULE ORAL 3 TIMES DAILY PRN
Qty: 30 CAPSULE | Refills: 0 | Status: SHIPPED | OUTPATIENT
Start: 2021-08-11 | End: 2021-08-21

## 2021-08-11 RX ORDER — PHENOBARBITAL 32.4 MG/1
97.2 TABLET ORAL ONCE
Status: COMPLETED | OUTPATIENT
Start: 2021-08-11 | End: 2021-08-11

## 2021-08-11 RX ORDER — HYDROXYZINE HYDROCHLORIDE 50 MG/ML
50 INJECTION, SOLUTION INTRAMUSCULAR ONCE
Status: COMPLETED | OUTPATIENT
Start: 2021-08-11 | End: 2021-08-11

## 2021-08-11 RX ADMIN — PHENOBARBITAL 97.2 MG: 32.4 TABLET ORAL at 02:06

## 2021-08-11 RX ADMIN — HYDROXYZINE HYDROCHLORIDE 50 MG: 50 INJECTION, SOLUTION INTRAMUSCULAR at 02:05

## 2021-08-11 NOTE — ED PROVIDER NOTES
HPI:  8/11/21,   Time: 1:17 AM EDT         Lu Faith is a 28 y.o. male presenting to the ED for patient was started on phenobarbital IV last night to prevent alcohol withdrawal seizures and was sent home with phenobarbital by mouth but did not take because he had to work as a  and wanted to be on his game but at the close of work felt like he was going to have a seizure so he drank some vodka because he did not have his phenobarbital on him and thought he should come in and get IV phenobarbital, beginning just prior to arrival ago. The complaint has been constant, moderate in severity, and worsened by nothing. Feeling better after drinking vodka is no longer think he is going to have a seizure    ROS:   Pertinent positives and negatives are stated within HPI, all other systems reviewed and are negative.  --------------------------------------------- PAST HISTORY ---------------------------------------------  Past Medical History:  has a past medical history of Arrhythmia and Erectile dysfunction. Past Surgical History:  has no past surgical history on file. Social History:  reports that he has been smoking cigarettes. He has been smoking about 0.75 packs per day. He has never used smokeless tobacco. He reports current alcohol use of about 15.0 standard drinks of alcohol per week. He reports current drug use. Frequency: 2.00 times per week. Drug: Cocaine. Family History: family history is not on file. The patients home medications have been reviewed. Allergies: Cefazolin    -------------------------------------------------- RESULTS -------------------------------------------------  All laboratory and radiology results have been personally reviewed by myself   LABS:  No results found for this visit on 08/11/21.     RADIOLOGY:  Interpreted by Radiologist.  No orders to display       ------------------------- NURSING NOTES AND VITALS REVIEWED ---------------------------   The nursing notes within the ED encounter and vital signs as below have been reviewed. BP (!) 172/106   Pulse 79   Temp 97 °F (36.1 °C) (Oral)   Resp 20   Ht 5' 5\" (1.651 m)   Wt 140 lb (63.5 kg)   SpO2 97%   BMI 23.30 kg/m²   Oxygen Saturation Interpretation: Normal      ---------------------------------------------------PHYSICAL EXAM--------------------------------------      Constitutional/General: Alert and oriented x3, well appearing, non toxic in NAD  Head: NC/AT  Eyes: PERRL, EOMI  Mouth: Oropharynx clear, handling secretions, no trismus  Neck: Supple, full ROM, no meningeal signs  Pulmonary: Lungs clear to auscultation bilaterally, no wheezes, rales, or rhonchi. Not in respiratory distress  Cardiovascular:  Regular rate and rhythm, no murmurs, gallops, or rubs. 2+ distal pulses  Abdomen: Soft, non tender, non distended,   Extremities: Moves all extremities x 4. Warm and well perfused  Skin: warm and dry without rash  Neurologic: GCS 15,  Psych: Slightly anxious    ------------------------------ ED COURSE/MEDICAL DECISION MAKING----------------------  Medications   hydrOXYzine (VISTARIL) injection 50 mg (has no administration in time range)   PHENobarbital (LUMINAL) tablet 97.2 mg (has no administration in time range)         Medical Decision Making:    Alcohol withdrawal symptoms improved with vodka patient told to take his phenobarbital even if he has to work    Counseling: The emergency provider has spoken with the patient and discussed todays results, in addition to providing specific details for the plan of care and counseling regarding the diagnosis and prognosis. Questions are answered at this time and they are agreeable with the plan.      --------------------------------- IMPRESSION AND DISPOSITION ---------------------------------    IMPRESSION  1.  Withdrawn from alcohol detoxification program Stable       DISPOSITION  Disposition: Discharge to home  Patient condition is stable Nicholas Celis MD  08/11/21 1556

## 2021-12-01 ENCOUNTER — HOSPITAL ENCOUNTER (EMERGENCY)
Age: 31
Discharge: HOME | End: 2021-12-01
Attending: EMERGENCY MEDICINE
Payer: MEDICARE

## 2021-12-01 ENCOUNTER — APPOINTMENT (OUTPATIENT)
Dept: GENERAL RADIOLOGY | Age: 31
End: 2021-12-01
Payer: MEDICARE

## 2021-12-01 VITALS
HEIGHT: 65 IN | OXYGEN SATURATION: 100 % | BODY MASS INDEX: 23.82 KG/M2 | TEMPERATURE: 97.6 F | RESPIRATION RATE: 16 BRPM | SYSTOLIC BLOOD PRESSURE: 114 MMHG | HEART RATE: 82 BPM | WEIGHT: 143 LBS | DIASTOLIC BLOOD PRESSURE: 66 MMHG

## 2021-12-01 DIAGNOSIS — R53.83 FATIGUE, UNSPECIFIED TYPE: Primary | ICD-10-CM

## 2021-12-01 DIAGNOSIS — R07.9 CHEST PAIN, UNSPECIFIED TYPE: ICD-10-CM

## 2021-12-01 LAB
ALBUMIN SERPL-MCNC: 4.7 G/DL (ref 3.5–5.2)
ALP BLD-CCNC: 91 U/L (ref 40–129)
ALT SERPL-CCNC: 44 U/L (ref 0–40)
ANION GAP SERPL CALCULATED.3IONS-SCNC: 11 MMOL/L (ref 7–16)
AST SERPL-CCNC: 29 U/L (ref 0–39)
BASOPHILS ABSOLUTE: 0.02 E9/L (ref 0–0.2)
BASOPHILS RELATIVE PERCENT: 0.3 % (ref 0–2)
BILIRUB SERPL-MCNC: 0.6 MG/DL (ref 0–1.2)
BUN BLDV-MCNC: 8 MG/DL (ref 6–20)
CALCIUM SERPL-MCNC: 9.8 MG/DL (ref 8.6–10.2)
CHLORIDE BLD-SCNC: 102 MMOL/L (ref 98–107)
CO2: 25 MMOL/L (ref 22–29)
CREAT SERPL-MCNC: 0.7 MG/DL (ref 0.7–1.2)
EKG ATRIAL RATE: 61 BPM
EKG P AXIS: 51 DEGREES
EKG P-R INTERVAL: 168 MS
EKG Q-T INTERVAL: 410 MS
EKG QRS DURATION: 98 MS
EKG QTC CALCULATION (BAZETT): 412 MS
EKG R AXIS: 49 DEGREES
EKG T AXIS: 35 DEGREES
EKG VENTRICULAR RATE: 61 BPM
EOSINOPHILS ABSOLUTE: 0.14 E9/L (ref 0.05–0.5)
EOSINOPHILS RELATIVE PERCENT: 2.4 % (ref 0–6)
GFR AFRICAN AMERICAN: >60
GFR NON-AFRICAN AMERICAN: >60 ML/MIN/1.73
GLUCOSE BLD-MCNC: 100 MG/DL (ref 74–99)
HCT VFR BLD CALC: 39.8 % (ref 37–54)
HEMOGLOBIN: 13.6 G/DL (ref 12.5–16.5)
IMMATURE GRANULOCYTES #: 0.02 E9/L
IMMATURE GRANULOCYTES %: 0.3 % (ref 0–5)
LYMPHOCYTES ABSOLUTE: 2.44 E9/L (ref 1.5–4)
LYMPHOCYTES RELATIVE PERCENT: 41.8 % (ref 20–42)
MCH RBC QN AUTO: 31.4 PG (ref 26–35)
MCHC RBC AUTO-ENTMCNC: 34.2 % (ref 32–34.5)
MCV RBC AUTO: 91.9 FL (ref 80–99.9)
MONOCYTES ABSOLUTE: 0.6 E9/L (ref 0.1–0.95)
MONOCYTES RELATIVE PERCENT: 10.3 % (ref 2–12)
NEUTROPHILS ABSOLUTE: 2.62 E9/L (ref 1.8–7.3)
NEUTROPHILS RELATIVE PERCENT: 44.9 % (ref 43–80)
PDW BLD-RTO: 12.6 FL (ref 11.5–15)
PLATELET # BLD: 183 E9/L (ref 130–450)
PMV BLD AUTO: 10.1 FL (ref 7–12)
POTASSIUM REFLEX MAGNESIUM: 4.2 MMOL/L (ref 3.5–5)
RBC # BLD: 4.33 E12/L (ref 3.8–5.8)
SODIUM BLD-SCNC: 138 MMOL/L (ref 132–146)
TOTAL PROTEIN: 7.7 G/DL (ref 6.4–8.3)
TROPONIN, HIGH SENSITIVITY: <6 NG/L (ref 0–11)
WBC # BLD: 5.8 E9/L (ref 4.5–11.5)

## 2021-12-01 PROCEDURE — 85025 COMPLETE CBC W/AUTO DIFF WBC: CPT

## 2021-12-01 PROCEDURE — 71046 X-RAY EXAM CHEST 2 VIEWS: CPT

## 2021-12-01 PROCEDURE — 93005 ELECTROCARDIOGRAM TRACING: CPT | Performed by: PHYSICIAN ASSISTANT

## 2021-12-01 PROCEDURE — 84484 ASSAY OF TROPONIN QUANT: CPT

## 2021-12-01 PROCEDURE — 36415 COLL VENOUS BLD VENIPUNCTURE: CPT

## 2021-12-01 PROCEDURE — 80053 COMPREHEN METABOLIC PANEL: CPT

## 2021-12-01 PROCEDURE — 99284 EMERGENCY DEPT VISIT MOD MDM: CPT

## 2021-12-01 PROCEDURE — 93010 ELECTROCARDIOGRAM REPORT: CPT | Performed by: INTERNAL MEDICINE

## 2021-12-01 ASSESSMENT — ENCOUNTER SYMPTOMS
VOMITING: 0
EYE DISCHARGE: 0
COUGH: 0
EYE REDNESS: 0
EYE PAIN: 0
SHORTNESS OF BREATH: 0
ABDOMINAL PAIN: 0
NAUSEA: 0
WHEEZING: 0
BACK PAIN: 0
DIARRHEA: 0

## 2021-12-01 NOTE — ED PROVIDER NOTES
This patient reports for over 1 year, he awakens fatigued, he has daily bouts of chest pain. He denies shortness of breath. He states he has been to his primary care physician for this problem multiple times in feels that he is not been diagnosed properly. He has been referred to a neurologist and also to a cardiologist. Currently, he denies chest pain. He is a reformed alcoholic. States his last drink was 3 years ago. He is unable to specify where he feels the chest pain when it occurs. He is unable to describe the character quality. He relates that when he does have the pain going last a few minutes. Resolved spontaneously. He states he feels as though his [email or web address] is working hard. \" He denies sensation of tachycardia or skipped beats. The history is provided by the patient. Chest Pain  Pain location:  Unable to specify  Pain radiates to:  Does not radiate  Pain severity:  Mild  Onset quality:  Sudden  Timing:  Intermittent  Progression:  Unchanged  Chronicity:  Chronic  Relieved by:  None tried  Worsened by:  Nothing  Ineffective treatments:  None tried  Associated symptoms: fatigue    Associated symptoms: no abdominal pain, no anorexia, no anxiety, no back pain, no cough, no fever, no headache, no nausea, no shortness of breath, no vomiting and no weakness         Review of Systems   Constitutional: Positive for fatigue. Negative for chills and fever. Eyes: Negative for pain, discharge and redness. Respiratory: Negative for cough, shortness of breath and wheezing. Cardiovascular: Positive for chest pain. Gastrointestinal: Negative for abdominal pain, anorexia, diarrhea, nausea and vomiting. Genitourinary: Negative for dysuria and frequency. Musculoskeletal: Negative for arthralgias and back pain. Skin: Negative for rash and wound. Neurological: Negative for weakness and headaches. Hematological: Negative for adenopathy. All other systems reviewed and are negative.        Physical Exam  Vitals and nursing note reviewed. Constitutional:       Appearance: He is well-developed. HENT:      Head: Normocephalic and atraumatic. Eyes:      Pupils: Pupils are equal, round, and reactive to light. Cardiovascular:      Rate and Rhythm: Normal rate and regular rhythm. Heart sounds: Normal heart sounds. No murmur heard. Pulmonary:      Effort: Pulmonary effort is normal. No respiratory distress. Breath sounds: Normal breath sounds. No wheezing or rales. Abdominal:      General: Bowel sounds are normal.      Palpations: Abdomen is soft. Tenderness: There is no abdominal tenderness. There is no guarding or rebound. Musculoskeletal:      Cervical back: Normal range of motion and neck supple. Skin:     General: Skin is warm and dry. Neurological:      Mental Status: He is alert and oriented to person, place, and time. Cranial Nerves: No cranial nerve deficit. Coordination: Coordination normal.   Psychiatric:         Mood and Affect: Mood is anxious. Speech: Speech is rapid and pressured. Procedures     MDM       EKG: This EKG is signed and interpreted by me. Rate: 61  Rhythm: Sinus  Interpretation: no acute changes  Comparison: stable as compared to patient's most recent EKG    --------------------------------------------- PAST HISTORY ---------------------------------------------  Past Medical History:  has a past medical history of Arrhythmia and Erectile dysfunction. Past Surgical History:  has no past surgical history on file. Social History:  reports that he has been smoking cigarettes. He has been smoking about 0.75 packs per day. He has never used smokeless tobacco. He reports current alcohol use of about 15.0 standard drinks of alcohol per week. He reports current drug use. Frequency: 2.00 times per week. Drug: Cocaine. Family History: family history is not on file.      The patients home medications have been reviewed. Allergies: Cefazolin    -------------------------------------------------- RESULTS -------------------------------------------------  Labs:  Results for orders placed or performed during the hospital encounter of 12/01/21   CBC Auto Differential   Result Value Ref Range    WBC 5.8 4.5 - 11.5 E9/L    RBC 4.33 3.80 - 5.80 E12/L    Hemoglobin 13.6 12.5 - 16.5 g/dL    Hematocrit 39.8 37.0 - 54.0 %    MCV 91.9 80.0 - 99.9 fL    MCH 31.4 26.0 - 35.0 pg    MCHC 34.2 32.0 - 34.5 %    RDW 12.6 11.5 - 15.0 fL    Platelets 304 692 - 707 E9/L    MPV 10.1 7.0 - 12.0 fL    Neutrophils % 44.9 43.0 - 80.0 %    Immature Granulocytes % 0.3 0.0 - 5.0 %    Lymphocytes % 41.8 20.0 - 42.0 %    Monocytes % 10.3 2.0 - 12.0 %    Eosinophils % 2.4 0.0 - 6.0 %    Basophils % 0.3 0.0 - 2.0 %    Neutrophils Absolute 2.62 1.80 - 7.30 E9/L    Immature Granulocytes # 0.02 E9/L    Lymphocytes Absolute 2.44 1.50 - 4.00 E9/L    Monocytes Absolute 0.60 0.10 - 0.95 E9/L    Eosinophils Absolute 0.14 0.05 - 0.50 E9/L    Basophils Absolute 0.02 0.00 - 0.20 E9/L   Comprehensive Metabolic Panel w/ Reflex to MG   Result Value Ref Range    Sodium 138 132 - 146 mmol/L    Potassium reflex Magnesium 4.2 3.5 - 5.0 mmol/L    Chloride 102 98 - 107 mmol/L    CO2 25 22 - 29 mmol/L    Anion Gap 11 7 - 16 mmol/L    Glucose 100 (H) 74 - 99 mg/dL    BUN 8 6 - 20 mg/dL    CREATININE 0.7 0.7 - 1.2 mg/dL    GFR Non-African American >60 >=60 mL/min/1.73    GFR African American >60     Calcium 9.8 8.6 - 10.2 mg/dL    Total Protein 7.7 6.4 - 8.3 g/dL    Albumin 4.7 3.5 - 5.2 g/dL    Total Bilirubin 0.6 0.0 - 1.2 mg/dL    Alkaline Phosphatase 91 40 - 129 U/L    ALT 44 (H) 0 - 40 U/L    AST 29 0 - 39 U/L   Troponin   Result Value Ref Range    Troponin, High Sensitivity <6 0 - 11 ng/L       Radiology:  XR CHEST (2 VW)   Final Result   No acute process.              ------------------------- NURSING NOTES AND VITALS REVIEWED ---------------------------  Date / Time Roomed:  12/1/2021  9:18 AM  ED Bed Assignment:  [id number]    The nursing notes within the ED encounter and vital signs as below have been reviewed. /72   Pulse 67   Temp 98.4 °F (36.9 °C) (Oral)   Resp 16   Ht 5' 5\" (1.651 m)   Wt 143 lb (64.9 kg)   SpO2 99%   BMI 23.80 kg/m²   Oxygen Saturation Interpretation: Normal      ------------------------------------------ PROGRESS NOTES ------------------------------------------  11:07 AM EST  I have spoken with the patient and discussed todays results, in addition to providing specific details for the plan of care and counseling regarding the diagnosis and prognosis. Their questions are answered at this time and they are agreeable with the plan. I discussed at length with them reasons for immediate return here for re evaluation. They will followup with their primary care physician by calling their office tomorrow. --------------------------------- ADDITIONAL PROVIDER NOTES ---------------------------------  At this time the patient is without objective evidence of an acute process requiring hospitalization or inpatient management. They have remained hemodynamically stable throughout their entire ED visit and are stable for discharge with outpatient follow-up. The plan has been discussed in detail and they are aware of the specific conditions for emergent return, as well as the importance of follow-up. New Prescriptions    No medications on file       Diagnosis:  1. Fatigue, unspecified type    2. Chest pain, unspecified type        Disposition:  Patient's disposition: Discharge to home  Patient's condition is stable.                        Paulette Chavarria DO  12/01/21 1124

## 2021-12-01 NOTE — ED NOTES
FIRST PROVIDER CONTACT ASSESSMENT NOTE       Department of Emergency Medicine   12/1/21  12:42 AM EST    Chief Complaint: Chest Pain (Can feel/hear it)    History of Present Illness:   Kayli Mahoney is a 28 y.o. male who presents to the ED for chest pain. Patient states he can feel and hear his heart and pain. Focused Physical Exam:  VS:  There were no vitals taken for this visit. General: Alert and in no apparent distress    Medical History:  has a past medical history of Arrhythmia and Erectile dysfunction. Surgical History:  has no past surgical history on file. Social History:  reports that he has been smoking cigarettes. He has been smoking about 0.75 packs per day. He has never used smokeless tobacco. He reports current alcohol use of about 15.0 standard drinks of alcohol per week. He reports current drug use. Frequency: 2.00 times per week. Drug: Cocaine. Family History: family history is not on file.     *ALLERGIES*     Cefazolin     Initial Plan of Care:  Initiate Treatment-Testing, Proceed toTreatment Area When Bed Available for ED Attending/MLP to Continue Care    -----------------END OF FIRST PROVIDER CONTACT ASSESSMENT NOTE--------------  Electronically signed by Miguel Ángel Briggs PA-C   DD: 12/1/21         Miguel Ángel Briggs PA-C  12/01/21 [id number]

## 2021-12-20 ENCOUNTER — OFFICE VISIT (OUTPATIENT)
Dept: NEUROLOGY | Age: 31
End: 2021-12-20
Payer: MEDICARE

## 2021-12-20 VITALS
BODY MASS INDEX: 23.32 KG/M2 | DIASTOLIC BLOOD PRESSURE: 70 MMHG | HEIGHT: 65 IN | SYSTOLIC BLOOD PRESSURE: 110 MMHG | WEIGHT: 140 LBS

## 2021-12-20 DIAGNOSIS — G44.209 MIXED MIGRAINE AND MUSCLE CONTRACTION HEADACHE: Primary | ICD-10-CM

## 2021-12-20 DIAGNOSIS — G43.909 MIXED MIGRAINE AND MUSCLE CONTRACTION HEADACHE: Primary | ICD-10-CM

## 2021-12-20 DIAGNOSIS — R56.9 ALCOHOL RELATED SEIZURE (HCC): Chronic | ICD-10-CM

## 2021-12-20 DIAGNOSIS — Z87.898 HISTORY OF ALCOHOL USE DISORDER: ICD-10-CM

## 2021-12-20 DIAGNOSIS — F41.1 GENERALIZED ANXIETY DISORDER: ICD-10-CM

## 2021-12-20 PROBLEM — R51.9 CEPHALGIA: Status: ACTIVE | Noted: 2021-12-20

## 2021-12-20 PROCEDURE — G8420 CALC BMI NORM PARAMETERS: HCPCS | Performed by: PSYCHIATRY & NEUROLOGY

## 2021-12-20 PROCEDURE — 99204 OFFICE O/P NEW MOD 45 MIN: CPT | Performed by: PSYCHIATRY & NEUROLOGY

## 2021-12-20 PROCEDURE — G8427 DOCREV CUR MEDS BY ELIG CLIN: HCPCS | Performed by: PSYCHIATRY & NEUROLOGY

## 2021-12-20 PROCEDURE — G8484 FLU IMMUNIZE NO ADMIN: HCPCS | Performed by: PSYCHIATRY & NEUROLOGY

## 2021-12-20 PROCEDURE — 4004F PT TOBACCO SCREEN RCVD TLK: CPT | Performed by: PSYCHIATRY & NEUROLOGY

## 2021-12-20 RX ORDER — TOPIRAMATE 25 MG/1
TABLET ORAL
Qty: 90 TABLET | Refills: 2 | Status: SHIPPED
Start: 2021-12-20 | End: 2022-02-11 | Stop reason: CLARIF

## 2021-12-20 RX ORDER — HYDROXYZINE PAMOATE 50 MG/1
50 CAPSULE ORAL 4 TIMES DAILY PRN
COMMUNITY
End: 2022-03-18

## 2021-12-20 ASSESSMENT — ENCOUNTER SYMPTOMS
ALLERGIC/IMMUNOLOGIC NEGATIVE: 1
RESPIRATORY NEGATIVE: 1
GASTROINTESTINAL NEGATIVE: 1
EYES NEGATIVE: 1

## 2021-12-20 NOTE — PROGRESS NOTES
Neurology Consult Note:    Patient: Meryle Gentle  : 1990  Date: 21  Referring provider: Franky Wade DO    Referral to Neurology: Episodic headaches. History of chronic alcohol use disorder    Dear Franky Wade DO:    I have seen Meryle Gentle an alert 81-year-old man referred for evaluation of episodic headaches. Past medical history reviewed indicates a history of chronic alcohol use disorder and elevated liver enzymes, thrombocytopenia. Below is a summary of his headache history obtained today:    Onset: in 's  Location: temples, back of head, no hemicranial preference. Pain type: pressure, pounding  Frequency: constant, daily  Duration: all day  Positive symptoms: light, sound sensitivity; nausea, no LOV. Triggers: none except with light, sound, movement  Caffeine use: rare  Family history: mother with migraine possibly  Past medical/psychiatric history: Chronic alcohol use disorder, hypertension, elevated liver enzymes; c/o daily chest pain reported over 1 year, fatigue, ED progress note, 2021; anxiety, pressured speech noted; hx cocaine use, alcohol abuse. Medications used/tried: Clonidine for alcoholc withdrawal management-reports no longer taking. Lab Data: Reviewed from 2021, 8/10/2021, ALT 44, blood glucose 100, ethanol level 437, sodium 138, normal BUN and creatinine, normal CBC with differential.    Imaging Data: 2021, head CT without contrast, reviewed: No acute intracranial abnormality. X-ray of cervical and lumbar spine, 3/10/2021, no acute process, mild kyphosis centered at C5, spina bifida occulta of S1.    Current Outpatient Medications   Medication Sig Dispense Refill    cloNIDine (CATAPRES) 0.1 MG tablet Take 1 tablet by mouth 2 times daily 60 tablet 3     No current facility-administered medications for this visit.        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 No family history on file. Mother with hx possible migraines    Social History     Socioeconomic History    Marital status: Single     Spouse name: Not on file    Number of children: Not on file    Years of education: Not on file    Highest education level: Not on file   Occupational History    Not on file   Tobacco Use    Smoking status: Current Every Day Smoker     Packs/day: 0.25     Types: Cigarettes    Smokeless tobacco: Never Used   Vaping Use    Vaping Use: Some days    Substances: Nicotine, Flavoring   Substance and Sexual Activity    Alcohol use: Yes     Alcohol/week: 15.0 standard drinks     Types: 15 Cans of beer per week     Comment: 15 Beers Daily, pt states he is down to 5 beers wkly    Drug use: Not Currently     Frequency: 2.0 times per week     Types: Cocaine     Comment: states he quit cocaine last 4/2021    Sexual activity: Yes     Partners: Female   Other Topics Concern    Not on file   Social History Narrative    Not on file     Social Determinants of Health     Financial Resource Strain:     Difficulty of Paying Living Expenses: Not on file   Food Insecurity:     Worried About Running Out of Food in the Last Year: Not on file    Shane of Food in the Last Year: Not on file   Transportation Needs:     Lack of Transportation (Medical): Not on file    Lack of Transportation (Non-Medical):  Not on file   Physical Activity:     Days of Exercise per Week: Not on file    Minutes of Exercise per Session: Not on file   Stress:     Feeling of Stress : Not on file   Social Connections:     Frequency of Communication with Friends and Family: Not on file    Frequency of Social Gatherings with Friends and Family: Not on file    Attends Mandaeism Services: Not on file    Active Member of Clubs or Organizations: Not on file    Attends Club or Organization Meetings: Not on file    Marital Status: Not on file   Intimate Partner Violence:     Fear of Current or Ex-Partner: Not on file    Emotionally Abused: Not on file    Physically Abused: Not on file    Sexually Abused: Not on file   Housing Stability:     Unable to Pay for Housing in the Last Year: Not on file    Number of [place] in the Last Year: Not on file    Unstable Housing in the Last Year: Not on file     Review of Systems   Constitutional: Positive for fatigue. HENT: Negative. Eyes: Negative. Respiratory: Negative. Cardiovascular: Positive for chest pain. Gastrointestinal: Negative. Endocrine: Negative. Genitourinary: Negative. Musculoskeletal: Positive for arthralgias. Skin: Negative. Allergic/Immunologic: Negative. Neurological: Positive for headaches. Hematological: Negative. Psychiatric/Behavioral: The patient is nervous/anxious. Generalized anxiety     Neurologic Exam:  /70 (Site: Left Upper Arm, Position: Sitting, Cuff Size: Medium Adult)   Ht 5' 5\" (1.651 m)   Wt 140 lb (63.5 kg)   BMI 23.30 kg/m²   General appearance: Alert, highly anxious, well-groomed, seated in the exam room, no acute distress  HEENT: Normocephalic/atraumatic.   Neck: Supple  Cardiac: RRR  Respiratory: grossly clear  Extremities: No edema, erythema or cyanosis  Skin: No lesions or rashes  Musculoskeletal: No fasciculations or tremors  Mental Status: Alert, Oriented x3  Speech/Language: Clear, pressured speech, tangential  Attention span/Concentration: Decreased with easy distractibility, tangential  Affect/Mood: High anxiety level, push of speech  Insight/Judgement: [employer] of Knowledge/Current events: Unable to accurately assess, tangential  CN II-XII:     Pupils: Equal, reactive to light, 2.5 mm     EOM's: Full without nystagmus  Visual Fields: Grossly full to confrontation  Fundi: Miosis to light, grossly unremarkable  CN V: normal V1-V3  CN VII: No facial droop  CN VIII: Hearing grossly intact  CN IX-XII: Tongue midline  SCM/Trapezii: 5/5 power  Motor: 5/5 power in the upper and lower extremities without tremor or drift and normal motor tone. Intact fine motor function of both hands, symmetric. DTR's: 1-2+ and symmetric in the upper and lower extremities, no ankle clonus, plantar responses are flexor. Sensory: Grossly intact subjectively to light touch and sharp stick testing. Coordination/Gait: Grossly intact on finger-to-nose testing, no truncal or cerebellar gait ataxia. Assessment/Plan:  1. Chronic daily headache pattern, chronic intermittent tension type headaches primarily. Possible migraine variant. 2.  Generalized anxiety disorder. 3.  History of polysubstance use including chronic alcoholism, cocaine use. 4.  History of elevated transaminase levels, alcohol-related withdrawal seizure. 5.  For headache prophylaxis, a trial of topiramate was discussed, starting 25 mg at bedtime for 2 weeks, then 50 mg at bedtime for 2 weeks, then may advance to 75 mg at bedtime if clinically indicated. Unfortunately, other conventional prophylactic headache medications cannot be safely advised him including vascular medications due to his history of cardiac arrhythmia and complaint of chest pains greater than 1 year; antidepressant pain medications which may worsen bipolar affective symptoms or cause tachycardia, contraindicated in patients with history of cardiac arrhythmia; and divalproex ER, contraindicated due to his history of chronic alcohol use disorder and elevated transaminase levels. 6.  He should be under the care of a behavioral health, addiction medicine specialist, strongly advisable.   7.  Results of his MR brain scan from [address] imaging are currently pending, but since you ordered the study for him, the report would be sent to your attention as ordering provider which was explained to the patient. 8.  Patient information was provided from the National headache foundation website. 9.  Follow-up in the Neurology clinic in 8 weeks if clinically indicated. Sincerely,      Hernán Strong MD    This note was created using speech recognition transcription software. Despite proofreading, there may be several typographical errors present that may affect the meaning of the content. Please call with any questions. Note: A total time of 35 mins. was spent on the date of service in preparation for this visit, which included face-to-face patient care, completing clinical documentation, counseling and coordination of care based on clinical impression, neurologic diagnosis, review of pertinent imaging studies, test results, implementation and discussion of treatment plan, risk factor reduction and patient and/or family education.

## 2022-02-09 ENCOUNTER — TELEPHONE (OUTPATIENT)
Dept: CARDIOLOGY CLINIC | Age: 32
End: 2022-02-09

## 2022-02-09 NOTE — TELEPHONE ENCOUNTER
Patient Appointment Form:      PCP: THOMPSON Zambrano, DO  Referring: THOMPSON Zambrnao, DO    Has the Patient:    Seen a Cardiologist? no    Had a heart catheterization? no    Had heart surgery? no    Had a stress test or nuclear stress test? no    Had an echocardiogram? no    Had a vascular ultrasound? no    Had a 24/48 heart monitor or extended cardiac event monitor? no    Had recent blood work in the last 6 months? yes    date: 12/01/2021    ordering physician: RADU Perez    Had a pacemaker/ICD/ILR implant? no    Seen an Electrophysiologist? no        Will send records via: in [address] [place] [address] Rd      Date & time of appointment:  2/11/2022 [address] [address]

## 2022-02-11 ENCOUNTER — OFFICE VISIT (OUTPATIENT)
Dept: CARDIOLOGY CLINIC | Age: 32
End: 2022-02-11
Payer: MEDICARE

## 2022-02-11 VITALS
SYSTOLIC BLOOD PRESSURE: 132 MMHG | BODY MASS INDEX: 25.56 KG/M2 | HEART RATE: 66 BPM | HEIGHT: 65 IN | WEIGHT: 153.4 LBS | DIASTOLIC BLOOD PRESSURE: 79 MMHG | RESPIRATION RATE: 18 BRPM

## 2022-02-11 DIAGNOSIS — R07.9 CHEST PAIN, UNSPECIFIED TYPE: Primary | ICD-10-CM

## 2022-02-11 PROBLEM — R51.9 CEPHALGIA: Status: RESOLVED | Noted: 2021-12-20 | Resolved: 2022-02-11

## 2022-02-11 PROCEDURE — G8419 CALC BMI OUT NRM PARAM NOF/U: HCPCS | Performed by: INTERNAL MEDICINE

## 2022-02-11 PROCEDURE — 99204 OFFICE O/P NEW MOD 45 MIN: CPT | Performed by: INTERNAL MEDICINE

## 2022-02-11 PROCEDURE — G8427 DOCREV CUR MEDS BY ELIG CLIN: HCPCS | Performed by: INTERNAL MEDICINE

## 2022-02-11 PROCEDURE — G8484 FLU IMMUNIZE NO ADMIN: HCPCS | Performed by: INTERNAL MEDICINE

## 2022-02-11 PROCEDURE — 4004F PT TOBACCO SCREEN RCVD TLK: CPT | Performed by: INTERNAL MEDICINE

## 2022-02-11 PROCEDURE — 93000 ELECTROCARDIOGRAM COMPLETE: CPT | Performed by: INTERNAL MEDICINE

## 2022-02-11 RX ORDER — BACLOFEN 10 MG/1
TABLET ORAL
COMMUNITY
Start: 2022-01-18 | End: 2022-03-18

## 2022-02-11 RX ORDER — CLONIDINE HYDROCHLORIDE 0.1 MG/1
TABLET ORAL
COMMUNITY
Start: 2022-01-18 | End: 2022-03-18

## 2022-02-11 RX ORDER — CHOLECALCIFEROL (VITAMIN D3) 125 MCG
10 CAPSULE ORAL NIGHTLY PRN
COMMUNITY
Start: 2022-01-18 | End: 2022-04-28 | Stop reason: ALTCHOICE

## 2022-02-11 RX ORDER — ONDANSETRON 4 MG/1
TABLET, FILM COATED ORAL PRN
COMMUNITY
Start: 2022-01-18 | End: 2022-04-13 | Stop reason: CLARIF

## 2022-02-11 NOTE — PROGRESS NOTES
Chief Complaint   Patient presents with    Chest Pain       Patient Active Problem List    Diagnosis Date Noted    History of alcohol use disorder 12/20/2021    Alcohol related seizure ([address] 12/20/2021     Overview Note:     Hx binge drinking      Anxiety state 09/18/2014    Elevated liver enzymes 09/18/2014    Chest pain 09/18/2014       Current Outpatient Medications   Medication Sig Dispense Refill    baclofen (LIORESAL) 10 MG tablet       cloNIDine (CATAPRES) 0.1 MG tablet       ondansetron (ZOFRAN) 4 MG tablet       melatonin 5 MG TABS tablet       hydrOXYzine (VISTARIL) 50 MG capsule Take 50 mg by mouth 4 times daily as needed for Itching      Tadalafil (CIALIS PO) Take by mouth       No current facility-administered medications for this visit. Allergies   Allergen Reactions    Cefazolin     Cefprozil Rash       Vitals:    02/11/22 0939   BP: 132/79   Pulse: 66   Resp: 18   Weight: 153 lb 6.4 oz (69.6 kg)   Height: 5' 5\" (1.651 m)       Social History     Socioeconomic History    Marital status: Single     Spouse name: Not on file    Number of children: Not on file    Years of education: Not on file    Highest education level: Not on file   Occupational History    Not on file   Tobacco Use    Smoking status: Current Every Day Smoker     Packs/day: 0.25     Types: Cigarettes    Smokeless tobacco: Never Used   Vaping Use    Vaping Use: Some days    Substances: Nicotine, Flavoring   Substance and Sexual Activity    Alcohol use:  Yes     Alcohol/week: 15.0 standard drinks     Types: 15 Cans of beer per week     Comment: 15 Beers Daily, pt states he is down to 5 beers wkly    Drug use: Not Currently     Frequency: 2.0 times per week     Types: Cocaine     Comment: states he quit cocaine last 4/2021    Sexual activity: Yes     Partners: Female   Other Topics Concern    Not on file   Social History Narrative    Not on file     Social Determinants of Health     Financial Resource Strain:     Difficulty of Paying Living Expenses: Not on file   Food Insecurity:     Worried About Running Out of Food in the Last Year: Not on file    Shane of Food in the Last Year: Not on file   Transportation Needs:     Lack of Transportation (Medical): Not on file    Lack of Transportation (Non-Medical): Not on file   Physical Activity:     Days of Exercise per Week: Not on file    Minutes of Exercise per Session: Not on file   Stress:     Feeling of Stress : Not on file   Social Connections:     Frequency of Communication with Friends and Family: Not on file    Frequency of Social Gatherings with Friends and Family: Not on file    Attends Evangelical Services: Not on file    Active Member of [address] [place] [address] [employer] or Organizations: Not on file    Attends Club or Organization Meetings: Not on file    Marital Status: Not on file   Intimate Partner Violence:     Fear of Current or Ex-Partner: Not on file    Emotionally Abused: Not on file    Physically Abused: Not on file    Sexually Abused: Not on file   Housing Stability:     Unable to Pay for Housing in the Last Year: Not on file    Number of [place] in the Last Year: Not on file    Unstable Housing in the Last Year: Not on file       History reviewed. No pertinent family history. SUBJECTIVE: Yenifer Santiago presents to the office today for consult - dr Brandon Souza. Hx of alcohol abuse with 3 detox admissions in last year. Continues to work as a . No exercise    He complains of his HR accelerating rapidly with walking and denies classic angina,   exertional chest pressure/discomfort, fatigue, irregular heart beat, lower extremity edema, near-syncope, orthopnea, palpitations, paroxysmal nocturnal dyspnea, syncope and tachypnea. Lipid profile WNL      Review of Systems:   Heart: as above   Lungs: as above   Eyes: denies changes in vision or discharge. Ears: denies changes in hearing or pain.    Nose: denies epistaxis or masses   Throat: denies sore throat or trouble swallowing. Neuro: denies numbness, tingling, tremors. Skin: denies rashes or itching. : denies hematuria, dysuria   GI: denies vomiting, diarrhea   Psych: denies mood changed, anxiety, depression. all others negative. Physical Exam   /79   Pulse 66   Resp 18   Ht 5' 5\" (1.651 m)   Wt 153 lb 6.4 oz (69.6 kg)   BMI 25.53 kg/m²   Constitutional: Oriented to person, place, and time. Well-developed and well-nourished. No distress. Head: Normocephalic and atraumatic. Eyes: EOM are normal. Pupils are equal, round, and reactive to light. Neck: Normal range of motion. Neck supple. No hepatojugular reflux and no JVD present. Carotid bruit is not present. Cardiovascular: Normal rate, regular rhythm, normal heart sounds and intact distal pulses. Exam reveals no gallop and no friction rub. No murmur heard. Pulmonary/Chest: Effort normal and breath sounds normal. No respiratory distress. No wheezes. No rales. Abdominal: Soft. Bowel sounds are normal. No distension and no mass. No tenderness. No rebound and no guarding. Musculoskeletal: Normal range of motion. No edema and no tenderness. Neurological: Alert and oriented to person, place, and time. Skin: Skin is warm and dry. No rash noted. Not diaphoretic. No erythema. Psychiatric: anxious    EKG:  normal EKG, normal sinus rhythm, RSR'.     ASSESSMENT AND PLAN:  Patient Active Problem List   Diagnosis    Anxiety state    Elevated liver enzymes    Chest pain    History of alcohol use disorder    Alcohol related seizure ([address]     Multiple somatic complaints  Normal CV exam, normal CXR cardiac silhouette, normal EKG  Alcohol abuse  ETT-R for reassurance      Jose Serna M.D  [facility]

## 2022-02-18 ENCOUNTER — TELEPHONE (OUTPATIENT)
Dept: CARDIOLOGY | Age: 32
End: 2022-02-18

## 2022-02-21 ENCOUNTER — TELEPHONE (OUTPATIENT)
Dept: CARDIOLOGY CLINIC | Age: 32
End: 2022-02-21

## 2022-02-21 NOTE — TELEPHONE ENCOUNTER
Patient called in and stated that he is not scheduled for his stress test until 03/14/22 and he stated his heart rate keeps going up over 100 even at rest and he is wondering if he can get a monitor to see what is going on.   Please advise

## 2022-03-04 ENCOUNTER — OFFICE VISIT (OUTPATIENT)
Dept: FAMILY MEDICINE CLINIC | Age: 32
End: 2022-03-04
Payer: MEDICARE

## 2022-03-04 VITALS
BODY MASS INDEX: 26.13 KG/M2 | HEART RATE: 105 BPM | TEMPERATURE: 97.9 F | SYSTOLIC BLOOD PRESSURE: 148 MMHG | WEIGHT: 157 LBS | OXYGEN SATURATION: 100 % | DIASTOLIC BLOOD PRESSURE: 88 MMHG

## 2022-03-04 DIAGNOSIS — R52 BODY ACHES: Primary | ICD-10-CM

## 2022-03-04 DIAGNOSIS — M79.10 MYALGIA: ICD-10-CM

## 2022-03-04 PROCEDURE — G8484 FLU IMMUNIZE NO ADMIN: HCPCS | Performed by: PHYSICIAN ASSISTANT

## 2022-03-04 PROCEDURE — 99203 OFFICE O/P NEW LOW 30 MIN: CPT | Performed by: PHYSICIAN ASSISTANT

## 2022-03-04 PROCEDURE — G8419 CALC BMI OUT NRM PARAM NOF/U: HCPCS | Performed by: PHYSICIAN ASSISTANT

## 2022-03-04 PROCEDURE — G8427 DOCREV CUR MEDS BY ELIG CLIN: HCPCS | Performed by: PHYSICIAN ASSISTANT

## 2022-03-04 PROCEDURE — 4004F PT TOBACCO SCREEN RCVD TLK: CPT | Performed by: PHYSICIAN ASSISTANT

## 2022-03-04 NOTE — PROGRESS NOTES
3/4/22  Lorrie Pascal : 1990 Sex: male  Age 28 y.o. Subjective:  Chief Complaint   Patient presents with    Headache     all sx x1. 5years/hx of tick bites     Generalized Body Aches         HPI:   Lorrie Pascal , 28 y.o. male presents to [facility] care for evaluation of headache, myalgias. The patient has had a headache, generalized myalgias ongoing for about a year and a half. The patient has a history of getting tick bites. 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 The patient states that he was concerned for the possibility of Lyme disease. The patient is here to have evaluation and testing for Lyme disease. He does not have any current tick bites. He is also being worked up for some palpitations and tachycardia. He has a upcoming stress test.      ROS:   Unless otherwise stated in this report the patient's positive and negative responses for review of systems for constitutional, eyes, ENT, cardiovascular, respiratory, gastrointestinal, neurological, , musculoskeletal, and integument systems and related systems to the presenting problem are either stated in the history of present illness or were not pertinent or were negative for the symptoms and/or complaints related to the presenting medical problem. Positives and pertinent negatives as per HPI. All others reviewed and are negative. 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 No pertinent surgical history. History reviewed. No pertinent family history.     Medications:     Current Outpatient Medications:     baclofen (LIORESAL) 10 MG tablet, , Disp: , Rfl:     cloNIDine (CATAPRES) 0.1 MG tablet, , Disp: , Rfl:    ondansetron (ZOFRAN) 4 MG tablet, , Disp: , Rfl:     melatonin 5 MG TABS tablet, , Disp: , Rfl:     hydrOXYzine (VISTARIL) 50 MG capsule, Take 50 mg by mouth 4 times daily as needed for Itching, Disp: , Rfl:     Tadalafil (CIALIS PO), Take by mouth, Disp: , Rfl:     Allergies: Allergies   Allergen Reactions    Cefazolin     Cefprozil Rash       Social History:     Social History     Tobacco Use    Smoking status: Current Every Day Smoker     Packs/day: 0.25     Types: Cigarettes    Smokeless tobacco: Never Used   Vaping Use    Vaping Use: Some days    Substances: Nicotine, Flavoring   Substance Use Topics    Alcohol use: Yes     Alcohol/week: 15.0 standard drinks     Types: 15 Cans of beer per week     Comment: 15 Beers Daily, pt states he is down to 5 beers [employer] Drug use: Not Currently     Frequency: 2.0 times per week     Types: Cocaine     Comment: states he quit cocaine last 4/2021       Patient lives at home. Physical Exam:     Vitals:    03/04/22 1431   BP: (!) 148/88   Pulse: 105   Temp: 97.9 °F (36.6 °C)   SpO2: 100%   Weight: 157 lb (71.2 kg)       Exam:  Physical Exam  Nurses note and vital signs reviewed and patient is not hypoxic. General: The patient appears well and in no apparent distress. Patient is resting comfortably on cart. Skin: Warm, dry, no pallor noted. There is no rash noted. Head: Normocephalic, atraumatic. Eye: Normal conjunctiva  Ears, Nose, Mouth, and Throat: Wearing a mask  Cardiovascular: Regular Rate and Rhythm  Respiratory: Patient is in no distress, no accessory muscle use, lungs are clear to auscultation, no wheezing, crackles or rhonchi  Musculoskeletal: No obvious deformity noted to the upper or lower extremities  Neurological: A&O x4, normal speech        Testing:           Medical Decision Making:     Vital signs reviewed    Past medical history reviewed. Allergies reviewed. Medications reviewed.     Patient on arrival does not appear to be in any apparent distress or discomfort. The patient has been seen and evaluated. The patient does not appear to be toxic or lethargic. The patient will be sent for Lyme titers. The patient will be updated with the results. Is currently asymptomatic other than having some generalized myalgias and arthralgias. The patient will be updated with the results and we will discuss further treatment at that point. The patient is to return to express care or go directly to the emergency department should any of the signs or symptoms worsen. The patient is to followup with primary care physician in 2-3 days for repeat evaluation. The patient has no other questions or concerns at this time the patient will be discharged home. Clinical Impression:   Jeanine Tamez was seen today for headache and generalized body aches. Diagnoses and all orders for this visit:    Body aches  -     Cancel: POCT Influenza A/B  -     Cancel: POCT COVID-19 Rapid, NAAT    Myalgia  -     Lyme Disease Acute Reflexive Panel; Future        The patient is to call for any concerns or return if any of the signs or symptoms worsen. The patient is to follow-up with PCP in the next 2-3 days for repeat evaluation repeat assessment or go directly to the emergency department.      SIGNATURE: Floyd Urena III, PA-C

## 2022-03-08 ENCOUNTER — OFFICE VISIT (OUTPATIENT)
Dept: ENT CLINIC | Age: 32
End: 2022-03-08
Payer: MEDICARE

## 2022-03-08 VITALS — HEIGHT: 65 IN | WEIGHT: 157 LBS | BODY MASS INDEX: 26.16 KG/M2

## 2022-03-08 DIAGNOSIS — J32.0 CHRONIC MAXILLARY SINUSITIS: Primary | ICD-10-CM

## 2022-03-08 LAB — LYME, EIA: 0.55 LIV (ref 0–1.2)

## 2022-03-08 PROCEDURE — 4004F PT TOBACCO SCREEN RCVD TLK: CPT | Performed by: OTOLARYNGOLOGY

## 2022-03-08 PROCEDURE — G8427 DOCREV CUR MEDS BY ELIG CLIN: HCPCS | Performed by: OTOLARYNGOLOGY

## 2022-03-08 PROCEDURE — 99204 OFFICE O/P NEW MOD 45 MIN: CPT | Performed by: OTOLARYNGOLOGY

## 2022-03-08 PROCEDURE — G8419 CALC BMI OUT NRM PARAM NOF/U: HCPCS | Performed by: OTOLARYNGOLOGY

## 2022-03-08 PROCEDURE — G8484 FLU IMMUNIZE NO ADMIN: HCPCS | Performed by: OTOLARYNGOLOGY

## 2022-03-08 RX ORDER — FLUTICASONE PROPIONATE 50 MCG
2 SPRAY, SUSPENSION (ML) NASAL DAILY
Qty: 16 G | Refills: 5 | Status: SHIPPED
Start: 2022-03-08 | End: 2022-04-28 | Stop reason: ALTCHOICE

## 2022-03-08 ASSESSMENT — ENCOUNTER SYMPTOMS
GASTROINTESTINAL NEGATIVE: 1
COUGH: 0
SINUS PRESSURE: 1
STRIDOR: 0
CHOKING: 0
COLOR CHANGE: 0
TROUBLE SWALLOWING: 0
VOICE CHANGE: 0
WHEEZING: 0
RHINORRHEA: 1
SORE THROAT: 0
SINUS PAIN: 0

## 2022-03-08 ASSESSMENT — VISUAL ACUITY: OU: 1

## 2022-03-08 NOTE — PROGRESS NOTES
Subjective:      Patient ID:  Harry Mas is a 28 y.o. male. HPI:    Sinusitis  Patientpresents with chronic sinusitis. 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 Gets multiple sinus infections a year. Currently on amoxicillin for sinus infection. Has not been using any nasal sprays or allergy medications. Patient's medications, allergies, past medical, surgical, social and family histories were reviewed and updated as appropriate. Review of Systems   Constitutional: Negative for activity change, fatigue and fever. HENT: Positive for congestion, postnasal drip, rhinorrhea and sinus pressure. Negative for ear discharge, ear pain, hearing loss, nosebleeds, sinus pain, sore throat, tinnitus, trouble swallowing and voice change. Respiratory: Negative for cough, choking, wheezing and stridor. Cardiovascular: Negative for chest pain. Gastrointestinal: Negative. Genitourinary: Negative. Skin: Negative for color change and rash. Neurological: Negative for speech difficulty, light-headedness, numbness and headaches. Hematological: Negative for adenopathy. Psychiatric/Behavioral: Negative for behavioral problems. Objective:   Physical Exam  Constitutional:       Appearance: Normal appearance. He is normal weight. HENT:      Head: Normocephalic and atraumatic. Right Ear: Tympanic membrane, ear canal and external ear normal. No drainage. Left Ear: Tympanic membrane, ear canal and external ear normal. No drainage. Nose: Septal deviation present. No nasal deformity. Mouth/Throat:      Mouth: Mucous membranes are moist.   Eyes:      General: Lids are normal. Vision grossly intact. Extraocular Movements: Extraocular movements intact. Conjunctiva/sclera: Conjunctivae normal.      Pupils: Pupils are equal, round, and reactive to light. Cardiovascular:      Rate and Rhythm: Normal rate.    Pulmonary: Effort: Pulmonary effort is normal.   Musculoskeletal:         General: Normal range of motion. Cervical back: Normal range of motion. Lymphadenopathy:      Cervical: No cervical adenopathy. Skin:     Capillary Refill: Capillary refill takes less than 2 seconds. Neurological:      Mental Status: He is alert. Psychiatric:         Mood and Affect: Mood normal.                 Assessment:       Diagnosis Orders   1. Chronic maxillary sinusitis                Plan:      Flonase every day, 2 sprays each nostril every day. Follow up in 8 weeks. Rob Saucedo  1990    I have discussed the case, including pertinent history and exam findings with the resident. I have seen and examined the patient and the key elements of the encounter have been performed by me. I agree with the assessment, plan and orders as documented by the  resident              Remainder of medical problems as per  resident note. Patient seen and examined. Agree with above exam, assessment and plan.       Electronically signed by Carola Jennings DO on 3/17/22 at 10:34 PM EDT

## 2022-03-10 ENCOUNTER — TELEPHONE (OUTPATIENT)
Dept: CARDIOLOGY | Age: 32
End: 2022-03-10

## 2022-03-10 NOTE — TELEPHONE ENCOUNTER
Left message for patient to confirm stress test.  Instructions given include: no holds on meds, hold all caffeine 12 hours prior to test, okay to have a light breakfast.  Advised to call with any questions.

## 2022-03-14 ENCOUNTER — HOSPITAL ENCOUNTER (OUTPATIENT)
Dept: CARDIOLOGY | Age: 32
Discharge: HOME | End: 2022-03-14
Payer: MEDICARE

## 2022-03-14 ENCOUNTER — TELEPHONE (OUTPATIENT)
Dept: CARDIOLOGY CLINIC | Age: 32
End: 2022-03-14

## 2022-03-14 VITALS
OXYGEN SATURATION: 97 % | RESPIRATION RATE: 20 BRPM | DIASTOLIC BLOOD PRESSURE: 82 MMHG | HEIGHT: 65 IN | HEART RATE: 83 BPM | WEIGHT: 155 LBS | SYSTOLIC BLOOD PRESSURE: 120 MMHG | BODY MASS INDEX: 25.83 KG/M2

## 2022-03-14 DIAGNOSIS — R07.9 CHEST PAIN, UNSPECIFIED TYPE: ICD-10-CM

## 2022-03-14 PROCEDURE — 93017 CV STRESS TEST TRACING ONLY: CPT

## 2022-03-14 RX ORDER — AMOXICILLIN 500 MG/1
500 TABLET, FILM COATED ORAL
COMMUNITY
Start: 2022-02-28 | End: 2022-04-13

## 2022-03-14 NOTE — PROCEDURES
[address] [facility] [address] [address] [address]  [phone]    Exercise Stress Study (non-nuclear)      Name: Ambreen Beckham Account Number:  [de-identified]      :  1990          Sex: male         Date of Study:  3/14/2022    Height: 5' 5\" (165.1 cm)          Weight: 155 lb (70.3 kg)    Ordering Provider: Jacklyn Salcido MD          PCP: Teresa Araya DO    Cardiologist: Jacklyn Salcido MD        Interpreting Physician: Tad Jane DO     Indication:   Detecting the presence and location of coronary artery disease    Clinical History:   Patient has no known history of coronary artery disease. Resting ECG:    Normal sinus rhythm    Exercise: The patient exercised using a Javon protocol, completing 9:21 minutes and reaching an estimated work load of 64.3 metabolic equivalents (METS). Resting HR was 83. Peak exercise heart rate was 170 ( 89% of maximum predicted heart rate for age). Baseline /82. Peak exercise /80. The blood pressure response to exercise was normal      Exercise was terminated due to dyspnea, patient request, heart rate attained and left knee pain . The patient experienced no chest pain with exercise. Pulse oximetry was used to monitor oxygen saturation during the stress test.  The study was performed on [employer]. The resting pulse oximeter was 97%. The lowest O2 saturation seen during exercise was 95 %. The average O2 saturation with exercise was 96 %. Exercise ECG:   The patient demonstrated no arrhythmias during exercise. With exercise, there were no ST segment changes of significance at the heart rate achieved. Phan treadmill score was 9.5 implying low risk. Impression:    1. Exercise EKG was negative. 2. The patient experienced no chest pain with exercise. 3. Phan treadmill score was 9.5 implying low risk. 4. Exercise capacity was average. 5. Low risk general exercise treadmill test.    Thank you for sending your patient to this [employer].     Electronically signed by Joseph Smith DO on 3/14/22 at 12:28 PM EDT

## 2022-03-18 ENCOUNTER — OFFICE VISIT (OUTPATIENT)
Dept: FAMILY MEDICINE CLINIC | Age: 32
End: 2022-03-18
Payer: MEDICARE

## 2022-03-18 ENCOUNTER — HOSPITAL ENCOUNTER (OUTPATIENT)
Dept: ULTRASOUND IMAGING | Age: 32
Discharge: HOME | End: 2022-03-20
Payer: MEDICARE

## 2022-03-18 ENCOUNTER — HOSPITAL ENCOUNTER (OUTPATIENT)
Dept: GENERAL RADIOLOGY | Age: 32
Discharge: HOME | End: 2022-03-20
Payer: MEDICARE

## 2022-03-18 ENCOUNTER — HOSPITAL ENCOUNTER (OUTPATIENT)
Age: 32
Discharge: HOME | End: 2022-03-18
Payer: MEDICARE

## 2022-03-18 VITALS
HEART RATE: 114 BPM | SYSTOLIC BLOOD PRESSURE: 142 MMHG | BODY MASS INDEX: 25.96 KG/M2 | TEMPERATURE: 97 F | DIASTOLIC BLOOD PRESSURE: 90 MMHG | OXYGEN SATURATION: 98 % | WEIGHT: 156 LBS

## 2022-03-18 DIAGNOSIS — M79.10 MYALGIA: ICD-10-CM

## 2022-03-18 DIAGNOSIS — M25.562 ACUTE PAIN OF LEFT KNEE: ICD-10-CM

## 2022-03-18 DIAGNOSIS — R31.9 URINARY TRACT INFECTION WITH HEMATURIA, SITE UNSPECIFIED: ICD-10-CM

## 2022-03-18 DIAGNOSIS — M25.562 ACUTE PAIN OF LEFT KNEE: Primary | ICD-10-CM

## 2022-03-18 DIAGNOSIS — R53.83 FATIGUE, UNSPECIFIED TYPE: ICD-10-CM

## 2022-03-18 DIAGNOSIS — R30.0 DYSURIA: ICD-10-CM

## 2022-03-18 DIAGNOSIS — N39.0 URINARY TRACT INFECTION WITH HEMATURIA, SITE UNSPECIFIED: ICD-10-CM

## 2022-03-18 LAB
ALBUMIN SERPL-MCNC: 4.8 G/DL (ref 3.5–5.2)
ALP BLD-CCNC: 98 U/L (ref 40–129)
ALT SERPL-CCNC: 66 U/L (ref 0–40)
AMMONIA: 38.4 UMOL/L (ref 16–60)
ANION GAP SERPL CALCULATED.3IONS-SCNC: 13 MMOL/L (ref 7–16)
AST SERPL-CCNC: 36 U/L (ref 0–39)
BASOPHILS ABSOLUTE: 0.02 E9/L (ref 0–0.2)
BASOPHILS RELATIVE PERCENT: 0.4 % (ref 0–2)
BILIRUB SERPL-MCNC: 1.3 MG/DL (ref 0–1.2)
BILIRUBIN, POC: NORMAL
BLOOD URINE, POC: NORMAL
BUN BLDV-MCNC: 9 MG/DL (ref 6–20)
CALCIUM SERPL-MCNC: 9.7 MG/DL (ref 8.6–10.2)
CHLORIDE BLD-SCNC: 102 MMOL/L (ref 98–107)
CLARITY, POC: CLEAR
CO2: 21 MMOL/L (ref 22–29)
COLOR, POC: YELLOW
CREAT SERPL-MCNC: 0.7 MG/DL (ref 0.7–1.2)
EOSINOPHILS ABSOLUTE: 0.06 E9/L (ref 0.05–0.5)
EOSINOPHILS RELATIVE PERCENT: 1.1 % (ref 0–6)
GFR AFRICAN AMERICAN: >60
GFR NON-AFRICAN AMERICAN: >60 ML/MIN/1.73
GLUCOSE BLD-MCNC: 90 MG/DL (ref 74–99)
GLUCOSE URINE, POC: NORMAL
HCT VFR BLD CALC: 39 % (ref 37–54)
HEMOGLOBIN: 13.3 G/DL (ref 12.5–16.5)
IMMATURE GRANULOCYTES #: 0.01 E9/L
IMMATURE GRANULOCYTES %: 0.2 % (ref 0–5)
KETONES, POC: NORMAL
LEUKOCYTE EST, POC: NORMAL
LIPASE: 43 U/L (ref 13–60)
LYMPHOCYTES ABSOLUTE: 1.96 E9/L (ref 1.5–4)
LYMPHOCYTES RELATIVE PERCENT: 36.7 % (ref 20–42)
MCH RBC QN AUTO: 29.7 PG (ref 26–35)
MCHC RBC AUTO-ENTMCNC: 34.1 % (ref 32–34.5)
MCV RBC AUTO: 87.1 FL (ref 80–99.9)
MONO TEST: NEGATIVE
MONOCYTES ABSOLUTE: 0.56 E9/L (ref 0.1–0.95)
MONOCYTES RELATIVE PERCENT: 10.5 % (ref 2–12)
NEUTROPHILS ABSOLUTE: 2.73 E9/L (ref 1.8–7.3)
NEUTROPHILS RELATIVE PERCENT: 51.1 % (ref 43–80)
NITRITE, POC: NORMAL
PDW BLD-RTO: 12.2 FL (ref 11.5–15)
PH, POC: 5.5
PLATELET # BLD: 164 E9/L (ref 130–450)
PMV BLD AUTO: 10.4 FL (ref 7–12)
POTASSIUM SERPL-SCNC: 4.1 MMOL/L (ref 3.5–5)
PROTEIN, POC: NORMAL
RBC # BLD: 4.48 E12/L (ref 3.8–5.8)
SODIUM BLD-SCNC: 136 MMOL/L (ref 132–146)
SPECIFIC GRAVITY, POC: >=1.03
TOTAL PROTEIN: 7.2 G/DL (ref 6.4–8.3)
TSH SERPL DL<=0.05 MIU/L-ACNC: 2.25 UIU/ML (ref 0.27–4.2)
UROBILINOGEN, POC: NORMAL
WBC # BLD: 5.3 E9/L (ref 4.5–11.5)

## 2022-03-18 PROCEDURE — G8427 DOCREV CUR MEDS BY ELIG CLIN: HCPCS | Performed by: PHYSICIAN ASSISTANT

## 2022-03-18 PROCEDURE — G8419 CALC BMI OUT NRM PARAM NOF/U: HCPCS | Performed by: PHYSICIAN ASSISTANT

## 2022-03-18 PROCEDURE — 99214 OFFICE O/P EST MOD 30 MIN: CPT | Performed by: PHYSICIAN ASSISTANT

## 2022-03-18 PROCEDURE — G8484 FLU IMMUNIZE NO ADMIN: HCPCS | Performed by: PHYSICIAN ASSISTANT

## 2022-03-18 PROCEDURE — 83690 ASSAY OF LIPASE: CPT

## 2022-03-18 PROCEDURE — 4004F PT TOBACCO SCREEN RCVD TLK: CPT | Performed by: PHYSICIAN ASSISTANT

## 2022-03-18 PROCEDURE — 73564 X-RAY EXAM KNEE 4 OR MORE: CPT

## 2022-03-18 PROCEDURE — 93971 EXTREMITY STUDY: CPT

## 2022-03-18 PROCEDURE — 85025 COMPLETE CBC W/AUTO DIFF WBC: CPT

## 2022-03-18 PROCEDURE — 82140 ASSAY OF AMMONIA: CPT

## 2022-03-18 PROCEDURE — 87088 URINE BACTERIA CULTURE: CPT

## 2022-03-18 PROCEDURE — 86308 HETEROPHILE ANTIBODY SCREEN: CPT

## 2022-03-18 PROCEDURE — 80053 COMPREHEN METABOLIC PANEL: CPT

## 2022-03-18 PROCEDURE — 87040 BLOOD CULTURE FOR BACTERIA: CPT

## 2022-03-18 PROCEDURE — 36415 COLL VENOUS BLD VENIPUNCTURE: CPT

## 2022-03-18 PROCEDURE — 84443 ASSAY THYROID STIM HORMONE: CPT

## 2022-03-18 NOTE — PROGRESS NOTES
3/18/22  Iris Marin : 1990 Sex: male  Age 28 y.o. Subjective:  Chief Complaint   Patient presents with    Fatigue     all sx since last summer     Generalized Body Aches    Urinary Tract Infection    Back Pain    Abdominal Pain         HPI:   Iris Marin , 28 y.o. male presents to [facility] care for evaluation of fatigue, generalized myalgias, possible urinary tract infection, back pain, left knee pain      HPI  52-year-old male presents to [facility] for evaluation of multiple complaints. The patient states that he feels fatigued, he has some trouble sleeping. The patient has a history of urethral stricture and believes that he may have a UTI. The patient states that he has been gaining weight. The patient is just increasingly fatigued have it has a hard time concentrating. The patient just was recently seen and evaluated for some generalized myalgias and had the concern for the possibility of Lyme disease but his Lyme titers were negative. The patient also notes that he is been complaining of left knee pain. He had to do an exercise stress test and had to stop because his left knee was bothering him. ROS:   Unless otherwise stated in this report the patient's positive and negative responses for review of systems for constitutional, eyes, ENT, cardiovascular, respiratory, gastrointestinal, neurological, , musculoskeletal, and integument systems and related systems to the presenting problem are either stated in the history of present illness or were not pertinent or were negative for the symptoms and/or complaints related to the presenting medical problem. Positives and pertinent negatives as per HPI. All others reviewed and are negative.       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 No pertinent surgical history. Family History   Problem Relation Age of Onset    High Cholesterol Father        Medications:     Current Outpatient Medications:     Amoxicillin 500 MG TABS, Take 500 mg by mouth, Disp: , Rfl:     fluticasone (FLONASE) 50 MCG/ACT nasal spray, 2 sprays by Each Nostril route daily, Disp: 16 g, Rfl: 5    ondansetron (ZOFRAN) 4 MG tablet, Take by mouth as needed , Disp: , Rfl:     melatonin 5 MG TABS tablet, 10 mg nightly as needed , Disp: , Rfl:     Allergies: Allergies   Allergen Reactions    Cefazolin     Cefprozil Rash       Social History:     Social History     Tobacco Use    Smoking status: Current Every Day Smoker     Packs/day: 0.25     Types: Cigarettes    Smokeless tobacco: Never Used   Vaping Use    Vaping Use: Some days    Substances: Nicotine, Flavoring   Substance Use Topics    Alcohol use: Yes     Alcohol/week: 15.0 standard drinks     Types: 15 Cans of beer per week     Comment: 15 Beers Daily, pt states he is down to 5 beers [employer] Drug use: Not Currently     Frequency: 2.0 times per week     Types: Cocaine     Comment: states he quit cocaine last 4/2021       Patient lives at home. Physical Exam:     Vitals:    03/18/22 1545   BP: (!) 142/90   Pulse: 114   Temp: 97 °F (36.1 °C)   SpO2: 98%   Weight: 156 lb (70.8 kg)       Exam:  Physical Exam  Nurses note and vital signs reviewed and patient is not hypoxic. General: The patient appears well and in no apparent distress. Patient is resting comfortably on cart. Skin: Warm, dry, no pallor noted. There is no rash noted. Head: Normocephalic, atraumatic. Eye: Normal conjunctiva  Ears, Nose, Mouth, and Throat: Wearing a mask  Cardiovascular: Regular Rate  Respiratory: Patient is in no distress, no accessory muscle use, no wheezing, speaks in full complete sentences.   GI: Normal bowel sounds, no tenderness to palpation, no masses 16 mmol/L  13    GLUCOSE, FASTING,GF      74 - 99 mg/dL  90    BUN,BUNPL      6 - 20 mg/dL  9    Creatinine      0.7 - 1.2 mg/dL  0.7    GFR Non-African American      >=60 mL/min/1.73  >60    GFR         >60    CALCIUM, SERUM, [id number]      8.6 - 10.2 mg/dL  9.7    Total Protein      6.4 - 8.3 g/dL  7.2    Albumin      3.5 - 5.2 g/dL  4.8    Bilirubin      0.0 - 1.2 mg/dL  1.3 (H)    Alk Phos      40 - 129 U/L  98    ALT      0 - 40 U/L  66 (H)    AST      0 - 39 U/L  36    AMMONIA, PLASMA, [id number]      16.0 - 60.0 umol/L      Mono Test      Negative      TSH      0.270 - 4.200 uIU/mL      Lipase      13 - 60 U/L   43   Culture, Blood 2       24 Hours no growth       Component      Latest Ref Rng & Units 3/18/2022 3/18/2022 3/18/2022 3/18/2022           5:19 PM  5:19 PM  5:19 PM  5:19 PM   WBC      4.5 - 11.5 E9/L    5.3   RBC      3.80 - 5.80 E12/L    4.48   Hemoglobin Quant      12.5 - 16.5 g/dL    13.3   Hematocrit      37.0 - 54.0 %    39.0   MCV      80.0 - 99.9 fL    87.1   MCH      26.0 - 35.0 pg    29.7   MCHC      32.0 - 34.5 %    34.1   RDW      11.5 - 15.0 fL    12.2   Platelet Count      557 - 450 E9/L    164   MPV      7.0 - 12.0 fL    10.4   Neutrophils %      43.0 - 80.0 %    51.1   Immature Granulocytes %      0.0 - 5.0 %    0.2   Lymphocyte %      20.0 - 42.0 %    36.7   Monocytes %      2.0 - 12.0 %    10.5   Eosinophils %      0.0 - 6.0 %    1.1   Basophils %      0.0 - 2.0 %    0.4   Neutrophils Absolute      1.80 - 7.30 E9/L    2.73   Immature Granulocytes #      E9/L    0.01   Lymphocytes Absolute      1.50 - 4.00 E9/L    1.96   Monocytes Absolute      0.10 - 0.95 E9/L    0.56   Eosinophils Absolute      0.05 - 0.50 E9/L    0.06   Basophils Absolute      0.00 - 0.20 E9/L    0.02   Sodium      132 - 146 mmol/L       Potassium      3.5 - 5.0 mmol/L       Chloride      98 - 107 mmol/L       CO2      22 - 29 mmol/L       Anion Gap      7 - 16 mmol/L       GLUCOSE, FASTING,GF      74 - 99 mg/dL       BUN,BUNPL      6 - 20 mg/dL       Creatinine      0.7 - 1.2 mg/dL       GFR Non-African American      >=60 mL/min/1.73       GFR              CALCIUM, SERUM, [id number]      8.6 - 10.2 mg/dL       Total Protein      6.4 - 8.3 g/dL       Albumin      3.5 - 5.2 g/dL       Bilirubin      0.0 - 1.2 mg/dL       Alk Phos      40 - 129 U/L       ALT      0 - 40 U/L       AST      0 - 39 U/L       AMMONIA, PLASMA, [id number]      16.0 - 60.0 umol/L  38.4     Mono Test      Negative   Negative    TSH      0.270 - 4.200 uIU/mL 2.250      Lipase      13 - 60 U/L       Culture, Blood 2               XR KNEE LEFT (MIN 4 VIEWS)    Result Date: 3/18/2022  EXAMINATION: FOUR XRAY VIEWS OF THE LEFT KNEE 3/18/2022 6:00 pm COMPARISON: None. HISTORY: ORDERING SYSTEM PROVIDED HISTORY: Acute pain of left knee TECHNOLOGIST PROVIDED HISTORY: Reason for exam:->left knee pain FINDINGS: Radiographs of the left knee demonstrate no fractures with preserved alignment. Joint spaces appear normal. No significant degenerative changes. Osseous mineralization is normal.  There is no large joint effusion. No acute osseous findings about the left knee. Normal alignment. RECOMMENDATION: In the setting of trauma, if there is persistent symptoms and physical exam warrants a repeat radiograph in 10-14 days could be considered as occult fractures may not be evident on initial imaging evaluation. US DUP LOWER EXTREMITY LEFT TOMMY    Result Date: 3/18/2022  EXAMINATION: DUPLEX VENOUS ULTRASOUND OF THE LEFT LOWER EXTREMITY 3/18/2022 5:36 pm TECHNIQUE: Duplex ultrasound using B-mode/gray scaled imaging and Doppler spectral analysis and color flow was obtained of the deep venous structures of the left lower extremity.  COMPARISON: None used HISTORY: ORDERING SYSTEM PROVIDED HISTORY: Acute pain of left knee TECHNOLOGIST PROVIDED HISTORY: Reason for exam:->left knee pain What reading provider will be dictating this exam?->CRC FINDINGS: The visualized veins of the left lower extremity are patent and free of echogenic thrombus. The veins demonstrate good compressibility with normal color flow study and spectral analysis. No evidence of DVT in the left lower extremity. Medical Decision Making:     Vital signs reviewed    Past medical history reviewed. Allergies reviewed. Medications reviewed. Patient on arrival does not appear to be in any apparent distress or discomfort. The patient has been seen and evaluated. The patient does not appear to be toxic or lethargic. The patient was sent for laboratory studies. The patient had urinalysis performed. The patient also was sent for a STAT x-ray of the left knee and an ultrasound of the left lower extremity. No evidence of a DVT. X-ray of the left knee did not show any evidence of acute osseous abnormality. Laboratory studies were essentially unremarkable other than elevated bilirubin at 1.3 and a ALT of 66. The patient's urinalysis did not show any signs of a urinary tract infection. The patient is to return to express care or go directly to the emergency department should any of the signs or symptoms worsen. The patient is to followup with primary care physician in 2-3 days for repeat evaluation. The patient has no other questions or concerns at this time the patient will be discharged home. Clinical Impression:   Kandice Paniagua was seen today for fatigue, generalized body aches, urinary tract infection, back pain and abdominal pain. Diagnoses and all orders for this visit:    Acute pain of left knee  -     US DUP LOWER EXTREMITY LEFT TOMMY; Future  -     XR KNEE LEFT (MIN 4 VIEWS); Future    Myalgia    Dysuria    Fatigue, unspecified type    Other orders  -     POCT Urinalysis no Micro  -     Culture, Urine; Future  -     CBC with Auto Differential; Future  -     Comprehensive Metabolic Panel; Future  -     Lipase;  Future  -     Culture, Blood 2; Future  - Culture, Blood 2; Future  -     TSH; Future  -     MONONUCLEOSIS SCREEN; Future  -     Ammonia; Future        The patient is to call for any concerns or return if any of the signs or symptoms worsen. The patient is to follow-up with PCP in the next 2-3 days for repeat evaluation repeat assessment or go directly to the emergency department.      SIGNATURE: Sandra Miguel III, PA-C

## 2022-03-21 LAB
URINE CULTURE, ROUTINE: NORMAL
URINE CULTURE, ROUTINE: NORMAL

## 2022-03-23 LAB
CULTURE, BLOOD 2: NORMAL
CULTURE, BLOOD 2: NORMAL

## 2022-04-03 DIAGNOSIS — F41.1 GENERALIZED ANXIETY DISORDER: ICD-10-CM

## 2022-04-03 DIAGNOSIS — Z87.898 HISTORY OF ALCOHOL USE DISORDER: ICD-10-CM

## 2022-04-03 DIAGNOSIS — G44.209 MIXED MIGRAINE AND MUSCLE CONTRACTION HEADACHE: ICD-10-CM

## 2022-04-03 DIAGNOSIS — G43.909 MIXED MIGRAINE AND MUSCLE CONTRACTION HEADACHE: ICD-10-CM

## 2022-04-05 RX ORDER — TOPIRAMATE 25 MG/1
TABLET ORAL
Qty: 90 TABLET | Refills: 2 | Status: SHIPPED
Start: 2022-04-05 | End: 2022-04-13

## 2022-04-13 ENCOUNTER — OFFICE VISIT (OUTPATIENT)
Dept: CARDIOLOGY CLINIC | Age: 32
End: 2022-04-13
Payer: MEDICARE

## 2022-04-13 VITALS
WEIGHT: 150.6 LBS | HEART RATE: 74 BPM | BODY MASS INDEX: 25.09 KG/M2 | SYSTOLIC BLOOD PRESSURE: 130 MMHG | OXYGEN SATURATION: 98 % | HEIGHT: 65 IN | RESPIRATION RATE: 18 BRPM | DIASTOLIC BLOOD PRESSURE: 84 MMHG

## 2022-04-13 DIAGNOSIS — R07.9 CHEST PAIN, UNSPECIFIED TYPE: Primary | ICD-10-CM

## 2022-04-13 PROCEDURE — G8427 DOCREV CUR MEDS BY ELIG CLIN: HCPCS | Performed by: INTERNAL MEDICINE

## 2022-04-13 PROCEDURE — 99202 OFFICE O/P NEW SF 15 MIN: CPT | Performed by: INTERNAL MEDICINE

## 2022-04-13 PROCEDURE — G8419 CALC BMI OUT NRM PARAM NOF/U: HCPCS | Performed by: INTERNAL MEDICINE

## 2022-04-13 PROCEDURE — 93000 ELECTROCARDIOGRAM COMPLETE: CPT | Performed by: INTERNAL MEDICINE

## 2022-04-13 NOTE — PROGRESS NOTES
Chief Complaint   Patient presents with    Chest Pain       Patient Active Problem List    Diagnosis Date Noted    History of alcohol use disorder 12/20/2021    Alcohol related seizure ([address] 12/20/2021     Overview Note:     Hx binge drinking      Anxiety state 09/18/2014    Elevated liver enzymes 09/18/2014    Chest pain 09/18/2014       Current Outpatient Medications   Medication Sig Dispense Refill    fluticasone (FLONASE) 50 MCG/ACT nasal spray 2 sprays by Each Nostril route daily 16 g 5    melatonin 5 MG TABS tablet 10 mg nightly as needed        No current facility-administered medications for this visit.         Allergies   Allergen Reactions    Cefazolin     Cefprozil Rash       Vitals:    04/13/22 1301   BP: 130/84   Pulse: 74   Resp: 18   SpO2: 98%   Weight: 150 lb 9.6 oz (68.3 kg)   Height: 5' 5\" (1.651 m)       Social History     Socioeconomic History    Marital status: Single     Spouse name: Not on file    Number of children: Not on file    Years of education: Not on file    Highest education level: Not on file   Occupational History    Not on file   Tobacco Use    Smoking status: Current Every Day Smoker     Packs/day: 0.25     Types: Cigarettes    Smokeless tobacco: Never Used   Vaping Use    Vaping Use: Some days    Substances: Nicotine, Flavoring    Passive vaping exposure: Yes   Substance and Sexual Activity    Alcohol use: Not Currently     Alcohol/week: 15.0 standard drinks     Types: 15 Cans of beer per week     Comment: 15 Beers Daily, pt states he is down to 5 beers wkly    Drug use: Not Currently     Frequency: 2.0 times per week     Types: Cocaine     Comment: states he quit cocaine last 4/2021    Sexual activity: Yes     Partners: Female   Other Topics Concern    Not on file   Social History Narrative    Not on file     Social Determinants of Health     Financial Resource Strain:     Difficulty of Paying Living Expenses: Not on file   Food Insecurity:     Worried About Running Out of Food in the Last Year: Not on file    Ran Out of Food in the Last Year: Not on file   Transportation Needs:     Lack of Transportation (Medical): Not on file    Lack of Transportation (Non-Medical): Not on file   Physical Activity:     Days of Exercise per Week: Not on file    Minutes of Exercise per Session: Not on file   Stress:     Feeling of Stress : Not on file   Social Connections:     Frequency of Communication with Friends and Family: Not on file    Frequency of Social Gatherings with Friends and Family: Not on file    Attends [employer] Services: Not on file    Active Member of [address] [place] [address] [employer] or Organizations: Not on file    Attends Club or Organization Meetings: Not on file    Marital Status: Not on file   Intimate Partner Violence:     Fear of Current or Ex-Partner: Not on file    Emotionally Abused: Not on file    Physically Abused: Not on file    Sexually Abused: Not on file   Housing Stability:     Unable to Pay for Housing in the Last Year: Not on file    Number of [place] in the Last Year: Not on file    Unstable Housing in the Last Year: Not on file       Family History   Problem Relation Age of Onset    High Cholesterol Father          SUBJECTIVE: Terri Davis presents to the office today for f/u at his request.  Had a normal stress test and was fine until Friday when he began with a litany of compliants that also included memory loss and various other non cardiac concerns. Hx of alcohol abuse with 3 detox admissions in last year. Continues to work as a . Review of Systems:   Heart: as above   Lungs: as above   Eyes: denies changes in vision or discharge. Ears: denies changes in hearing or pain. Nose: denies epistaxis or masses   Throat: denies sore throat or trouble swallowing. Neuro: denies numbness, tingling, tremors. Skin: denies rashes or itching.    : denies hematuria, dysuria   GI: denies vomiting, diarrhea   Psych: denies mood changed, anxiety, depression. all others negative. Physical Exam   /84   Pulse 74   Resp 18   Ht 5' 5\" (1.651 m)   Wt 150 lb 9.6 oz (68.3 kg)   SpO2 98%   BMI 25.06 kg/m²   Constitutional: Oriented to person, place, and time. Well-developed and well-nourished. No distress. Head: Normocephalic and atraumatic. Eyes: EOM are normal. Pupils are equal, round, and reactive to light. Neck: Normal range of motion. Neck supple. No hepatojugular reflux and no JVD present. Carotid bruit is not present. Cardiovascular: Normal rate, regular rhythm, normal heart sounds and intact distal pulses. Exam reveals no gallop and no friction rub. No murmur heard. Pulmonary/Chest: Effort normal and breath sounds normal. No respiratory distress. No wheezes. No rales. Abdominal: Soft. Bowel sounds are normal. No distension and no mass. No tenderness. No rebound and no guarding. Musculoskeletal: Normal range of motion. No edema and no tenderness. Neurological: Alert and oriented to person, place, and time. Skin: Skin is warm and dry. No rash noted. Not diaphoretic. No erythema. Psychiatric: anxious    EKG:  normal EKG, normal sinus rhythm, RSR'.     ASSESSMENT AND PLAN:  Patient Active Problem List   Diagnosis    Anxiety state    Elevated liver enzymes    Chest pain    History of alcohol use disorder    Alcohol related seizure ([address]     Multiple somatic complaints  Normal CV exam, normal CXR cardiac silhouette, normal EKG  Alcohol abuse  ETT-R normal  Needs PCP or psychiatric evaluation        Mukesh Robb M.D  [facility]

## 2022-04-28 ENCOUNTER — HOSPITAL ENCOUNTER (EMERGENCY)
Age: 32
Discharge: HOME | End: 2022-04-28
Attending: EMERGENCY MEDICINE
Payer: MEDICARE

## 2022-04-28 ENCOUNTER — APPOINTMENT (OUTPATIENT)
Dept: GENERAL RADIOLOGY | Age: 32
End: 2022-04-28
Payer: MEDICARE

## 2022-04-28 ENCOUNTER — PREP FOR PROCEDURE (OUTPATIENT)
Dept: GASTROENTEROLOGY | Age: 32
End: 2022-04-28

## 2022-04-28 ENCOUNTER — OFFICE VISIT (OUTPATIENT)
Dept: FAMILY MEDICINE CLINIC | Age: 32
End: 2022-04-28
Payer: MEDICARE

## 2022-04-28 ENCOUNTER — APPOINTMENT (OUTPATIENT)
Dept: CT IMAGING | Age: 32
End: 2022-04-28
Payer: MEDICARE

## 2022-04-28 VITALS
OXYGEN SATURATION: 99 % | HEIGHT: 65 IN | RESPIRATION RATE: 16 BRPM | BODY MASS INDEX: 24.99 KG/M2 | TEMPERATURE: 97.2 F | DIASTOLIC BLOOD PRESSURE: 86 MMHG | SYSTOLIC BLOOD PRESSURE: 152 MMHG | HEART RATE: 98 BPM | WEIGHT: 150 LBS

## 2022-04-28 VITALS
SYSTOLIC BLOOD PRESSURE: 140 MMHG | TEMPERATURE: 99.2 F | WEIGHT: 150 LBS | DIASTOLIC BLOOD PRESSURE: 78 MMHG | BODY MASS INDEX: 24.99 KG/M2 | HEIGHT: 65 IN | HEART RATE: 72 BPM | OXYGEN SATURATION: 98 %

## 2022-04-28 DIAGNOSIS — R07.9 CHEST PAIN, UNSPECIFIED TYPE: Primary | ICD-10-CM

## 2022-04-28 DIAGNOSIS — R06.02 SHORTNESS OF BREATH: Primary | ICD-10-CM

## 2022-04-28 DIAGNOSIS — R07.9 CHEST PAIN, UNSPECIFIED TYPE: ICD-10-CM

## 2022-04-28 DIAGNOSIS — R06.00 DYSPNEA, UNSPECIFIED TYPE: ICD-10-CM

## 2022-04-28 LAB
ALBUMIN SERPL-MCNC: 4.8 G/DL (ref 3.5–5.2)
ALP BLD-CCNC: 104 U/L (ref 40–129)
ALT SERPL-CCNC: 52 U/L (ref 0–40)
ANION GAP SERPL CALCULATED.3IONS-SCNC: 10 MMOL/L (ref 7–16)
AST SERPL-CCNC: 32 U/L (ref 0–39)
BASOPHILS ABSOLUTE: 0.02 E9/L (ref 0–0.2)
BASOPHILS RELATIVE PERCENT: 0.3 % (ref 0–2)
BILIRUB SERPL-MCNC: 0.4 MG/DL (ref 0–1.2)
BUN BLDV-MCNC: 6 MG/DL (ref 6–20)
CALCIUM SERPL-MCNC: 9.9 MG/DL (ref 8.6–10.2)
CHLORIDE BLD-SCNC: 102 MMOL/L (ref 98–107)
CO2: 26 MMOL/L (ref 22–29)
CREAT SERPL-MCNC: 0.7 MG/DL (ref 0.7–1.2)
D DIMER: <200 NG/ML DDU
EOSINOPHILS ABSOLUTE: 0.08 E9/L (ref 0.05–0.5)
EOSINOPHILS RELATIVE PERCENT: 1.3 % (ref 0–6)
GFR AFRICAN AMERICAN: >60
GFR NON-AFRICAN AMERICAN: >60 ML/MIN/1.73
GLUCOSE BLD-MCNC: 104 MG/DL (ref 74–99)
HCT VFR BLD CALC: 38.9 % (ref 37–54)
HEMOGLOBIN: 13.5 G/DL (ref 12.5–16.5)
IMMATURE GRANULOCYTES #: 0.01 E9/L
IMMATURE GRANULOCYTES %: 0.2 % (ref 0–5)
LYMPHOCYTES ABSOLUTE: 2.33 E9/L (ref 1.5–4)
LYMPHOCYTES RELATIVE PERCENT: 37.8 % (ref 20–42)
MAGNESIUM: 1.9 MG/DL (ref 1.6–2.6)
MCH RBC QN AUTO: 30.8 PG (ref 26–35)
MCHC RBC AUTO-ENTMCNC: 34.7 % (ref 32–34.5)
MCV RBC AUTO: 88.8 FL (ref 80–99.9)
MONOCYTES ABSOLUTE: 0.46 E9/L (ref 0.1–0.95)
MONOCYTES RELATIVE PERCENT: 7.5 % (ref 2–12)
NEUTROPHILS ABSOLUTE: 3.27 E9/L (ref 1.8–7.3)
NEUTROPHILS RELATIVE PERCENT: 52.9 % (ref 43–80)
PDW BLD-RTO: 12.7 FL (ref 11.5–15)
PLATELET # BLD: 180 E9/L (ref 130–450)
PMV BLD AUTO: 9.9 FL (ref 7–12)
POTASSIUM SERPL-SCNC: 3.9 MMOL/L (ref 3.5–5)
PRO-BNP: 34 PG/ML (ref 0–125)
RBC # BLD: 4.38 E12/L (ref 3.8–5.8)
SODIUM BLD-SCNC: 138 MMOL/L (ref 132–146)
TOTAL PROTEIN: 7.6 G/DL (ref 6.4–8.3)
TROPONIN, HIGH SENSITIVITY: <6 NG/L (ref 0–11)
WBC # BLD: 6.2 E9/L (ref 4.5–11.5)

## 2022-04-28 PROCEDURE — 85025 COMPLETE CBC W/AUTO DIFF WBC: CPT

## 2022-04-28 PROCEDURE — 71275 CT ANGIOGRAPHY CHEST: CPT

## 2022-04-28 PROCEDURE — G8420 CALC BMI NORM PARAMETERS: HCPCS | Performed by: PHYSICIAN ASSISTANT

## 2022-04-28 PROCEDURE — 6360000004 HC RX CONTRAST MEDICATION: Performed by: RADIOLOGY

## 2022-04-28 PROCEDURE — 99214 OFFICE O/P EST MOD 30 MIN: CPT | Performed by: PHYSICIAN ASSISTANT

## 2022-04-28 PROCEDURE — 99285 EMERGENCY DEPT VISIT HI MDM: CPT

## 2022-04-28 PROCEDURE — 84484 ASSAY OF TROPONIN QUANT: CPT

## 2022-04-28 PROCEDURE — 85378 FIBRIN DEGRADE SEMIQUANT: CPT

## 2022-04-28 PROCEDURE — 93005 ELECTROCARDIOGRAM TRACING: CPT | Performed by: PHYSICIAN ASSISTANT

## 2022-04-28 PROCEDURE — 83735 ASSAY OF MAGNESIUM: CPT

## 2022-04-28 PROCEDURE — 4004F PT TOBACCO SCREEN RCVD TLK: CPT | Performed by: PHYSICIAN ASSISTANT

## 2022-04-28 PROCEDURE — 71046 X-RAY EXAM CHEST 2 VIEWS: CPT

## 2022-04-28 PROCEDURE — 80053 COMPREHEN METABOLIC PANEL: CPT

## 2022-04-28 PROCEDURE — 83880 ASSAY OF NATRIURETIC PEPTIDE: CPT

## 2022-04-28 PROCEDURE — 93000 ELECTROCARDIOGRAM COMPLETE: CPT | Performed by: PHYSICIAN ASSISTANT

## 2022-04-28 PROCEDURE — G8427 DOCREV CUR MEDS BY ELIG CLIN: HCPCS | Performed by: PHYSICIAN ASSISTANT

## 2022-04-28 RX ORDER — LORATADINE 10 MG/1
CAPSULE, LIQUID FILLED ORAL
COMMUNITY
Start: 2021-12-20 | End: 2022-04-28 | Stop reason: ALTCHOICE

## 2022-04-28 RX ORDER — TOPIRAMATE 25 MG/1
25 TABLET ORAL PRN
COMMUNITY
Start: 2021-12-20 | End: 2022-04-28 | Stop reason: ALTCHOICE

## 2022-04-28 RX ORDER — TADALAFIL 20 MG/1
TABLET ORAL
COMMUNITY
Start: 2021-11-12 | End: 2022-04-28 | Stop reason: ALTCHOICE

## 2022-04-28 RX ORDER — SODIUM CHLORIDE 0.9 % (FLUSH) 0.9 %
5-40 SYRINGE (ML) INJECTION PRN
Status: CANCELLED | OUTPATIENT
Start: 2022-04-28

## 2022-04-28 RX ORDER — CHLORHEXIDINE GLUCONATE 0.12 MG/ML
RINSE ORAL
COMMUNITY
Start: 2022-02-28 | End: 2022-04-28 | Stop reason: ALTCHOICE

## 2022-04-28 RX ORDER — SODIUM CHLORIDE 9 MG/ML
INJECTION, SOLUTION INTRAVENOUS PRN
Status: CANCELLED | OUTPATIENT
Start: 2022-04-28

## 2022-04-28 RX ORDER — SODIUM CHLORIDE 9 MG/ML
INJECTION, SOLUTION INTRAVENOUS CONTINUOUS
Status: CANCELLED | OUTPATIENT
Start: 2022-04-28

## 2022-04-28 RX ORDER — SODIUM CHLORIDE 0.9 % (FLUSH) 0.9 %
5-40 SYRINGE (ML) INJECTION EVERY 12 HOURS SCHEDULED
Status: CANCELLED | OUTPATIENT
Start: 2022-04-28

## 2022-04-28 RX ADMIN — IOPAMIDOL 75 ML: 755 INJECTION, SOLUTION INTRAVENOUS at 18:15

## 2022-04-28 NOTE — ED NOTES
Department of Emergency Medicine  FIRST PROVIDER TRIAGE NOTE             Independent MLP           4/28/22  3:18 PM EDT    Date of Encounter: 4/28/22   MRN: [id number]      HPI: Susan Conteh is a 28 y.o. male who presents to the ED for Shortness of Breath, Chest Pain, and Jaw Pain     Patient is a 23-year-old stating that he is having right-sided chest pain that when he takes a deep breath and radiates to his back this is been going on for months. Patient no longer is a drinker but states he smokes seldomly. Patient states he has chest pain and jaw pain. Patient denies any recent travel or recent surgeries. Patient states a few weeks ago he was running on the treadmill and felt a burning in the chest with radiation into his left side which radiated up into his neck. Patient states is much worse with exertional activity    ROS: Negative for abd pain, fever, cough, vomiting or diarrhea. PE: Gen Appearance/Constitutional: alert  HEENT: NC/NT. PERRLA,  Airway patent. Neck: supple     Initial Plan of Care: All treatment areas with department are currently occupied. Plan to order/Initiate the following while awaiting opening in ED: labs, EKG and imaging studies.   Initiate Treatment-Testing, Proceed toTreatment Area When Bed Available for ED Attending/MLP to Continue Care    Electronically signed by Ashley Wells PA-C   DD: 4/28/22         Ashley Wells PA-C  04/28/22 1727

## 2022-04-28 NOTE — PROGRESS NOTES
22  Johana Villatoro : 1990 Sex: male  Age 28 y.o. Subjective:  Chief Complaint   Patient presents with    Shortness of Breath     chest burning and heaviness. Started 1 year ago.  Neck Pain    Nausea         HPI:   Johana Villatoro , 28 y.o. male presents to express care for evaluation of shortness of breath, chest pain, heaviness, neck pain, nausea    HPI  23-year-old male presents to express care for evaluation of chest pain, shortness of breath, neck pain, nausea. The patient has had the symptoms on and off for about a year. The patient has had stress tests that is been normal.  The patient is followed up with cardiology. The patient had work-ups for headache which included an MRI that was negative. The patient had an ultrasound of the left lower extremity that did not show any evidence of a DVT. The patient states that he is having some burning throughout his chest.  Was worse when he was on the treadmill and also when he was golfing. Also caused him to be increasingly fatigued while golfing and ended up having to come home and sleep for about 4 to 5 hours. The patient is inquiring about getting a CT done of the chest, D-dimer, and a proBNP.      ROS:   Unless otherwise stated in this report the patient's positive and negative responses for review of systems for constitutional, eyes, ENT, cardiovascular, respiratory, gastrointestinal, neurological, , musculoskeletal, and integument systems and related systems to the presenting problem are either stated in the history of present illness or were not pertinent or were negative for the symptoms and/or complaints related to the presenting medical problem. Positives and pertinent negatives as per HPI. All others reviewed and are negative.       PMH:     Past Medical History:   Diagnosis Date    Acid reflux     For EGD 5-3-22    Alcohol related seizure [address] [address] 2021    Cephalgia 2021    Erectile dysfunction     History of alcohol use disorder 12/20/2021    Nausea        Past Surgical History:   Procedure Laterality Date    CYSTOSCOPY  2008       Family History   Problem Relation Age of Onset    High Cholesterol Father        Medications:   No current outpatient medications on file. Allergies: Allergies   Allergen Reactions    Cefazolin Rash    Cefprozil Rash       Social History:     Social History     Tobacco Use    Smoking status: Current Every Day Smoker     Packs/day: 0.25     Types: Cigarettes    Smokeless tobacco: Never Used   Vaping Use    Vaping Use: Some days    Substances: Nicotine, Flavoring    Passive vaping exposure: Yes   Substance Use Topics    Alcohol use: Not Currently     Alcohol/week: 15.0 standard drinks     Types: 15 Cans of beer per week     Comment: stopped 1/16/22    Drug use: Not Currently     Frequency: 2.0 times per week     Types: Cocaine     Comment: states he quit cocaine last 4/2021       Patient lives at home. Physical Exam:     Vitals:    04/28/22 1239   BP: (!) 140/78   Pulse: 72   Temp: 99.2 °F (37.3 °C)   SpO2: 98%   Weight: 150 lb (68 kg)   Height: 5' 5\" (1.651 m)       Exam:  Physical Exam  Nurses note and vital signs reviewed and patient is not hypoxic. General: The patient appears well and in no apparent distress. Patient is resting comfortably on cart. Skin: Warm, dry, no pallor noted. There is no rash noted. Head: Normocephalic, atraumatic. Eye: Normal conjunctiva  Ears, Nose, Mouth, and Throat: Oral mucosa is moist  Neck: There is no significant lymphadenopathy noted, no swelling, no masses  Cardiovascular: Regular Rate and Rhythm  Respiratory: Patient is in no distress, no accessory muscle use, lungs are clear to auscultation, no wheezing, crackles or rhonchi  Back: Non-tender, no CVA tenderness bilaterally to percussion. GI: Normal bowel sounds, no tenderness to palpation, no masses appreciated.  No rebound, guarding, or rigidity noted.  Musculoskeletal: The patient has no evidence of calf tenderness, no pitting edema, symmetrical pulses noted bilaterally  Neurological: A&O x4, normal speech      Testing:           Medical Decision Making:     Vital signs reviewed    Past medical history reviewed. Allergies reviewed. Medications reviewed. Patient on arrival does not appear to be in any apparent distress or discomfort. The patient has been seen and evaluated. The patient does not appear to be toxic or lethargic. The patient had EKG obtained that showed normal sinus rhythm, rate of 76, normal axis, no ST segment elevation, no depression, no ectopy, normal intervals. The patient wanted more extensive work-up. We discussed the limitations of express care and we will send the patient to the emergency department. Clinical Impression:   Santino Fernández was seen today for shortness of breath, neck pain and nausea. Diagnoses and all orders for this visit:    Chest pain, unspecified type  -     EKG 12 lead; Future  -     EKG 12 lead    Dyspnea, unspecified type        go directly to the emergency department.      SIGNATURE: Nanette Laws III, PA-C

## 2022-04-28 NOTE — H&P (VIEW-ONLY)
PATIENT NAME: Balbina Eastman NUMBER: [de-identified]    YOB: 1990    DATE: 04/07/2022    REFERRING PHYSICIAN:  Meghan Starks D.O.    PROBLEM:  Abdominal pain, epigastric. SUBJECTIVE:  This is a young man, currently 28years of age seen here for the first time. His complaint is upper abdominal discomfort, bloating, satiety. Postprandial abdominal pain, which is aggravated by the ingestion of food and  improves fasting, though he does not really complain of heartburn or does not have a history of ulcer disease. He was evaluated through the office of Dr. Marjie Cushing in 2021 for abnormal liver function studies. At that time, serological evaluation included hepatitis B and C studies, autoimmune studies, iron studies, alpha-1 antitrypsin, and ceruloplasmin and these were negative. His laboratory work, however, was significantly abnormal at the time with an ALT of 282 and AST of 460, bilirubin is 0.5. In June the same year, his ALT was 308 and his AST was 652. In December the same year, _____dramatically improved with an ALT of 44 and AST of 29 and a bilirubin of 0.6. More recently in March of this year, his alkaline phosphatase 98, ALT 66, AST 36, and bilirubin 1.3. There has been no imaging locally. The upper gastrointestinal tract was apparently to be evaluated endoscopically, but that did not occur. He has a history of heavy drinking beginning at the age of 26 and drank daily for five years. He was aggravated during the [place] pandemic. He apparently had undergone alcohol detoxification on several occasions, but since January, he has been abstinent. He has complained of muscle and joint pain, and fatigue, which is persistent but decreased since the termination of alcohol. He has been seen by ENT for issues of sinusitis. He has been treated by his dentist with amoxicillin for period of about 2-1/2 weeks. He thought he felt worse.   He has had a stress test through the office of Dr. Fletcher Garg, which was negative with a low suspicion of disease. He does suggest while in [place] specifically, [facility] in [place] that he had a CT scan of the abdomen performed. He apparently was not admitted at that time. PAST MEDICAL/SURGICAL HISTORY:  Essentially listed above. No surgical procedures. MEDICATIONS:  Cialis, fluticasone, and Flonase. ALLERGIES:  CEPHALOSPORIN. SOCIAL HISTORY:  Single. Five cigarettes a day. No current alcohol. Two siblings. OBJECTIVE:  Weight is 153 pounds. Height 65 inches. Blood pressure is 144/90. Lacks pallor and scleral icterus. No neck vein distention or adenopathy. Lungs are clear. Heart tones are normal.  Regular rate. Regular rhythm. No audible murmur. No gallop. Soft abdomen. Nontender. No organomegaly, ascites, or dependent edema. ASSESSMENT:  Dyspeptic symptoms. History of heavy alcohol, but abstinent for at least three months. His previously abnormal liver function studies entirely consistent with alcohol, though the AST of 652 is a bit unusual.  Imaging may have been successfully done relatively recently in [place] and will seek that out. In the meantime, the upper gastrointestinal tract will be endoscopically assessed.     PLAN:  Esophagogastroduodenoscopy tentatively scheduled at [address] [facility] in May

## 2022-04-28 NOTE — PROGRESS NOTES
[facility] PRE-ADMISSION TESTING INSTRUCTIONS    The Preadmission Testing patient is instructed accordingly using the following criteria (check applicable):    ARRIVAL INSTRUCTIONS:  [x] Parking the day of Surgery is located in the Main Entrance lot. Upon entering the door, make an immediate right to the surgery reception desk    [x] Bring photo ID and insurance card    [] Bring in a copy of Living will or Durable Power of  papers. [x] Please be sure to arrange for responsible adult to provide transportation to and from the hospital    [x] Please arrange for responsible adult to be with you for the 24 hour period post procedure due to having anesthesia      GENERAL INSTRUCTIONS:    [x] Nothing by mouth after midnight, including gum, candy, mints or water    [x] You may brush your teeth, but do not swallow any water    [] Take medications as instructed with 1-2 oz of water    [x] Stop herbal supplements and vitamins 5 days prior to procedure    [] Follow preop dosing of blood thinners per physician instructions    [] Take 1/2 dose of evening insulin, but no insulin after midnight    [] No oral diabetic medications after midnight    [] If diabetic and have low blood sugar or feel symptomatic, take 1-2oz apple juice only    [] Bring inhalers day of surgery    [] Bring C-PAP/ Bi-Pap day of surgery    [] Bring urine specimen day of surgery    [x] Shower or bath with soap, lather and rinse well, AM of Surgery, no lotion, powders or creams to surgical site    [] Follow bowel prep as instructed per surgeon    [x] No tobacco products within 24 hours of surgery     [x] No alcohol or illegal drug use within 24 hours of surgery.     [x] Jewelry, body piercing's, eyeglasses, contact lenses and dentures are not permitted into surgery (bring cases)      [] Please do not wear any nail polish, make up or hair products on the day of surgery    [x] You can expect a call the business day prior to procedure to notify you if your arrival time changes    [x] If you receive a survey after surgery we would greatly appreciate your comments    [] Parent/guardian of a minor must accompany their child and remain on the premises  the entire time they are under our care     [] Pediatric patients may bring favorite toy, blanket or comfort item with them    [] A caregiver or family member must remain with the patient during their stay if they are mentally handicapped, have dementia, disoriented or unable to use a call light or would be a safety concern if left unattended    [x] Please notify surgeon if you develop any illness between now and time of surgery (cold, cough, sore throat, fever, nausea, vomiting) or any signs of infections  including skin, wounds, and dental.    [x]  The Outpatient Pharmacy is available to fill your prescription here on your day of surgery, ask your preop nurse for details    [] Other instructions    EDUCATIONAL MATERIALS PROVIDED:    [] PAT Preoperative Education Packet/Booklet     [] Medication List    [] Transfusion bracelet applied with instructions    [] Shower with soap, lather and rinse well, and use CHG wipes provided the evening before surgery as instructed    [] Incentive spirometer with instructions        Have you been tested for COVID  No           Have you been told you were positive for COVID No  Have you had any known exposure to someone that is positive for COVID No  Do you have a cough                   No              Do you have shortness of breath No                 Do you have a sore throat            No                Are you having chills                    No                Are you having muscle aches. No                    Please come to the hospital wearing a mask and have your significant other wear a mask as well. Both of you should check your temperature before leaving to come here,  if it is 100 or higher please call [phone] for instruction.

## 2022-04-29 LAB
EKG ATRIAL RATE: 72 BPM
EKG P AXIS: 60 DEGREES
EKG P-R INTERVAL: 156 MS
EKG Q-T INTERVAL: 376 MS
EKG QRS DURATION: 98 MS
EKG QTC CALCULATION (BAZETT): 411 MS
EKG R AXIS: 44 DEGREES
EKG T AXIS: 35 DEGREES
EKG VENTRICULAR RATE: 72 BPM

## 2022-04-29 PROCEDURE — 93010 ELECTROCARDIOGRAM REPORT: CPT | Performed by: INTERNAL MEDICINE

## 2022-04-29 ASSESSMENT — ENCOUNTER SYMPTOMS
VOICE CHANGE: 0
NAUSEA: 0
DIARRHEA: 0
VOMITING: 0
PHOTOPHOBIA: 0
RHINORRHEA: 0
SHORTNESS OF BREATH: 1
ABDOMINAL PAIN: 0
COUGH: 0
TROUBLE SWALLOWING: 0

## 2022-04-29 NOTE — ED PROVIDER NOTES
HPI   Patient is a 26-year-old male with past medical history of anxiety and alcohol use disorder presenting the emergency department due to worsening acute on chronic chest pain and shortness breath. Patient states that he has had symptoms for over a year. He has been worked up as an outpatient with negative results however he reports that his symptoms were severe and worse today. Patient endorses vague right-sided chest pain that is intermittent and sharp with occasional radiation to his back, neck and jaw. Nothing in particular makes the pain better or worse. Pain is moderate in severity. Today, patient states that he had severe associated shortness of breath and had difficulty catching his breath. Apparently, patient has had worse shortness of breath over the past few weeks. His symptoms seem to be exacerbated by exertion. Patient states that he generally has these symptoms when he is running on the treadmill. He denies past cardiac history. Patient also has no fever, chills, nausea, vomiting, diaphoresis, palpitations, abdominal pain, lightheadedness, syncope, urinary symptoms, constipation or diarrhea. Patient also denying appeared factors including hx of VTE, recent surgery/immobilization, leg swelling, hemoptysis, syncope, or history of malignancy. Review of Systems   Constitutional: Negative for chills, fatigue and fever. HENT: Negative for congestion, rhinorrhea, trouble swallowing and voice change. Eyes: Negative for photophobia and visual disturbance. Respiratory: Positive for shortness of breath. Negative for cough. Cardiovascular: Positive for chest pain. Negative for palpitations. Gastrointestinal: Negative for abdominal pain, diarrhea, nausea and vomiting. Genitourinary: Negative for dysuria, frequency, hematuria and urgency. Musculoskeletal: Negative for arthralgias and myalgias. Skin: Negative for rash and wound.    Neurological: Negative for dizziness, weakness and headaches. Psychiatric/Behavioral: Negative for behavioral problems and confusion. Physical Exam  Constitutional:       General: He is not in acute distress. Appearance: Normal appearance. He is not ill-appearing. HENT:      Head: Normocephalic and atraumatic. Mouth/Throat:      Mouth: Mucous membranes are moist.      Pharynx: Oropharynx is clear. Eyes:      Pupils: Pupils are equal, round, and reactive to light. Cardiovascular:      Rate and Rhythm: Normal rate and regular rhythm. Pulses: Normal pulses. Heart sounds: Normal heart sounds. Pulmonary:      Effort: Pulmonary effort is normal. No respiratory distress. Breath sounds: Normal breath sounds. No decreased breath sounds, wheezing or rales. Abdominal:      Palpations: Abdomen is soft. Tenderness: There is no abdominal tenderness. There is no guarding or rebound. Musculoskeletal:         General: Normal range of motion. Cervical back: Normal range of motion and neck supple. Right lower leg: No tenderness. Left lower leg: No tenderness. Skin:     General: Skin is warm and dry. Capillary Refill: Capillary refill takes less than 2 seconds. Neurological:      General: No focal deficit present. Mental Status: He is alert and oriented to person, place, and time. Cranial Nerves: No cranial nerve deficit. Coordination: Coordination normal.   Psychiatric:         Mood and Affect: Mood normal.         Behavior: Behavior normal.          Procedures   EKG: This EKG is signed and interpreted by me. Normal sinus rhythm with ventricular rate of 82 bpm.  Normal axis. No interval normal.  QTc not prolonged. No evidence of acute STEMI. No significant changes compared to previous EKG on 4/13/2022.     MDM   Patient is a 62-year-old male with past medical history of anxiety and alcohol use disorder presenting to the ED due to worsening acute on chronic chest pain and shortness of breath. Patient noting that his symptoms have been going on for approximately a year. He has had outpatient work-up completed which has been essentially negative. Patient states that his symptoms were worsening severe today which brought him into the ED for evaluation. On initial evaluation, patient appeared in no acute respiratory distress. He was hemodynamically stable in no acute distress. Or the emergency department unremarkable. Cardiac work-up negative. No clinically significant lab abnormalities. Trop and D-dimer negative. EKG sinus with no ischemic changes. Chest x-ray unremarkable. CTA obtained and negative for pulmonary embolism or acute cardiopulmonary process. Work-up results were discussed with the patient and he is reassured that his symptoms are likely not cardiac or pulmonary in etiology. He is advised to follow-up with his PCP and given contact information for pulmonology to see as an outpatient. Patient agreeable with discharge. He remained stable in the ED. ED Course as of 04/29/22 1145   Thu Apr 28, 2022   1849 Reevaluated. He states that he is feeling fine right now. Work-up and results were discussed with him and he is agreeable to discharge with outpatient follow-up as needed. Will provide number for pulmonology for follow-up if the patient wishes. [PP]      ED Course User Index  [PP] Torres Donovan, DO        --------------------------------------------- PAST HISTORY ---------------------------------------------  Past Medical History:  has a past medical history of Acid reflux, Alcohol related seizure ([address] Cephalgia, Erectile dysfunction, History of alcohol use disorder, and Nausea. Past Surgical History:  has a past surgical history that includes Cystoscopy (2008). Social History:  reports that he has been smoking cigarettes. He has been smoking about 0.25 packs per day.  He has never used smokeless tobacco. He reports previous alcohol use of about 15.0 standard drinks of alcohol per week. He reports previous drug use. Frequency: 2.00 times per week. Drug: Cocaine. Family History: family history includes High Cholesterol in his father. The patients home medications have been reviewed.     Allergies: Cefazolin and Cefprozil    -------------------------------------------------- RESULTS -------------------------------------------------  Labs:  Results for orders placed or performed during the hospital encounter of 04/28/22   CBC with Auto Differential   Result Value Ref Range    WBC 6.2 4.5 - 11.5 E9/L    RBC 4.38 3.80 - 5.80 E12/L    Hemoglobin 13.5 12.5 - 16.5 g/dL    Hematocrit 38.9 37.0 - 54.0 %    MCV 88.8 80.0 - 99.9 fL    MCH 30.8 26.0 - 35.0 pg    MCHC 34.7 (H) 32.0 - 34.5 %    RDW 12.7 11.5 - 15.0 fL    Platelets 875 170 - 647 E9/L    MPV 9.9 7.0 - 12.0 fL    Neutrophils % 52.9 43.0 - 80.0 %    Immature Granulocytes % 0.2 0.0 - 5.0 %    Lymphocytes % 37.8 20.0 - 42.0 %    Monocytes % 7.5 2.0 - 12.0 %    Eosinophils % 1.3 0.0 - 6.0 %    Basophils % 0.3 0.0 - 2.0 %    Neutrophils Absolute 3.27 1.80 - 7.30 E9/L    Immature Granulocytes # 0.01 E9/L    Lymphocytes Absolute 2.33 1.50 - 4.00 E9/L    Monocytes Absolute 0.46 0.10 - 0.95 E9/L    Eosinophils Absolute 0.08 0.05 - 0.50 E9/L    Basophils Absolute 0.02 0.00 - 0.20 E9/L   Comprehensive Metabolic Panel   Result Value Ref Range    Sodium 138 132 - 146 mmol/L    Potassium 3.9 3.5 - 5.0 mmol/L    Chloride 102 98 - 107 mmol/L    CO2 26 22 - 29 mmol/L    Anion Gap 10 7 - 16 mmol/L    Glucose 104 (H) 74 - 99 mg/dL    BUN 6 6 - 20 mg/dL    CREATININE 0.7 0.7 - 1.2 mg/dL    GFR Non-African American >60 >=60 mL/min/1.73    GFR African American >60     Calcium 9.9 8.6 - 10.2 mg/dL    Total Protein 7.6 6.4 - 8.3 g/dL    Albumin 4.8 3.5 - 5.2 g/dL    Total Bilirubin 0.4 0.0 - 1.2 mg/dL    Alkaline Phosphatase 104 40 - 129 U/L    ALT 52 (H) 0 - 40 U/L    AST 32 0 - 39 U/L   Troponin   Result Value Ref Range    Troponin, High Sensitivity <6 0 - 11 ng/L   D-Dimer, Quantitative   Result Value Ref Range    D-Dimer, Quant <200 ng/mL DDU   Brain Natriuretic Peptide   Result Value Ref Range    Pro-BNP 34 0 - 125 pg/mL   Magnesium   Result Value Ref Range    Magnesium 1.9 1.6 - 2.6 mg/dL   EKG 12 Lead   Result Value Ref Range    Ventricular Rate 72 BPM    Atrial Rate 72 BPM    P-R Interval 156 ms    QRS Duration 98 ms    Q-T Interval 376 ms    QTc Calculation (Bazett) 411 ms    P Axis 60 degrees    R Axis 44 degrees    T Axis 35 degrees       Radiology:  CTA PULMONARY W CONTRAST   Final Result   No evidence of pulmonary embolism or acute pulmonary abnormality. RECOMMENDATIONS:   Unavailable         XR CHEST (2 VW)   Final Result   No acute process. ------------------------- NURSING NOTES AND VITALS REVIEWED ---------------------------  Date / Time Roomed:  4/28/2022  4:24 PM  ED Bed Assignment:  [id number]    The nursing notes within the ED encounter and vital signs as below have been reviewed. BP (!) 152/86   Pulse 98   Temp 97.2 °F (36.2 °C) (Temporal)   Resp 16   Ht 5' 5\" (1.651 m)   Wt 150 lb (68 kg)   SpO2 99%   BMI 24.96 kg/m²   Oxygen Saturation Interpretation: Normal      ------------------------------------------ PROGRESS NOTES ------------------------------------------  I have spoken with the patient and discussed todays results, in addition to providing specific details for the plan of care and counseling regarding the diagnosis and prognosis. Their questions are answered at this time and they are agreeable with the plan. I discussed at length with them reasons for immediate return here for re evaluation. They will followup with primary care by calling their office tomorrow. --------------------------------- ADDITIONAL PROVIDER NOTES ---------------------------------  At this time the patient is without objective evidence of an acute process requiring hospitalization or inpatient management. They have remained hemodynamically stable throughout their entire ED visit and are stable for discharge with outpatient follow-up. The plan has been discussed in detail and they are aware of the specific conditions for emergent return, as well as the importance of follow-up. There are no discharge medications for this patient. Diagnosis:  1. Shortness of breath    2. Chest pain, unspecified type        Disposition:  Patient's disposition: Discharge to home  Patient's condition is stable.          Manjit Templeton DO  Resident  04/29/22 0452

## 2022-05-03 ENCOUNTER — ANESTHESIA EVENT (OUTPATIENT)
Dept: ENDOSCOPY | Age: 32
End: 2022-05-03
Payer: MEDICARE

## 2022-05-03 ENCOUNTER — HOSPITAL ENCOUNTER (OUTPATIENT)
Age: 32
Setting detail: OUTPATIENT SURGERY
Discharge: HOME | End: 2022-05-03
Attending: INTERNAL MEDICINE | Admitting: INTERNAL MEDICINE
Payer: MEDICARE

## 2022-05-03 ENCOUNTER — ANESTHESIA (OUTPATIENT)
Dept: ENDOSCOPY | Age: 32
End: 2022-05-03
Payer: MEDICARE

## 2022-05-03 VITALS
DIASTOLIC BLOOD PRESSURE: 71 MMHG | BODY MASS INDEX: 24.66 KG/M2 | OXYGEN SATURATION: 100 % | RESPIRATION RATE: 17 BRPM | SYSTOLIC BLOOD PRESSURE: 122 MMHG | WEIGHT: 148 LBS | HEIGHT: 65 IN | TEMPERATURE: 97.5 F | HEART RATE: 66 BPM

## 2022-05-03 VITALS
RESPIRATION RATE: 20 BRPM | DIASTOLIC BLOOD PRESSURE: 65 MMHG | SYSTOLIC BLOOD PRESSURE: 133 MMHG | OXYGEN SATURATION: 99 %

## 2022-05-03 LAB
AMPHETAMINE SCREEN, URINE: NOT DETECTED
BARBITURATE SCREEN URINE: NOT DETECTED
BENZODIAZEPINE SCREEN, URINE: NOT DETECTED
CANNABINOID SCREEN URINE: NOT DETECTED
COCAINE METABOLITE SCREEN URINE: NOT DETECTED
FENTANYL SCREEN, URINE: NOT DETECTED
Lab: NORMAL
METHADONE SCREEN, URINE: NOT DETECTED
OPIATE SCREEN URINE: NOT DETECTED
OXYCODONE URINE: NOT DETECTED
PHENCYCLIDINE SCREEN URINE: NOT DETECTED

## 2022-05-03 PROCEDURE — 3700000000 HC ANESTHESIA ATTENDED CARE: Performed by: INTERNAL MEDICINE

## 2022-05-03 PROCEDURE — 80307 DRUG TEST PRSMV CHEM ANLYZR: CPT

## 2022-05-03 PROCEDURE — 6360000002 HC RX W HCPCS

## 2022-05-03 PROCEDURE — 2709999900 HC NON-CHARGEABLE SUPPLY: Performed by: INTERNAL MEDICINE

## 2022-05-03 PROCEDURE — 7100000011 HC PHASE II RECOVERY - ADDTL 15 MIN: Performed by: INTERNAL MEDICINE

## 2022-05-03 PROCEDURE — 3700000001 HC ADD 15 MINUTES (ANESTHESIA): Performed by: INTERNAL MEDICINE

## 2022-05-03 PROCEDURE — 7100000010 HC PHASE II RECOVERY - FIRST 15 MIN: Performed by: INTERNAL MEDICINE

## 2022-05-03 PROCEDURE — 2580000003 HC RX 258: Performed by: INTERNAL MEDICINE

## 2022-05-03 PROCEDURE — 3609017100 HC EGD: Performed by: INTERNAL MEDICINE

## 2022-05-03 PROCEDURE — 2500000003 HC RX 250 WO HCPCS

## 2022-05-03 RX ORDER — SODIUM CHLORIDE 0.9 % (FLUSH) 0.9 %
5-40 SYRINGE (ML) INJECTION PRN
Status: DISCONTINUED | OUTPATIENT
Start: 2022-05-03 | End: 2022-05-03 | Stop reason: HOSPADM

## 2022-05-03 RX ORDER — PROPOFOL 10 MG/ML
INJECTION, EMULSION INTRAVENOUS PRN
Status: DISCONTINUED | OUTPATIENT
Start: 2022-05-03 | End: 2022-05-03 | Stop reason: SDUPTHER

## 2022-05-03 RX ORDER — MIDAZOLAM HYDROCHLORIDE 1 MG/ML
INJECTION INTRAMUSCULAR; INTRAVENOUS PRN
Status: DISCONTINUED | OUTPATIENT
Start: 2022-05-03 | End: 2022-05-03

## 2022-05-03 RX ORDER — LIDOCAINE HYDROCHLORIDE 20 MG/ML
INJECTION, SOLUTION EPIDURAL; INFILTRATION; INTRACAUDAL; PERINEURAL PRN
Status: DISCONTINUED | OUTPATIENT
Start: 2022-05-03 | End: 2022-05-03 | Stop reason: SDUPTHER

## 2022-05-03 RX ORDER — SODIUM CHLORIDE 9 MG/ML
INJECTION, SOLUTION INTRAVENOUS CONTINUOUS
Status: DISCONTINUED | OUTPATIENT
Start: 2022-05-03 | End: 2022-05-03 | Stop reason: HOSPADM

## 2022-05-03 RX ORDER — MIDAZOLAM HYDROCHLORIDE 1 MG/ML
INJECTION INTRAMUSCULAR; INTRAVENOUS PRN
Status: DISCONTINUED | OUTPATIENT
Start: 2022-05-03 | End: 2022-05-03 | Stop reason: SDUPTHER

## 2022-05-03 RX ORDER — SODIUM CHLORIDE 0.9 % (FLUSH) 0.9 %
5-40 SYRINGE (ML) INJECTION EVERY 12 HOURS SCHEDULED
Status: DISCONTINUED | OUTPATIENT
Start: 2022-05-03 | End: 2022-05-03 | Stop reason: HOSPADM

## 2022-05-03 RX ORDER — SODIUM CHLORIDE 9 MG/ML
INJECTION, SOLUTION INTRAVENOUS PRN
Status: DISCONTINUED | OUTPATIENT
Start: 2022-05-03 | End: 2022-05-03 | Stop reason: HOSPADM

## 2022-05-03 RX ADMIN — LIDOCAINE HYDROCHLORIDE 80 MG: 20 INJECTION, SOLUTION EPIDURAL; INFILTRATION; INTRACAUDAL; PERINEURAL at 11:02

## 2022-05-03 RX ADMIN — MIDAZOLAM 2 MG: 1 INJECTION INTRAMUSCULAR; INTRAVENOUS at 11:02

## 2022-05-03 RX ADMIN — SODIUM CHLORIDE: 9 INJECTION, SOLUTION INTRAVENOUS at 11:02

## 2022-05-03 RX ADMIN — PROPOFOL 250 MG: 10 INJECTION, EMULSION INTRAVENOUS at 11:04

## 2022-05-03 ASSESSMENT — LIFESTYLE VARIABLES: SMOKING_STATUS: 1

## 2022-05-03 NOTE — ANESTHESIA PRE PROCEDURE
Department of Anesthesiology  Preprocedure Note       Name:  French Maldonado   Age:  28 y.o.  :  1990                                          MRN:  [id number]         Date:  5/3/2022      Surgeon: Marquez Tucekr):  Iona Szymanski MD    Procedure: Procedure(s):  EGD ESOPHAGOGASTRODUODENOSCOPY    Medications prior to admission:   Prior to Admission medications    Not on File       Current medications:    Current Facility-Administered Medications   Medication Dose Route Frequency Provider Last Rate Last Admin    0.9 % sodium chloride infusion   IntraVENous Continuous Iona Szymanski MD        0.9 % sodium chloride infusion   IntraVENous PRN Iona Szymanski MD        sodium chloride flush 0.9 % injection 5-40 mL  5-40 mL IntraVENous 2 times per day Iona Szymanski MD        sodium chloride flush 0.9 % injection 5-40 mL  5-40 mL IntraVENous PRN Iona Szymanski MD           Allergies:     Allergies   Allergen Reactions    Cefazolin Rash    Cefprozil Rash       Problem List:    Patient Active Problem List   Diagnosis Code    Anxiety state F41.1    Elevated liver enzymes R74.8    Chest pain R07.9    History of alcohol use disorder Z87.898    Alcohol related seizure ([address] R56.9       Past Medical History:        Diagnosis Date    Acid reflux     For EGD 5-3-22    Alcohol related seizure ([address] 2021    Cephalgia 2021    Erectile dysfunction     History of alcohol use disorder 2021    Nausea        Past Surgical History:        Procedure Laterality Date    CYSTOSCOPY         Social History:    Social History     Tobacco Use    Smoking status: Current Every Day Smoker     Packs/day: 0.25     Types: Cigarettes    Smokeless tobacco: Never Used   Substance Use Topics    Alcohol use: Not Currently     Alcohol/week: 15.0 standard drinks     Types: 15 Cans of beer per week     Comment: stopped 22                                Ready to quit: Not Answered  Counseling given: Not Answered      Vital Signs (Current):   Vitals:    04/28/22 0939 05/03/22 0913   BP:  (!) 118/59   Pulse:  59   Resp:  18   Temp:  97.2 °F (36.2 °C)   SpO2:  100%   Weight: 148 lb (67.1 kg)    Height: 5' 5\" (1.651 m)                                               BP Readings from Last 3 Encounters:   05/03/22 (!) 118/59   04/28/22 (!) 152/86   04/28/22 (!) 140/78       NPO Status: Time of last liquid consumption: 0000                        Time of last solid consumption: 0000                        Date of last liquid consumption: 05/02/22                        Date of last solid food consumption: 05/02/22    BMI:   Wt Readings from Last 3 Encounters:   04/28/22 148 lb (67.1 kg)   04/28/22 150 lb (68 kg)   04/28/22 150 lb (68 kg)     Body mass index is 24.63 kg/m². CBC:   Lab Results   Component Value Date    WBC 6.2 04/28/2022    RBC 4.38 04/28/2022    HGB 13.5 04/28/2022    HCT 38.9 04/28/2022    MCV 88.8 04/28/2022    RDW 12.7 04/28/2022     04/28/2022       CMP:   Lab Results   Component Value Date     04/28/2022    K 3.9 04/28/2022    K 4.2 12/01/2021     04/28/2022    CO2 26 04/28/2022    BUN 6 04/28/2022    CREATININE 0.7 04/28/2022    GFRAA >60 04/28/2022    LABGLOM >60 04/28/2022    GLUCOSE 104 04/28/2022    PROT 7.6 04/28/2022    CALCIUM 9.9 04/28/2022    BILITOT 0.4 04/28/2022    ALKPHOS 104 04/28/2022    AST 32 04/28/2022    ALT 52 04/28/2022       POC Tests: No results for input(s): POCGLU, POCNA, POCK, POCCL, POCBUN, POCHEMO, POCHCT in the last 72 hours.     Coags: No results found for: PROTIME, INR, APTT    HCG (If Applicable): No results found for: PREGTESTUR, PREGSERUM, HCG, HCGQUANT     ABGs: No results found for: PHART, PO2ART, HHY2PTD, BWC1AMX, BEART, L5EJOQRW     Type & Screen (If Applicable):  No results found for: LABABO, LABRH    Drug/Infectious Status (If Applicable):  No results found for: HIV, HEPCAB    COVID-19 Screening (If Applicable): No results found for: COVID19        Anesthesia Evaluation  Patient summary reviewed and Nursing notes reviewed no history of anesthetic complications:   Airway: Mallampati: II  TM distance: >3 FB   Neck ROM: full  Mouth opening: > = 3 FB Dental: normal exam         Pulmonary:normal exam    (+) current smoker          Patient smoked on day of surgery. ROS comment: vapes   Cardiovascular:  Exercise tolerance: good (>4 METS),                     Neuro/Psych:   (+) headaches:,             GI/Hepatic/Renal:   (+) GERD:,          ROS comment: Former alcohol abuse - per patient, no alcohol for 3 1/2 months. Endo/Other: Negative Endo/Other ROS                    Abdominal:             Vascular: negative vascular ROS. Other Findings:             Anesthesia Plan      MAC     ASA 2       Induction: intravenous. Anesthetic plan and risks discussed with patient and mother. Plan discussed with CRNA.                   Jacky Youssef MD   5/3/2022

## 2022-05-03 NOTE — BRIEF OP NOTE
Brief Postoperative Note      Patient: Akosua Fagan  YOB: 1990  MRN: [id number]    Date of Procedure: 5/3/2022    Pre-Op Diagnosis: EPIGASTRIC PAIN    Post-Op Diagnosis: normal       Procedure(s):  EGD ESOPHAGOGASTRODUODENOSCOPY    Surgeon(s):  Oleksandr Hemphill MD    Assistant:  * No surgical staff found *    Anesthesia: Monitor Anesthesia Care    Estimated Blood Loss (mL): 0    Complications: None    Specimens:   * No specimens in log *    Implants:  * No implants in log *      Drains: * No LDAs found *    Findings: 0    Electronically signed by Oleksandr Hemphill MD on 5/3/2022 at 11:11 AM

## 2022-05-03 NOTE — PROGRESS NOTES
Patient verifies that a responsible adult will be with them for the next 24 hours (mom). Patient has a ride to go home. Discharge instructions reviewed with patient and mom, copy given. Anesthesia instructions reviewed and copy given. Denies questions or concerns.

## 2022-05-03 NOTE — ANESTHESIA POSTPROCEDURE EVALUATION
Department of Anesthesiology  Postprocedure Note    Patient: Richar Baeza  MRN: [id number]  YOB: 1990  Date of evaluation: 5/3/2022  Time:  11:11 AM     Procedure Summary     Date: 05/03/22 Room / Location: [facility] / [facility]    Anesthesia Start: 1102 Anesthesia Stop: 9218    Procedure: EGD ESOPHAGOGASTRODUODENOSCOPY (N/A ) Diagnosis: (EPIGASTRIC PAIN)    Surgeons: Jarod Acosta MD Responsible Provider: Jo Warner MD    Anesthesia Type: MAC ASA Status: 2          Anesthesia Type: MAC    Deandre Phase I: Deandre Score: 10    Deandre Phase II:      Last vitals: Reviewed and per EMR flowsheets.        Anesthesia Post Evaluation    Patient location during evaluation: bedside  Patient participation: complete - patient participated  Level of consciousness: awake and alert  Airway patency: patent  Nausea & Vomiting: no nausea and no vomiting  Complications: no  Cardiovascular status: hemodynamically stable  Respiratory status: acceptable  Hydration status: euvolemic

## 2022-05-03 NOTE — INTERVAL H&P NOTE
Update History & Physical    The patient's History and Physical of April 28, 2021 was reviewed with the patient and I examined the patient. There was no change. The surgical site was confirmed by the patient and me. Plan: The risks, benefits, expected outcome, and alternative to the recommended procedure have been discussed with the patient. Patient understands and wants to proceed with the procedure.      Electronically signed by Jean-Pierre Mann MD on 5/3/2022 at 11:10 AM

## 2022-05-04 NOTE — OP NOTE
[address] [address]                                OPERATIVE REPORT    PATIENT NAME: Corye Lisa                 :        1990  MED REC NO:   [id number]                            ROOM:  ACCOUNT NO:   [de-identified]                           ADMIT DATE: 2022  PROVIDER:     Kwadwo Andino MD    DATE OF PROCEDURE:  2022    PROCEDURE PERFORMED:  Esophagogastroduodenoscopy. PRIMARY CARE PROVIDER:  Megan Hernandez III, DO. PREOPERATIVE DIAGNOSES:  Abdominal pain, history of heavy alcohol  consumption. POSTOPERATIVE DIAGNOSIS:  Normal esophagus, stomach, and duodenum. INDICATIONS:  Young male, 28years of age, who was drinking heavily and  had substantially abnormal liver function studies previously, though he  was not clinically jaundiced. His alcohol abuse subsided and his liver  function studies now are essentially normal with the exception of an ALT  of 52. He complains of abdominal pain. Preop is monitored anesthesia care. DESCRIPTION OF PROCEDURE:  With he in the left lateral decubitus  position, sedation was given. He had a bite block in place. The  Olympus GIF-H190 video endoscope was guided into the cervical esophagus. It was passed down to the stomach. The oropharynx, esophagus, stomach,  and proximal duodenum were all normal.  The endoscope was withdrawn,  confirming the normality of the examination, the procedure terminated  and well tolerated. EBL: 0    IMPRESSION:  A normal EGD. Likely functional complaints.         Natalee Teresa MD    D: 2022 12:08:41       T: 2022 12:15:41     [id number]  Job#: [id number]     Doc#: [id number]    CC:

## 2022-05-06 ENCOUNTER — OFFICE VISIT (OUTPATIENT)
Dept: FAMILY MEDICINE CLINIC | Age: 32
End: 2022-05-06
Payer: MEDICARE

## 2022-05-06 ENCOUNTER — TELEPHONE (OUTPATIENT)
Dept: ENT CLINIC | Age: 32
End: 2022-05-06

## 2022-05-06 VITALS
HEIGHT: 65 IN | BODY MASS INDEX: 24.83 KG/M2 | TEMPERATURE: 97.7 F | HEART RATE: 67 BPM | SYSTOLIC BLOOD PRESSURE: 140 MMHG | DIASTOLIC BLOOD PRESSURE: 76 MMHG | WEIGHT: 149 LBS | OXYGEN SATURATION: 96 %

## 2022-05-06 DIAGNOSIS — R10.13 EPIGASTRIC PAIN: Primary | ICD-10-CM

## 2022-05-06 DIAGNOSIS — R10.10 UPPER ABDOMINAL PAIN: ICD-10-CM

## 2022-05-06 PROCEDURE — G8427 DOCREV CUR MEDS BY ELIG CLIN: HCPCS | Performed by: PHYSICIAN ASSISTANT

## 2022-05-06 PROCEDURE — G8420 CALC BMI NORM PARAMETERS: HCPCS | Performed by: PHYSICIAN ASSISTANT

## 2022-05-06 PROCEDURE — 4004F PT TOBACCO SCREEN RCVD TLK: CPT | Performed by: PHYSICIAN ASSISTANT

## 2022-05-06 PROCEDURE — 99214 OFFICE O/P EST MOD 30 MIN: CPT | Performed by: PHYSICIAN ASSISTANT

## 2022-05-06 RX ORDER — TADALAFIL 20 MG/1
TABLET ORAL
COMMUNITY
Start: 2022-04-28

## 2022-05-06 NOTE — TELEPHONE ENCOUNTER
Patient called in today to see if there was a sooner appt. date that has opened up. Please advise.      Electronically signed by Lola Espino on 5/6/2022 at 2:34 PM

## 2022-05-06 NOTE — TELEPHONE ENCOUNTER
MA LVM for patient informing him there are no sooner appointments available.      Electronically signed by Osvaldo Nicole MA on 5/6/22 at 2:38 PM EDT

## 2022-05-06 NOTE — PROGRESS NOTES
22  Russell Araujo : 1990 Sex: male  Age 28 y.o. Subjective:  Chief Complaint   Patient presents with    Abdominal Pain    Adenopathy         HPI:   Russell Araujo , 28 y.o. male presents to express care for evaluation of upper abdominal pain/epigastric pain. HPI  30-year-old male presents to express care for evaluation of abdominal pain. The patient has had this ongoing abdominal pain for a while. The patient has been seen and evaluated by gastroenterology. The patient has had EGD performed. The patient has had CT scans. All of which have been essentially unremarkable. His laboratory studies do show elevated liver enzymes but they are improving. The patient was inquiring about getting an ultrasound done to evaluate. The patient also had questions about possibly swollen lymph nodes in his cervical region. He does not have any upper respiratory symptoms. He does not have a sore throat. ROS:   Unless otherwise stated in this report the patient's positive and negative responses for review of systems for constitutional, eyes, ENT, cardiovascular, respiratory, gastrointestinal, neurological, , musculoskeletal, and integument systems and related systems to the presenting problem are either stated in the history of present illness or were not pertinent or were negative for the symptoms and/or complaints related to the presenting medical problem. Positives and pertinent negatives as per HPI. All others reviewed and are negative.       PMH:     Past Medical History:   Diagnosis Date    Acid reflux     For EGD 5-3-22    Alcohol related seizure ([address] 2021    Cephalgia 2021    Erectile dysfunction     History of alcohol use disorder 2021    Nausea        Past Surgical History:   Procedure Laterality Date    CYSTOSCOPY      UPPER GASTROINTESTINAL ENDOSCOPY N/A 5/3/2022    EGD ESOPHAGOGASTRODUODENOSCOPY performed by Dileep Tyler MD at [facility]       Family History   Problem Relation Age of Onset    High Cholesterol Father        Medications:     Current Outpatient Medications:     tadalafil (CIALIS) 20 MG tablet, TAKE 1 TABLET BY MOUTH EVERY DAY SEXUAL ACTIVITY AND 30 MINUTES BEFORE SEXUAL ACTIVITY AS NEEDED. DO NOT USE MORE THAN 1 DOSE PER 24 HOURS, Disp: , Rfl:     Allergies: Allergies   Allergen Reactions    Cefazolin Rash    Cefprozil Rash       Social History:     Social History     Tobacco Use    Smoking status: Current Every Day Smoker     Packs/day: 0.25     Types: Cigarettes    Smokeless tobacco: Never Used   Vaping Use    Vaping Use: Some days    Substances: Nicotine, Flavoring    Passive vaping exposure: Yes   Substance Use Topics    Alcohol use: Not Currently     Alcohol/week: 15.0 standard drinks     Types: 15 Cans of beer per week     Comment: stopped 1/16/22    Drug use: Not Currently     Frequency: 2.0 times per week     Types: Cocaine     Comment: states he quit cocaine last 4/2021       Patient lives at home. Physical Exam:     Vitals:    05/06/22 1605   BP: (!) 140/76   Pulse: 67   Temp: 97.7 °F (36.5 °C)   SpO2: 96%   Weight: 149 lb (67.6 kg)   Height: 5' 5\" (1.651 m)       Exam:  Physical Exam  Nurse's notes and vital signs reviewed. The patient is not hypoxic. General: Alert, no acute distress, patient resting comfortably Patient is not toxic or lethargic. Skin: Warm, intact, no pallor noted. There is no evidence of rash at this time. Head: Normocephalic, atraumatic. Eye: Normal conjunctiva  Ears, Nose, Throat: Right tympanic membrane clear, left tympanic membrane clear. No drainage or discharge noted. No pre- or post-auricular tenderness, erythema, or swelling noted. No rhinorrhea or congestion noted. No facial erythema. Neck: No anterior/posterior lymphadenopathy noted. No erythema, no masses, no fluctuance or induration noted. No meningeal signs.   Cardio: Regular Rate and Rhythm  Respiratory: No acute distress, no rhonchi, wheezing or crackles noted. No stridor or retractions are noted. Abdomen: Normal bowel sounds, soft, minimal epigastric tenderness, no rebound, no pulsatile masses, no masses detected. No rebound, guarding, or rigidity noted. Neurological: A&O x4, normal speech  Psychiatric: Cooperative         Testing:           Medical Decision Making:     Vital signs reviewed    Past medical history reviewed. Allergies reviewed. Medications reviewed. Patient on arrival does not appear to be in any apparent distress or discomfort. The patient has been seen and evaluated. The patient does not appear to be toxic or lethargic. The patient will have a ultrasound abdomen limited. It is pending at this time. We will try to schedule him first part of the week next week. The patient is to return to express care or go directly to the emergency department should any of the signs or symptoms worsen. The patient is to followup with primary care physician in 2-3 days for repeat evaluation. The patient has no other questions or concerns at this time the patient will be discharged home. Clinical Impression:   Partha Guillen was seen today for abdominal pain and adenopathy. Diagnoses and all orders for this visit:    Epigastric pain  -     US ABDOMEN LIMITED; Future    Upper abdominal pain  -     US ABDOMEN LIMITED; Future        The patient is to call for any concerns or return if any of the signs or symptoms worsen. The patient is to follow-up with PCP in the next 2-3 days for repeat evaluation repeat assessment or go directly to the emergency department.      SIGNATURE: Kezia Hughes III, PA-C

## 2022-05-27 ENCOUNTER — OFFICE VISIT (OUTPATIENT)
Dept: FAMILY MEDICINE CLINIC | Age: 32
End: 2022-05-27
Payer: MEDICARE

## 2022-05-27 VITALS
RESPIRATION RATE: 18 BRPM | BODY MASS INDEX: 24.83 KG/M2 | TEMPERATURE: 97.1 F | SYSTOLIC BLOOD PRESSURE: 126 MMHG | DIASTOLIC BLOOD PRESSURE: 86 MMHG | HEART RATE: 86 BPM | HEIGHT: 65 IN | WEIGHT: 149 LBS

## 2022-05-27 DIAGNOSIS — Z72.51 HIGH RISK HETEROSEXUAL BEHAVIOR: Primary | ICD-10-CM

## 2022-05-27 DIAGNOSIS — Z72.51 HIGH RISK HETEROSEXUAL BEHAVIOR: ICD-10-CM

## 2022-05-27 PROCEDURE — 81003 URINALYSIS AUTO W/O SCOPE: CPT | Performed by: PHYSICIAN ASSISTANT

## 2022-05-27 PROCEDURE — G8427 DOCREV CUR MEDS BY ELIG CLIN: HCPCS | Performed by: PHYSICIAN ASSISTANT

## 2022-05-27 PROCEDURE — 99214 OFFICE O/P EST MOD 30 MIN: CPT | Performed by: PHYSICIAN ASSISTANT

## 2022-05-27 PROCEDURE — G8420 CALC BMI NORM PARAMETERS: HCPCS | Performed by: PHYSICIAN ASSISTANT

## 2022-05-27 PROCEDURE — 4004F PT TOBACCO SCREEN RCVD TLK: CPT | Performed by: PHYSICIAN ASSISTANT

## 2022-05-27 NOTE — PROGRESS NOTES
22  Eben Park : 1990 Sex: male  Age 28 y.o. Subjective:  Chief Complaint   Patient presents with    Sexually Transmitted Diseases         HPI:   Eben Park , 28 y.o. male presents to express care for evaluation of sexually transmitted infection screening    HPI  81-year-old male presents to express care for evaluation of possible STD. The patient tried to get in with his urologist and they said that they are not scheduling till July. The patient was inquiring about getting testing done. The patient is not having any fever, chills. The patient states that he occasionally will have these small bumps around the genital area. The patient is not any chest pain, shortness of breath. The patient has not been sexually active in over 2 years. The patient does have a history of erectile dysfunction as well. Patient states that he used to have a relatively high risk sexual behavior. He rarely use condoms. The patient had intercourse while drinking. This was heterosexual behavior. ROS:   Unless otherwise stated in this report the patient's positive and negative responses for review of systems for constitutional, eyes, ENT, cardiovascular, respiratory, gastrointestinal, neurological, , musculoskeletal, and integument systems and related systems to the presenting problem are either stated in the history of present illness or were not pertinent or were negative for the symptoms and/or complaints related to the presenting medical problem. Positives and pertinent negatives as per HPI. All others reviewed and are negative.       PMH:     Past Medical History:   Diagnosis Date    Acid reflux     For EGD 5-3-22    Alcohol related seizure [address] [address] 2021    Cephalgia 2021    Erectile dysfunction     History of alcohol use disorder 2021    Nausea        Past Surgical History:   Procedure Laterality Date    CYSTOSCOPY  2008    UPPER GASTROINTESTINAL ENDOSCOPY N/A 5/3/2022    EGD ESOPHAGOGASTRODUODENOSCOPY performed by Wandy Keyes MD at [facility] ENDOSCOPY       Family History   Problem Relation Age of Onset    High Cholesterol Father        Medications:     Current Outpatient Medications:     tadalafil (CIALIS) 20 MG tablet, TAKE 1 TABLET BY MOUTH EVERY DAY SEXUAL ACTIVITY AND 30 MINUTES BEFORE SEXUAL ACTIVITY AS NEEDED. DO NOT USE MORE THAN 1 DOSE PER 24 HOURS, Disp: , Rfl:     Allergies: Allergies   Allergen Reactions    Cefazolin Rash    Cefprozil Rash       Social History:     Social History     Tobacco Use    Smoking status: Current Every Day Smoker     Packs/day: 0.25     Types: Cigarettes    Smokeless tobacco: Never Used   Vaping Use    Vaping Use: Some days    Substances: Nicotine, Flavoring    Passive vaping exposure: Yes   Substance Use Topics    Alcohol use: Not Currently     Alcohol/week: 15.0 standard drinks     Types: 15 Cans of beer per week     Comment: stopped 1/16/22    Drug use: Not Currently     Frequency: 2.0 times per week     Types: Cocaine     Comment: states he quit cocaine last 4/2021       Patient lives at home. Physical Exam:     Vitals:    05/27/22 1517   BP: 126/86   Pulse: 86   Resp: 18   Temp: 97.1 °F (36.2 °C)   Weight: 149 lb (67.6 kg)   Height: 5' 5\" (1.651 m)       Exam:  Physical Exam  Nurses note and vital signs reviewed and patient is not hypoxic. General: The patient appears well and in no apparent distress. Patient is resting comfortably on cart. Skin: Warm, dry, no pallor noted. There is no rash noted. Head: Normocephalic, atraumatic. Eye: Normal conjunctiva  Ears, Nose, Mouth, and Throat: Wearing mask  Cardiovascular: Regular Rate and Rhythm  Respiratory: Patient is in no distress, no accessory muscle use, lungs are clear to auscultation, no wheezing, crackles or rhonchi  Back: Non-tender, no CVA tenderness bilaterally to percussion.   GI: Normal bowel sounds, no tenderness to palpation, no masses appreciated. No rebound, guarding, or rigidity noted. The patient had evaluation of the genitalia, circumcised male, there is no evidence of a genital lesion, there is some redness and discoloration noted to the mons pubis area. There is another area to the left leg but these do not appear to be vesicular in nature. There is no testicular swelling  Neurological: A&O x4, normal speech        Testing:           Medical Decision Making:     Vital signs reviewed    Past medical history reviewed. Allergies reviewed. Medications reviewed. Patient on arrival does not appear to be in any apparent distress or discomfort. The patient has been seen and evaluated. The patient does not appear to be toxic or lethargic. The patient will be sent for laboratory studies including urinalysis urine culture, urine GC and chlamydia, HIV screen, RPR, treponema palladium antibody, HSV     the patient is to refrain from any sexual intercourse. As urged in previous visits the patient does need close follow-up with his primary care physician. The patient is to return to express care or go directly to the emergency department should any of the signs or symptoms worsen. The patient is to followup with primary care physician in 2-3 days for repeat evaluation. The patient has no other questions or concerns at this time the patient will be discharged home. Clinical Impression:   Virginie Artis was seen today for sexually transmitted diseases. Diagnoses and all orders for this visit:    High risk heterosexual behavior  -     Urinalysis; Future  -     Culture, Urine; Future  -     RPR; Future  -     HERPES SIMPLEX VIRUS (HSV) I/II ANTIBODIES IGG & IGM W/ REFLEX; Future  -     C.trachomatis N.gonorrhoeae DNA, Urine; Future  -     TREPONEMA PALLIDUM (SYPHILLIS) ANTIBODY BY TP-PA; Future  -     HIV Screen; Future        The patient is to call for any concerns or return if any of the signs or symptoms worsen.  The patient is to follow-up with PCP in the next 2-3 days for repeat evaluation repeat assessment or go directly to the emergency department.      SIGNATURE: Jamar Chung III, PA-C

## 2022-05-31 ENCOUNTER — OFFICE VISIT (OUTPATIENT)
Dept: ENT CLINIC | Age: 32
End: 2022-05-31
Payer: MEDICARE

## 2022-05-31 VITALS
DIASTOLIC BLOOD PRESSURE: 81 MMHG | WEIGHT: 149 LBS | HEIGHT: 65 IN | HEART RATE: 80 BPM | BODY MASS INDEX: 24.83 KG/M2 | SYSTOLIC BLOOD PRESSURE: 128 MMHG | OXYGEN SATURATION: 98 % | TEMPERATURE: 98.1 F

## 2022-05-31 DIAGNOSIS — J32.4 CHRONIC PANSINUSITIS: Primary | ICD-10-CM

## 2022-05-31 DIAGNOSIS — Z87.898 HISTORY OF SEIZURE: ICD-10-CM

## 2022-05-31 DIAGNOSIS — R13.10 DYSPHAGIA, UNSPECIFIED TYPE: ICD-10-CM

## 2022-05-31 LAB
HIV-1 AND HIV-2 ANTIBODIES: NORMAL
RPR: NORMAL

## 2022-05-31 PROCEDURE — 4004F PT TOBACCO SCREEN RCVD TLK: CPT | Performed by: OTOLARYNGOLOGY

## 2022-05-31 PROCEDURE — G8420 CALC BMI NORM PARAMETERS: HCPCS | Performed by: OTOLARYNGOLOGY

## 2022-05-31 PROCEDURE — G8427 DOCREV CUR MEDS BY ELIG CLIN: HCPCS | Performed by: OTOLARYNGOLOGY

## 2022-05-31 PROCEDURE — 99214 OFFICE O/P EST MOD 30 MIN: CPT | Performed by: OTOLARYNGOLOGY

## 2022-05-31 RX ORDER — FLUTICASONE PROPIONATE 50 MCG
1 SPRAY, SUSPENSION (ML) NASAL DAILY
COMMUNITY

## 2022-05-31 RX ORDER — AZELASTINE 1 MG/ML
1 SPRAY, METERED NASAL 2 TIMES DAILY
Qty: 30 ML | Refills: 1 | Status: SHIPPED | OUTPATIENT
Start: 2022-05-31

## 2022-05-31 ASSESSMENT — ENCOUNTER SYMPTOMS
VOMITING: 0
COUGH: 0
ALLERGIC/IMMUNOLOGIC NEGATIVE: 1
SHORTNESS OF BREATH: 0
BACK PAIN: 0
SORE THROAT: 0
EYE DISCHARGE: 0
DIARRHEA: 0
SINUS PRESSURE: 1
EYE PAIN: 0
RHINORRHEA: 0

## 2022-05-31 NOTE — PROGRESS NOTES
Subjective:      Patient ID:  Anibal Lugo is a 28 y.o. male. HPI:  Pt returns for recheck of allergies accompanied with chronic sinus pressure . History of   no surgery  When? Na     Nasal Steroid: yes   Name: fluticasone (Flonase)   Still taking: yes       Other therapy:   Astelin- no  oral antihistamine- none  leukotriene inhibitor- none  oral decongestant- none    which has been  ineffective     Pt has been on therapy for 3 month(s) and is doing poorly    The patient is complaining of nasal congestion and Sinus pressure    The patients worst time of year is year round    The patient states that he has the general sensation of \"Head fullness\". He states that these symptoms have no been improved with the use of flonase. The patient states the has generalized sinus pressure. Patient's medications, allergies, past medical, surgical, social and family histories were reviewed and updated as appropriate. Review of Systems   Constitutional: Negative for chills and fever. HENT: Positive for congestion, postnasal drip and sinus pressure. Negative for rhinorrhea, sneezing and sore throat. Eyes: Negative for pain and discharge. Respiratory: Negative for cough and shortness of breath. Cardiovascular: Negative for chest pain. Gastrointestinal: Negative for diarrhea and vomiting. Genitourinary: Negative for flank pain. Musculoskeletal: Negative for back pain and neck pain. Skin: Negative for rash. Allergic/Immunologic: Negative. Neurological: Negative for syncope and headaches. All other systems reviewed and are negative. Objective:     Vitals:    05/31/22 1323   BP: 128/81   Pulse: 80   Temp: 98.1 °F (36.7 °C)   SpO2: 98%     Physical Exam  Constitutional:       Appearance: Normal appearance. He is normal weight. HENT:      Head: Normocephalic and atraumatic. Jaw: Tenderness and pain on movement present.       Right Ear: Tympanic membrane, ear canal and external ear normal.      Left Ear: Tympanic membrane, ear canal and external ear normal.      Nose: Septal deviation present. Mouth/Throat:      Lips: Pink. Mouth: Mucous membranes are moist.      Pharynx: Oropharynx is clear. Comments: Dysphagia  Eyes:      General: Lids are normal.      Conjunctiva/sclera: Conjunctivae normal.      Pupils: Pupils are equal, round, and reactive to light. Cardiovascular:      Rate and Rhythm: Normal rate and regular rhythm. Pulses: Normal pulses. Heart sounds: Normal heart sounds. Pulmonary:      Effort: Pulmonary effort is normal.      Breath sounds: No stridor. No wheezing. Abdominal:      General: Abdomen is flat. Palpations: Abdomen is soft. Musculoskeletal:         General: No signs of injury. Normal range of motion. Cervical back: Normal range of motion and neck supple. Skin:     General: Skin is warm and dry. Neurological:      Mental Status: He is alert and oriented to person, place, and time. Psychiatric:         Attention and Perception: Attention and perception normal.         Mood and Affect: Mood normal.                 Assessment:       Diagnosis Orders   1. Chronic pansinusitis  BUN & Creatinine    CT SINUS WO CONTRAST   2. Dysphagia, unspecified type  BUN & Creatinine    CT SOFT TISSUE NECK W WO CONTRAST   3. History of seizure  Roderick English MD, Neurology, [facility]              Plan:      Patient has been instructed to use Flonase one spray to each nostril twice daily with the addition of Asteline 1 spray to each nostril twice daily. A CT scan of the sinuses will be completed prior to his next appointment. A CT of the neck was placed due to painful swallowing and dysphagia. Due to the patient's history of seizures and neuralgia a consult was placed to neurology. The patient was instructed to call prior to his next appointment for nay new or worsening symptoms.      Follow up in 1 month(s) for results of CT scan.

## 2022-06-04 LAB — TREPONEMA PALLIDUM ANTIBODIES: NON REACTIVE

## 2022-06-05 LAB
HERPES TYPE 1/2 IGM COMBINED: 1.09 IV
HERPES TYPE I/II IGG COMBINED: 0.33 IV

## 2022-06-08 ENCOUNTER — HOSPITAL ENCOUNTER (OUTPATIENT)
Age: 32
Discharge: HOME | End: 2022-06-08
Payer: MEDICARE

## 2022-06-08 ENCOUNTER — HOSPITAL ENCOUNTER (OUTPATIENT)
Dept: CT IMAGING | Age: 32
Discharge: HOME | End: 2022-06-10
Payer: MEDICARE

## 2022-06-08 DIAGNOSIS — R13.10 DYSPHAGIA, UNSPECIFIED TYPE: ICD-10-CM

## 2022-06-08 DIAGNOSIS — J32.4 CHRONIC PANSINUSITIS: ICD-10-CM

## 2022-06-08 LAB
BUN BLDV-MCNC: 7 MG/DL (ref 6–20)
CREAT SERPL-MCNC: 0.8 MG/DL (ref 0.7–1.2)
GFR AFRICAN AMERICAN: >60
GFR NON-AFRICAN AMERICAN: >60 ML/MIN/1.73

## 2022-06-08 PROCEDURE — 70486 CT MAXILLOFACIAL W/O DYE: CPT

## 2022-06-08 PROCEDURE — 2580000003 HC RX 258: Performed by: RADIOLOGY

## 2022-06-08 PROCEDURE — 70492 CT SFT TSUE NCK W/O & W/DYE: CPT

## 2022-06-08 PROCEDURE — 6360000004 HC RX CONTRAST MEDICATION: Performed by: RADIOLOGY

## 2022-06-08 PROCEDURE — 84520 ASSAY OF UREA NITROGEN: CPT

## 2022-06-08 PROCEDURE — 82565 ASSAY OF CREATININE: CPT

## 2022-06-08 PROCEDURE — 36415 COLL VENOUS BLD VENIPUNCTURE: CPT

## 2022-06-08 RX ORDER — SODIUM CHLORIDE 0.9 % (FLUSH) 0.9 %
10 SYRINGE (ML) INJECTION
Status: COMPLETED | OUTPATIENT
Start: 2022-06-08 | End: 2022-06-08

## 2022-06-08 RX ADMIN — Medication 10 ML: at 13:16

## 2022-06-08 RX ADMIN — IOPAMIDOL 90 ML: 755 INJECTION, SOLUTION INTRAVENOUS at 13:16

## 2022-06-15 ENCOUNTER — TELEPHONE (OUTPATIENT)
Dept: ENT CLINIC | Age: 32
End: 2022-06-15

## 2022-06-17 ENCOUNTER — HOSPITAL ENCOUNTER (OUTPATIENT)
Dept: GENERAL RADIOLOGY | Age: 32
Discharge: HOME | End: 2022-06-19
Payer: MEDICARE

## 2022-06-17 ENCOUNTER — HOSPITAL ENCOUNTER (OUTPATIENT)
Age: 32
Discharge: HOME | End: 2022-06-19
Payer: MEDICARE

## 2022-06-17 DIAGNOSIS — R10.2 PELVIC AND PERINEAL PAIN: ICD-10-CM

## 2022-06-17 DIAGNOSIS — M99.03 SEGMENTAL AND SOMATIC DYSFUNCTION OF LUMBAR REGION: ICD-10-CM

## 2022-06-17 PROCEDURE — 73521 X-RAY EXAM HIPS BI 2 VIEWS: CPT

## 2022-06-17 PROCEDURE — 72220 X-RAY EXAM SACRUM TAILBONE: CPT

## 2022-06-20 ENCOUNTER — TELEPHONE (OUTPATIENT)
Dept: CARDIOLOGY CLINIC | Age: 32
End: 2022-06-20

## 2022-06-20 DIAGNOSIS — R00.2 PALPITATIONS: Primary | ICD-10-CM

## 2022-06-23 ENCOUNTER — TELEPHONE (OUTPATIENT)
Dept: ENT CLINIC | Age: 32
End: 2022-06-23

## 2022-06-23 RX ORDER — AMOXICILLIN AND CLAVULANATE POTASSIUM 875; 125 MG/1; MG/1
1 TABLET, FILM COATED ORAL 2 TIMES DAILY
Qty: 28 TABLET | Refills: 0 | Status: SHIPPED | OUTPATIENT
Start: 2022-06-23 | End: 2022-07-07

## 2022-06-23 NOTE — TELEPHONE ENCOUNTER
Patient notified script sent to pharmacy. Dr Christina Cuenca recommending PCP to evaluate current symptoms. Patient aware.

## 2022-06-24 ENCOUNTER — NURSE ONLY (OUTPATIENT)
Dept: CARDIOLOGY CLINIC | Age: 32
End: 2022-06-24

## 2022-06-24 NOTE — PROGRESS NOTES
Patient was in today for 1 day ZIO XT   monitor per . Patient was given instructions and questions answered, verbalize understanding. [facility] P /Ryder CORREA       Serial number: [id number]    Stephane Taylor MA

## 2022-06-29 ENCOUNTER — OFFICE VISIT (OUTPATIENT)
Dept: ENT CLINIC | Age: 32
End: 2022-06-29
Payer: MEDICARE

## 2022-06-29 VITALS
HEIGHT: 65 IN | HEART RATE: 61 BPM | WEIGHT: 149 LBS | BODY MASS INDEX: 24.83 KG/M2 | DIASTOLIC BLOOD PRESSURE: 82 MMHG | SYSTOLIC BLOOD PRESSURE: 125 MMHG

## 2022-06-29 DIAGNOSIS — J32.0 CHRONIC MAXILLARY SINUSITIS: ICD-10-CM

## 2022-06-29 DIAGNOSIS — J32.4 CHRONIC PANSINUSITIS: Primary | ICD-10-CM

## 2022-06-29 DIAGNOSIS — J34.2 DNS (DEVIATED NASAL SEPTUM): ICD-10-CM

## 2022-06-29 DIAGNOSIS — R13.10 DYSPHAGIA, UNSPECIFIED TYPE: ICD-10-CM

## 2022-06-29 PROCEDURE — G8420 CALC BMI NORM PARAMETERS: HCPCS | Performed by: OTOLARYNGOLOGY

## 2022-06-29 PROCEDURE — 99213 OFFICE O/P EST LOW 20 MIN: CPT | Performed by: OTOLARYNGOLOGY

## 2022-06-29 PROCEDURE — 4004F PT TOBACCO SCREEN RCVD TLK: CPT | Performed by: OTOLARYNGOLOGY

## 2022-06-29 PROCEDURE — G8427 DOCREV CUR MEDS BY ELIG CLIN: HCPCS | Performed by: OTOLARYNGOLOGY

## 2022-06-29 ASSESSMENT — ENCOUNTER SYMPTOMS
SINUS PRESSURE: 1
DIARRHEA: 0
COUGH: 0
EYE DISCHARGE: 0
VOMITING: 0
SORE THROAT: 0
ALLERGIC/IMMUNOLOGIC NEGATIVE: 1
BACK PAIN: 0
EYE PAIN: 0
RHINORRHEA: 0
SHORTNESS OF BREATH: 0

## 2022-06-29 NOTE — PROGRESS NOTES
Subjective:      Patient ID:  Zehra Senior is a 28 y.o. male. HPI:    Pt returns for review of CT Sinus. There are not changes since last visit. Pt still having pain issues with swallowing as well.      Pt has been on fluticasone (Flonase) for 1 month(s) and is doing adequately      Past Medical History:   Diagnosis Date    Acid reflux     For EGD 5-3-22    Alcohol related seizure ([address] 12/20/2021    Cephalgia 12/20/2021    Erectile dysfunction     History of alcohol use disorder 12/20/2021    Nausea      Past Surgical History:   Procedure Laterality Date    CYSTOSCOPY  2008    UPPER GASTROINTESTINAL ENDOSCOPY N/A 5/3/2022    EGD ESOPHAGOGASTRODUODENOSCOPY performed by Guerline Briones MD at [address] [place] [address] History   Problem Relation Age of Onset    High Cholesterol Father      Social History     Socioeconomic History    Marital status: Single     Spouse name: None    Number of children: None    Years of education: None    Highest education level: None   Occupational History    None   Tobacco Use    Smoking status: Current Every Day Smoker     Packs/day: 0.25     Types: Cigarettes    Smokeless tobacco: Never Used   Vaping Use    Vaping Use: Some days    Substances: Nicotine, Flavoring    Passive vaping exposure: Yes   Substance and Sexual Activity    Alcohol use: Not Currently     Alcohol/week: 15.0 standard drinks     Types: 15 Cans of beer per week     Comment: stopped 1/16/22    Drug use: Not Currently     Frequency: 2.0 times per week     Types: Cocaine     Comment: states he quit cocaine last 4/2021    Sexual activity: None   Other Topics Concern    None   Social History Narrative    None     Social Determinants of Health     Financial Resource Strain:     Difficulty of Paying Living Expenses: Not on file   Food Insecurity:     Worried About Running Out of Food in the Last Year: Not on file    Shane of Food in the Last Year: Not on file   Transportation Needs: Tenderness and pain on movement present. Right Ear: Tympanic membrane, ear canal and external ear normal.      Left Ear: Tympanic membrane, ear canal and external ear normal.      Nose: Septal deviation present. Mouth/Throat:      Lips: Pink. Mouth: Mucous membranes are moist.      Pharynx: Oropharynx is clear. Comments: Dysphagia  Eyes:      General: Lids are normal.      Conjunctiva/sclera: Conjunctivae normal.      Pupils: Pupils are equal, round, and reactive to light. Cardiovascular:      Rate and Rhythm: Normal rate and regular rhythm. Pulses: Normal pulses. Heart sounds: Normal heart sounds. Pulmonary:      Effort: Pulmonary effort is normal.      Breath sounds: No stridor. No wheezing. Abdominal:      General: Abdomen is flat. Palpations: Abdomen is soft. Musculoskeletal:         General: No signs of injury. Normal range of motion. Cervical back: Normal range of motion and neck supple. Skin:     General: Skin is warm and dry. Neurological:      Mental Status: He is alert and oriented to person, place, and time.    Psychiatric:         Attention and Perception: Attention and perception normal.         Mood and Affect: Mood normal.             CT sinuses (my review)    no - keagan cells -   no - meenu bullosa -   yes - Enlarged inferior turbinates - bilateral   yes - Obstructed OMC -bilateral   yes - maxillary sinus disease -left, mild  no - frontal sinus disease- ,   Left frontal sinus   Absent -  no   Size - Moderate  Right frontal sinus   Absent -  no   Size - Moderate  yes - sphenoid sinus disease - right,severe  yes - Deviated nasal septum - left,  severe   yes - Septal Spur - left,severe   no - posterior accessory os -,       CT sinus report:  Impression   1.  Chronic right sphenoid sinusitis with wall thickening/osteitis.  Mild   dehiscence of the posterior wall of the right ethmoid sinus.  Please see   above comments.       2.  Mucous retention cyst or nasal polyp in the inferior left maxillary sinus.         Ct neck report  Impression   1. No acute abnormality of the soft tissue structures of the neck. 2. Chronic sinus disease, most notably in the right sphenoid sinus. 3. Borderline left level 3 lymph node, of doubtful significance.  No mass or   lymphadenopathy seen in the neck. Assessment:       Diagnosis Orders   1. Chronic pansinusitis     2. Dysphagia, unspecified type     3. Chronic maxillary sinusitis     4. DNS (deviated nasal septum)                Plan:      I recommend:    Functional endoscopic sinus surgery with bilateral maxillary, frontal and sphenoid antrostomies, submucous resection of inferior turbinates, septoplasty    The procedure risks and benefits were discussed with the patient and family. Pt and family understood and decided to proceed with the surgery. Surgical risks include:    --Injury to the lining of the brain, meningitis, stroke and death - most common in the approach to the frontal sinus but may also occur in operations on the sphenoid and ethmoid sinuses  --Injury to the carotid artery - most common in sphenoid sinus surgery  --Injury to the orbit, eye and eye muscles. Most commonly the medial rectus muscle is injured, this can cause double vision. The optic nerve can be injured during sphenoid sinus surgery. Injury to the Nasolacrimal Duct can cause tearing. -- Bleeding or air in the orbit. If this happens, blindness can occur due to pressure. Relief of the pressure is mandatory to prevent loss of sight. Septoplasty  (nasal septum and turbinate surgery)       Surgical risks include:    -- Recurrence of the septal deviation with recurrence of the airway obstruction. The nasal septum is made of cartilage. Cartilage has a memory and sometimes over the course of hours will bend back to the preoperative position. -- Septal Hematoma:  This can occur if a drainage hole was not created in the septum. (Note: most surgeons have the problem of too many holes created during surgery, not too few). The cartilage has no blood supply and receives its nutrients from the overlying mucosa. A septal hematoma elevates the mucosal flaps off of the cartilage, resulting in cartilage death and usually infection. If the entire septum is lost the nose may collapse creating a saddle deformity. -- Hole in the nasal septum. This occurs where there are two opposing holes in the nasal septal flaps. A hole in the nasal septum may result in disturbing crusting, bleeding and whistling.   -- Saddle Nose: If too much supporting cartilage is removed, the mid-portion of the nose may sag creating a \"Saddle Nose\" deformity.           Follow up 1 week after surgery

## 2022-06-29 NOTE — PATIENT INSTRUCTIONS
Thank you for choosing our [facility] or [facility]. We are committed to your medical treatment and  care. If you need to reschedule or cancel your surgery or follow up  appointment, please call the surgery scheduler at [phone]. INSTRUCTIONS FOR SURGERY FESS,Septoplasty SMRITS    Nothing to eat or drink after midnight the night before surgery unless surgery is at [facility] or otherwise instructed by the hospital.    DO NOT TAKE ANY ASPIRIN PRODUCTS 7 days prior to surgery-unless required by your cardiologist or primary care physician. Tylenol only. No Advil, Motrin, Aleve, or Ibuprofen    Any illegal drugs in your system (including Marijuana even if legally prescribed) will result in your surgery being cancelled. Please be sure to check with our office or the hospital on time frame for the drugs to be out of your system. Should your insurance change at any time you must contact our office. Failure to do so may result in your surgery being rescheduled. If you need paperwork filled out for work, you must give the office 2 weeks to complete and submit the forms.       [address], [address] [facility] will call you a couple days prior to your surgery and give you further instructions, if any questions call them at [phone]

## 2022-07-06 ENCOUNTER — TELEPHONE (OUTPATIENT)
Dept: CARDIOLOGY CLINIC | Age: 32
End: 2022-07-06

## 2022-07-06 DIAGNOSIS — R00.2 PALPITATIONS: ICD-10-CM

## 2022-07-06 NOTE — TELEPHONE ENCOUNTER
----- Message from Mary Lin MD sent at 7/6/2022  8:42 AM EDT -----  Monitor completely normal when he triggered  Ov prn

## 2022-07-19 ENCOUNTER — APPOINTMENT (OUTPATIENT)
Dept: CT IMAGING | Age: 32
End: 2022-07-19
Payer: MEDICARE

## 2022-07-19 ENCOUNTER — HOSPITAL ENCOUNTER (EMERGENCY)
Age: 32
Discharge: HOME | End: 2022-07-19
Attending: EMERGENCY MEDICINE
Payer: MEDICARE

## 2022-07-19 ENCOUNTER — APPOINTMENT (OUTPATIENT)
Dept: GENERAL RADIOLOGY | Age: 32
End: 2022-07-19
Payer: MEDICARE

## 2022-07-19 VITALS
DIASTOLIC BLOOD PRESSURE: 66 MMHG | SYSTOLIC BLOOD PRESSURE: 118 MMHG | WEIGHT: 149 LBS | OXYGEN SATURATION: 99 % | RESPIRATION RATE: 18 BRPM | BODY MASS INDEX: 24.79 KG/M2 | HEART RATE: 74 BPM | TEMPERATURE: 98.2 F

## 2022-07-19 DIAGNOSIS — R20.2 PARESTHESIA: ICD-10-CM

## 2022-07-19 DIAGNOSIS — R51.9 RECURRENT OCCIPITAL HEADACHE: Primary | ICD-10-CM

## 2022-07-19 DIAGNOSIS — R42 INTERMITTENT VERTIGO: ICD-10-CM

## 2022-07-19 LAB
ALBUMIN SERPL-MCNC: 5.3 G/DL (ref 3.5–5.2)
ALP BLD-CCNC: 116 U/L (ref 40–129)
ALT SERPL-CCNC: 52 U/L (ref 0–40)
ANION GAP SERPL CALCULATED.3IONS-SCNC: 11 MMOL/L (ref 7–16)
AST SERPL-CCNC: 32 U/L (ref 0–39)
BASOPHILS ABSOLUTE: 0.01 E9/L (ref 0–0.2)
BASOPHILS RELATIVE PERCENT: 0.1 % (ref 0–2)
BILIRUB SERPL-MCNC: 1 MG/DL (ref 0–1.2)
BUN BLDV-MCNC: 8 MG/DL (ref 6–20)
CALCIUM SERPL-MCNC: 9.9 MG/DL (ref 8.6–10.2)
CHLORIDE BLD-SCNC: 100 MMOL/L (ref 98–107)
CO2: 25 MMOL/L (ref 22–29)
CREAT SERPL-MCNC: 0.8 MG/DL (ref 0.7–1.2)
EKG ATRIAL RATE: 70 BPM
EKG P AXIS: 55 DEGREES
EKG P-R INTERVAL: 162 MS
EKG Q-T INTERVAL: 376 MS
EKG QRS DURATION: 96 MS
EKG QTC CALCULATION (BAZETT): 406 MS
EKG R AXIS: 41 DEGREES
EKG T AXIS: 34 DEGREES
EKG VENTRICULAR RATE: 70 BPM
EOSINOPHILS ABSOLUTE: 0.03 E9/L (ref 0.05–0.5)
EOSINOPHILS RELATIVE PERCENT: 0.4 % (ref 0–6)
GFR AFRICAN AMERICAN: >60
GFR NON-AFRICAN AMERICAN: >60 ML/MIN/1.73
GLUCOSE BLD-MCNC: 93 MG/DL (ref 74–99)
HCT VFR BLD CALC: 40 % (ref 37–54)
HEMOGLOBIN: 13.9 G/DL (ref 12.5–16.5)
IMMATURE GRANULOCYTES #: 0.02 E9/L
IMMATURE GRANULOCYTES %: 0.3 % (ref 0–5)
LYMPHOCYTES ABSOLUTE: 1.99 E9/L (ref 1.5–4)
LYMPHOCYTES RELATIVE PERCENT: 29 % (ref 20–42)
MAGNESIUM: 1.8 MG/DL (ref 1.6–2.6)
MCH RBC QN AUTO: 30.3 PG (ref 26–35)
MCHC RBC AUTO-ENTMCNC: 34.8 % (ref 32–34.5)
MCV RBC AUTO: 87.3 FL (ref 80–99.9)
MONOCYTES ABSOLUTE: 0.47 E9/L (ref 0.1–0.95)
MONOCYTES RELATIVE PERCENT: 6.8 % (ref 2–12)
NEUTROPHILS ABSOLUTE: 4.35 E9/L (ref 1.8–7.3)
NEUTROPHILS RELATIVE PERCENT: 63.4 % (ref 43–80)
PDW BLD-RTO: 11.8 FL (ref 11.5–15)
PLATELET # BLD: 164 E9/L (ref 130–450)
PMV BLD AUTO: 10.3 FL (ref 7–12)
POTASSIUM SERPL-SCNC: 4.2 MMOL/L (ref 3.5–5)
RBC # BLD: 4.58 E12/L (ref 3.8–5.8)
SODIUM BLD-SCNC: 136 MMOL/L (ref 132–146)
TOTAL CK: 149 U/L (ref 20–200)
TOTAL PROTEIN: 8.1 G/DL (ref 6.4–8.3)
TROPONIN, HIGH SENSITIVITY: <6 NG/L (ref 0–11)
WBC # BLD: 6.9 E9/L (ref 4.5–11.5)

## 2022-07-19 PROCEDURE — 70450 CT HEAD/BRAIN W/O DYE: CPT

## 2022-07-19 PROCEDURE — 80053 COMPREHEN METABOLIC PANEL: CPT

## 2022-07-19 PROCEDURE — 82550 ASSAY OF CK (CPK): CPT

## 2022-07-19 PROCEDURE — 85025 COMPLETE CBC W/AUTO DIFF WBC: CPT

## 2022-07-19 PROCEDURE — 6360000002 HC RX W HCPCS: Performed by: STUDENT IN AN ORGANIZED HEALTH CARE EDUCATION/TRAINING PROGRAM

## 2022-07-19 PROCEDURE — 2580000003 HC RX 258: Performed by: STUDENT IN AN ORGANIZED HEALTH CARE EDUCATION/TRAINING PROGRAM

## 2022-07-19 PROCEDURE — 96375 TX/PRO/DX INJ NEW DRUG ADDON: CPT

## 2022-07-19 PROCEDURE — 96374 THER/PROPH/DIAG INJ IV PUSH: CPT

## 2022-07-19 PROCEDURE — 99285 EMERGENCY DEPT VISIT HI MDM: CPT

## 2022-07-19 PROCEDURE — 83735 ASSAY OF MAGNESIUM: CPT

## 2022-07-19 PROCEDURE — 71045 X-RAY EXAM CHEST 1 VIEW: CPT

## 2022-07-19 PROCEDURE — 84484 ASSAY OF TROPONIN QUANT: CPT

## 2022-07-19 PROCEDURE — 93005 ELECTROCARDIOGRAM TRACING: CPT | Performed by: NURSE PRACTITIONER

## 2022-07-19 RX ORDER — PROCHLORPERAZINE EDISYLATE 5 MG/ML
10 INJECTION INTRAMUSCULAR; INTRAVENOUS ONCE
Status: COMPLETED | OUTPATIENT
Start: 2022-07-19 | End: 2022-07-19

## 2022-07-19 RX ORDER — DIPHENHYDRAMINE HYDROCHLORIDE 50 MG/ML
25 INJECTION INTRAMUSCULAR; INTRAVENOUS ONCE
Status: COMPLETED | OUTPATIENT
Start: 2022-07-19 | End: 2022-07-19

## 2022-07-19 RX ORDER — MECLIZINE HCL 12.5 MG/1
25 TABLET ORAL ONCE
Status: DISCONTINUED | OUTPATIENT
Start: 2022-07-19 | End: 2022-07-19

## 2022-07-19 RX ORDER — 0.9 % SODIUM CHLORIDE 0.9 %
1000 INTRAVENOUS SOLUTION INTRAVENOUS ONCE
Status: COMPLETED | OUTPATIENT
Start: 2022-07-19 | End: 2022-07-19

## 2022-07-19 RX ADMIN — PROCHLORPERAZINE EDISYLATE 10 MG: 5 INJECTION INTRAMUSCULAR; INTRAVENOUS at 04:28

## 2022-07-19 RX ADMIN — SODIUM CHLORIDE 1000 ML: 9 INJECTION, SOLUTION INTRAVENOUS at 04:27

## 2022-07-19 RX ADMIN — DIPHENHYDRAMINE HYDROCHLORIDE 25 MG: 50 INJECTION, SOLUTION INTRAMUSCULAR; INTRAVENOUS at 04:28

## 2022-07-19 NOTE — ED NOTES
Department of Emergency Medicine  FIRST PROVIDER TRIAGE NOTE             Independent MLP           7/19/22  2:45 AM EDT    Date of Encounter: 7/19/22   MRN: [id number]      HPI: Romel Vasquez is a 28 y.o. male who presents to the ED for Other (Describing all symptoms of covid, but denies concern for covid. Pt just keeps listing more complaints )  Patient presents emergency department with multiple chronic issues states for months he has had sinus issues states that he used to drink alcohol he does not and thought that after drinking alcohol he would feel better he reports that he feels that he is weak and he went golfing couple weeks ago and he did not do very well and states that he just feels that his coordination is off. He also reports having a headache. He reports constant pain and pressure and only felt better when he was on Augmentin and since its been done with for his sinusitis he once again has multiple symptoms. Patient is going to see neurology in 2 weeks. Patient also reports at times palpitations and at times feels his heart races and then slows down. Patient also reports feeling cognitively slow. ROS: Negative for cp or sob. PE: Gen Appearance/Constitutional: alert  HEENT: NC/NT. PERRLA,  Airway patent. Initial Plan of Care: All treatment areas with department are currently occupied. Plan to order/Initiate the following while awaiting opening in ED: labs, EKG, and imaging studies.   Initiate Treatment-Testing, Proceed toTreatment Area When Bed Available for ED Attending/MLP to Continue Care    Electronically signed by ELLEN Mendoza CNP   DD: 7/19/22       ELLEN Mendoza CNP  07/19/22 0247

## 2022-07-19 NOTE — ED PROVIDER NOTES
about 15.0 standard drinks per week. He reports that he does not currently use drugs after having used the following drugs: Cocaine. Frequency: 2.00 times per week. Family History: family history includes High Cholesterol in his father. The patients home medications have been reviewed. Allergies: Cefazolin and Cefprozil        ---------------------------------------------------PHYSICAL EXAM--------------------------------------    Constitutional/General: AAO to person/place/time/purpose, NAD, no labored breathing  Head: Normocephalic and atraumatic  Eyes: EOMI, PERRL, conjunctiva normal, sclera non icteric  Mouth: Moist mucous membranes, uvula midline  Neck: Supple, no stridor, no meningeal signs  Respiratory: Lungs clear to auscultation bilaterally, no wheezes, rales, or rhonchi. Not in respiratory distress  Cardiovascular:  Regular rate. Regular rhythm. No murmurs, no gallops, or rubs. 2+ distal pulses. Equal extremity pulses. Chest: No chest wall tenderness or deformity  GI:  Abdomen Soft, Non tender, Non distended. No rebound, guarding, or rigidity. No pulsatile masses. Musculoskeletal: Moves all extremities x 4. Warm and well perfused, no clubbing, cyanosis, or edema. Capillary refill <3 seconds  Integument: skin warm and dry. No rashes. Neurologic: GCS 15, no focal deficits, symmetric strength 5/5 in the major muscle groups of upper and lower extremities bilaterally. Sensation intact to light touch throughout dermatomes of bilateral upper and lower extremity. Normal heel-to-shin and finger-nose testing bilaterally, no ataxia. Psychiatric: Mildly anxious appearing, mildly pressured speech, linear thought processes, no signs of internal preoccupation    -------------------------------------------------- RESULTS -------------------------------------------------  I have personally reviewed all laboratory and imaging results for this patient. Results are listed below.      LABS:  Results for orders placed or performed during the hospital encounter of 07/19/22   Troponin   Result Value Ref Range    Troponin, High Sensitivity <6 0 - 11 ng/L   CK   Result Value Ref Range    Total  20 - 200 U/L   CBC with Auto Differential   Result Value Ref Range    WBC 6.9 4.5 - 11.5 E9/L    RBC 4.58 3.80 - 5.80 E12/L    Hemoglobin 13.9 12.5 - 16.5 g/dL    Hematocrit 40.0 37.0 - 54.0 %    MCV 87.3 80.0 - 99.9 fL    MCH 30.3 26.0 - 35.0 pg    MCHC 34.8 (H) 32.0 - 34.5 %    RDW 11.8 11.5 - 15.0 fL    Platelets 355 565 - 399 E9/L    MPV 10.3 7.0 - 12.0 fL    Neutrophils % 63.4 43.0 - 80.0 %    Immature Granulocytes % 0.3 0.0 - 5.0 %    Lymphocytes % 29.0 20.0 - 42.0 %    Monocytes % 6.8 2.0 - 12.0 %    Eosinophils % 0.4 0.0 - 6.0 %    Basophils % 0.1 0.0 - 2.0 %    Neutrophils Absolute 4.35 1.80 - 7.30 E9/L    Immature Granulocytes # 0.02 E9/L    Lymphocytes Absolute 1.99 1.50 - 4.00 E9/L    Monocytes Absolute 0.47 0.10 - 0.95 E9/L    Eosinophils Absolute 0.03 (L) 0.05 - 0.50 E9/L    Basophils Absolute 0.01 0.00 - 0.20 E9/L   Comprehensive Metabolic Panel   Result Value Ref Range    Sodium 136 132 - 146 mmol/L    Potassium 4.2 3.5 - 5.0 mmol/L    Chloride 100 98 - 107 mmol/L    CO2 25 22 - 29 mmol/L    Anion Gap 11 7 - 16 mmol/L    Glucose 93 74 - 99 mg/dL    BUN 8 6 - 20 mg/dL    CREATININE 0.8 0.7 - 1.2 mg/dL    GFR Non-African American >60 >=60 mL/min/1.73    GFR African American >60     Calcium 9.9 8.6 - 10.2 mg/dL    Total Protein 8.1 6.4 - 8.3 g/dL    Albumin 5.3 (H) 3.5 - 5.2 g/dL    Total Bilirubin 1.0 0.0 - 1.2 mg/dL    Alkaline Phosphatase 116 40 - 129 U/L    ALT 52 (H) 0 - 40 U/L    AST 32 0 - 39 U/L   Magnesium   Result Value Ref Range    Magnesium 1.8 1.6 - 2.6 mg/dL       RADIOLOGY:  Interpreted by Radiologist unless otherwise specified  CT HEAD WO CONTRAST   Final Result   No acute intracranial abnormality. XR CHEST PORTABLE   Final Result   No acute process. ------------------------- NURSING NOTES AND VITALS REVIEWED ---------------------------   The nursing notes within the ED encounter and vital signs as below have been reviewed by myself. BP (!) 150/98   Pulse 100   Temp 98.2 °F (36.8 °C)   Resp 18   Wt 149 lb (67.6 kg)   SpO2 99%   BMI 24.79 kg/m²   Oxygen Saturation Interpretation: Normal    The patients available past medical records and past encounters were reviewed. ------------------------------ ED COURSE/MEDICAL DECISION MAKING----------------------  Medications   0.9 % sodium chloride bolus (0 mLs IntraVENous Stopped 7/19/22 0500)   prochlorperazine (COMPAZINE) injection 10 mg (10 mg IntraVENous Given 7/19/22 0428)   diphenhydrAMINE (BENADRYL) injection 25 mg (25 mg IntraVENous Given 7/19/22 0428)            Medical Decision Making: This is a 52-year-old male presents emerged department for multiple complaints. Patient complaining of a posterior occipital headache, intermittent issues with short-term memory, word finding as well as several other complaints. Broad work-up obtained, troponin negative, electrolytes within normal limits, kidney function within normal limits, no significant anemia or leukocytosis. CT imaging obtained of the head without acute intracranial process. Patient here with no focal neurodeficits, no ataxia, full strength and intact sensation. He is mildly hypertensive, afebrile, initially mildly tachycardic improving after treatment. Patient given Compazine, Benadryl and IV fluids for headache with resolution. Chest x-ray without acute intrathoracic process, no evidence of pneumonia. CK normal, no evidence of rhabdomyolysis. Given patient's reassuring work-up, exam, patient will be discharged home with outpatient follow-up and strict return precautions. Patient does follow with multiple specialist, advised to follow-up with PCP and specialists as recommended by his PCP.     See ED COURSE for additional MDM. Labs & imaging reviewed and interpreted, see RESULTS above. Re-Evaluations: This patient's ED course included: a personal history and physicial examination, re-evaluation prior to disposition, multiple bedside re-evaluations, and IV medications    This patient has remained hemodynamically stable during their ED course. Consultations:  See ED Course    Counseling: The emergency provider has spoken with the patient and family member patient and mother and discussed todays results, in addition to providing specific details for the plan of care and counseling regarding the diagnosis and prognosis. Questions are answered at this time and they are agreeable with the plan.       --------------------------------- IMPRESSION AND DISPOSITION ---------------------------------    IMPRESSION  1. Recurrent occipital headache    2. Paresthesia    3. Intermittent vertigo        DISPOSITION  Disposition: Discharge to home  Patient condition is stable    NOTE: This report was transcribed using voice recognition software. Every effort was made to ensure accuracy; however, inadvertent computerized transcription errors may be present. Also please note that patient was seen and examined by attending physician. Plan of care and disposition discussed with attending physician and they were immediately available or present for all procedures performed.        -- Dayne Hilario D.O. PGY-3     Resident Physician     Emergency Medicine      7/19/2022 5:28 AM         [address] DO  Resident  07/19/22 4909

## 2022-07-21 ENCOUNTER — TELEPHONE (OUTPATIENT)
Dept: ENT CLINIC | Age: 32
End: 2022-07-21

## 2022-07-21 NOTE — TELEPHONE ENCOUNTER
Patient called in stating he is scheduled for sinus surgery in sept but his face, left eye, and glands are swollen and painful. States Dr. Harper Moore previously put him on augmentin and it helped with his symptoms and wanting to know if something else can be prescribed to help such as more augmentin or an antiinflammatory.  Please advise    Electronically signed by Corin Duenas MA on 7/21/22 at 1:17 PM EDT

## 2022-07-21 NOTE — TELEPHONE ENCOUNTER
Called and advised patient surgery is September patient states face,left eye and body aches swollen,painful, no fever. Patient was placed on antibiotics showing relief few weeks ago by provider. Will correspond with Dr. Nguyen Layne 7/22/2022 and call patient back. Patient would like to be placed on another antibiotic.

## 2022-07-22 RX ORDER — AMOXICILLIN AND CLAVULANATE POTASSIUM 875; 125 MG/1; MG/1
1 TABLET, FILM COATED ORAL 2 TIMES DAILY
Qty: 28 TABLET | Refills: 0 | Status: SHIPPED | OUTPATIENT
Start: 2022-07-22 | End: 2022-08-05

## 2022-08-02 ENCOUNTER — OFFICE VISIT (OUTPATIENT)
Dept: NEUROLOGY | Age: 32
End: 2022-08-02
Payer: MEDICARE

## 2022-08-02 VITALS
SYSTOLIC BLOOD PRESSURE: 143 MMHG | WEIGHT: 149 LBS | OXYGEN SATURATION: 97 % | TEMPERATURE: 99.4 F | BODY MASS INDEX: 24.83 KG/M2 | DIASTOLIC BLOOD PRESSURE: 95 MMHG | HEART RATE: 88 BPM | HEIGHT: 65 IN

## 2022-08-02 DIAGNOSIS — R41.0 CONFUSION: ICD-10-CM

## 2022-08-02 DIAGNOSIS — R20.0 NUMBNESS AND TINGLING: ICD-10-CM

## 2022-08-02 DIAGNOSIS — R20.2 NUMBNESS AND TINGLING: ICD-10-CM

## 2022-08-02 DIAGNOSIS — M62.838 MUSCLE SPASM: ICD-10-CM

## 2022-08-02 DIAGNOSIS — R51.9 NONINTRACTABLE HEADACHE, UNSPECIFIED CHRONICITY PATTERN, UNSPECIFIED HEADACHE TYPE: ICD-10-CM

## 2022-08-02 DIAGNOSIS — R51.9 NONINTRACTABLE HEADACHE, UNSPECIFIED CHRONICITY PATTERN, UNSPECIFIED HEADACHE TYPE: Primary | ICD-10-CM

## 2022-08-02 LAB
C-REACTIVE PROTEIN: 0.3 MG/DL (ref 0–0.4)
FOLATE: 10.8 NG/ML (ref 4.8–24.2)
LACTATE DEHYDROGENASE: 147 U/L (ref 135–225)
RHEUMATOID FACTOR: <10 IU/ML (ref 0–13)
SEDIMENTATION RATE, ERYTHROCYTE: 5 MM/HR (ref 0–15)
TOTAL CK: 120 U/L (ref 20–200)
TSH SERPL DL<=0.05 MIU/L-ACNC: 1.32 UIU/ML (ref 0.27–4.2)
VITAMIN B-12: 340 PG/ML (ref 211–946)

## 2022-08-02 PROCEDURE — 4004F PT TOBACCO SCREEN RCVD TLK: CPT | Performed by: PHYSICIAN ASSISTANT

## 2022-08-02 PROCEDURE — G8427 DOCREV CUR MEDS BY ELIG CLIN: HCPCS | Performed by: PHYSICIAN ASSISTANT

## 2022-08-02 PROCEDURE — G8420 CALC BMI NORM PARAMETERS: HCPCS | Performed by: PHYSICIAN ASSISTANT

## 2022-08-02 PROCEDURE — 99205 OFFICE O/P NEW HI 60 MIN: CPT | Performed by: PHYSICIAN ASSISTANT

## 2022-08-02 RX ORDER — BACLOFEN 10 MG/1
TABLET ORAL
COMMUNITY
Start: 2022-07-22 | End: 2022-09-20 | Stop reason: ALTCHOICE

## 2022-08-02 NOTE — PROGRESS NOTES
would rather not take anything unless he has to. He has been tested for Lyme and this was negative. So far routine labs, B12, TSH, CK over the last year have been normal.     Has a history of alcohol abuse and cocaine use- though none currently. Did have an alcohol withdrawal seizure in the past.     He is requesting a spinal tap be completed. He also has an appointment with [facility] neurology next month, but would like testing completed before going there. Past Medical History:     Past Medical History:   Diagnosis Date    Acid reflux     For EGD 5-3-22    Alcohol related seizure ([address] 12/20/2021    Cephalgia 12/20/2021    Erectile dysfunction     History of alcohol use disorder 12/20/2021    Nausea        Past Surgical History:       Past Surgical History:   Procedure Laterality Date    CYSTOSCOPY  2008    UPPER GASTROINTESTINAL ENDOSCOPY N/A 5/3/2022    EGD ESOPHAGOGASTRODUODENOSCOPY performed by Dolores Nelson MD at [facility] ENDOSCOPY       Allergies:       Cefazolin and Cefprozil    Medications:     Prior to Admission medications    Medication Sig Start Date End Date Taking? Authorizing Provider   baclofen (LIORESAL) 10 MG tablet TAKE 1 TABLET BY MOUTH EVERY 8 HOURS 7/22/22  Yes Historical Provider, MD   amoxicillin-clavulanate (AUGMENTIN) 875-125 MG per tablet Take 1 tablet by mouth in the morning and 1 tablet before bedtime. Do all this for 14 days. 7/22/22 8/5/22 Yes Angy Cardenas, DO   fluticasone (FLONASE) 50 MCG/ACT nasal spray 1 spray by Each Nostril route daily   Yes Historical Provider, MD   azelastine (ASTELIN) 0.1 % nasal spray 1 spray by Nasal route 2 times daily Use in each nostril as directed 5/31/22  Yes ELLEN Carrillo CNP   tadalafil (CIALIS) 20 MG tablet TAKE 1 TABLET BY MOUTH EVERY DAY SEXUAL ACTIVITY AND 30 MINUTES BEFORE SEXUAL ACTIVITY AS NEEDED.  DO NOT USE MORE THAN 1 DOSE PER 24 HOURS 4/28/22  Yes Historical Provider, MD       Social History:        reports that he has JENY  2:26 PM  8/2/2022    I spent 60 minutes with this patient obtaining the HPI and discussing the exam with greater than 50% of the time providing counseling and education on medications and other treatment plans. All questions were answered prior to leaving my office.

## 2022-08-03 LAB
ENA TO SSA (RO) ANTIBODY: NEGATIVE
ENA TO SSB (LA) ANTIBODY: NEGATIVE

## 2022-08-04 LAB — ANTI-NUCLEAR ANTIBODY (ANA): NEGATIVE

## 2022-08-05 ENCOUNTER — TELEPHONE (OUTPATIENT)
Dept: ENT CLINIC | Age: 32
End: 2022-08-05

## 2022-08-05 DIAGNOSIS — R20.0 NUMBNESS AND TINGLING: Primary | ICD-10-CM

## 2022-08-05 DIAGNOSIS — R20.2 NUMBNESS AND TINGLING: Primary | ICD-10-CM

## 2022-08-05 DIAGNOSIS — R51.9 NONINTRACTABLE HEADACHE, UNSPECIFIED CHRONICITY PATTERN, UNSPECIFIED HEADACHE TYPE: ICD-10-CM

## 2022-08-05 LAB — ALDOLASE: 3.4 U/L (ref 1.2–7.6)

## 2022-08-05 NOTE — TELEPHONE ENCOUNTER
Prior Authorization Form:      DEMOGRAPHICS:                     Patient Name:  Kayli Mahoney  Patient :  1990            Insurance:  Payor: Edna Ramos / Plan: [employer] / Product Type: *No Product type* /   Insurance ID Number:    Payer/Plan Subscr  Sex Relation Sub. Ins. ID Effective Group Num   1.  [employer]ate* 1990 Male Self [id number] 21 [id number]                                   [address]         DIAGNOSIS & PROCEDURE:                       Procedure/Operation: FESS,Septoplasty SMRITS            CPT Code: [id number]    Diagnosis:  chronic sinusitis    ICD10 Code: J32.0    Location:  [facility]    Surgeon:  jose    SCHEDULING INFORMATION:                          Date: 22    Time: n/a              Anesthesia:  General                                                       Status:  Outpatient        Special Comments:  include all chart notes        Electronically signed by Nay Pennington MA on 2022 at 12:56 PM

## 2022-08-17 LAB
Lab: NORMAL
REPORT: NORMAL
THIS TEST SENT TO: NORMAL

## 2022-09-07 ENCOUNTER — HOSPITAL ENCOUNTER (OUTPATIENT)
Dept: NEUROLOGY | Age: 32
Discharge: HOME | End: 2022-09-07
Payer: MEDICARE

## 2022-09-07 PROCEDURE — 95912 NRV CNDJ TEST 11-12 STUDIES: CPT | Performed by: PHYSICAL MEDICINE & REHABILITATION

## 2022-09-07 PROCEDURE — 95886 MUSC TEST DONE W/N TEST COMP: CPT | Performed by: PHYSICAL MEDICINE & REHABILITATION

## 2022-09-07 PROCEDURE — 95912 NRV CNDJ TEST 11-12 STUDIES: CPT

## 2022-09-07 PROCEDURE — 95886 MUSC TEST DONE W/N TEST COMP: CPT

## 2022-09-07 NOTE — PROCEDURES
[address] [facility]  [address] [place] [address], [address] [place] [address]  Phone: [phone]  Fax: [phone]      Referring Provider: RADU Luna  Primary Care Physician: Kimo Fernandez III, DO  Patient Name: Ginny Natarajan  Patient YOB: 1990  Gender: male  BMI: 24.79  149lbs  5'5\"    9/7/2022    Description of clinical problem:   CC: muscle cramps, weakness, numbness    Pain No, Numbness/tingling  Yes; Weakness  Yes       Brief physical exam:   Sensory deficit No; Weakness No; Atrophy  No; Reflex abnormality No    Motor NCS      Nerve / Sites Lat. Amplitude Distance Velocity Temp.    ms mV cm m/s °C   R Median - APB      Wrist 3.33 7.0 8  32.1      Elbow 6.88 6.4 18 51 32   R Ulnar - ADM      Wrist 3.13 12.7 8  32.7      B. Elbow 5.36 11.0 15 67 31.9      A. Elbow 7.19 10.9 10 55 32.1   R Peroneal - EDB      Ankle 3.85 11.5 8  32.6      Pop fossa 11.67 10.7 35 45 32.7   R Tibial - AH      Ankle 2.86 13.6 8  33.3      Pop fossa 11.72 10.0 39 44 33       Sensory NCS      Nerve / Sites Peak Lat PP Amp Distance Velocity Temp. ms µV cm m/s °C   R Median - Digit II (Antidromic)      Mid Palm 1.72 55.2 7 64 32.7      Wrist 3.33 57.6 14 55 32.9   R Ulnar - Digit V (Antidromic)      Wrist 3.28 40.8 14 54 32.2   R Radial - Anatomical snuff box (Forearm)      Forearm 2.29 26.0 10 58 32.5   R Superficial peroneal - Ankle      Lat leg 2.81 27.3 10 45 33.3   R Sural - Ankle (Calf)      Calf 3.59 20.3 14 50 33.3       F  Wave      Nerve F Lat M Lat F-M Lat    ms ms ms   R Peroneal - EDB 45.8 3.9 41.9   R Tibial - AH 47.4 4.0 43.4   R Median - APB 26.3 3.6 22.7   R Ulnar - ADM 24.8 2.5 22.3       H Reflex      Nerve Lat Hmax    ms   R Tibial - Soleus 31.0   L Tibial - Soleus 30.2       EMG         EMG Summary Table     Spontaneous MUAP Recruitment   Muscle IA Fib PSW Fasc H.F. Amp Dur. PPP Pattern   R. Tibialis anterior N None None None None N N N N   R.  Extensor hallucis longus N None None None None N N N N   R. Gastrocnemius (Medial head) N None None None None N N N N   R. Vastus medialis N None None None None N N N N   R. Rectus femoris N None None None None N N N N   R. Biceps brachii N None None None None N N N N   R. Triceps brachii N None None None None N N N N   R. Pronator teres N None None None None N N N N   R. First dorsal interosseous N None None None None N N N N   R. Abductor pollicis brevis N None None None None N N N N         Study Limitations:  none     Summary of Findings:    1. Sensory nerve conduction studies of all nerves tested showed normal peak latencies, normal SNAP amplitudes, and normal conduction velocities. 2. Motor nerve conduction studies of all nerves tested showed normal distal latencies, normal CMAP amplitudes, and normal conduction velocities. 3. F-wave studies of all nerves tested revealed normal latencies. 4. Bilateral tibial nerve H-reflex responses were normal.     5. EMG was performed with monopolar needle in multiple muscles outlined above. All muscles tested revealed normal insertional activity, without evidence of abnormal spontaneous activity. All MUAP's were of normal amplitude and duration with a full recruitment pattern. No increase in polyphasic potentials was noted. Diagnostic Interpretation: This study was normal.     Previous Study: none       Follow up EMG is recommended if clinically indicated. Technologist: Jin Christianson    Physician: Elder Aguilar DO, [facility]   Board Certified Physical Medicine and Rehabilitation      Nerve conduction studies and electromyography were performed according to our laboratory policies and procedures which can be provided upon request. All abnormal values are identified in the table.  Laboratory normal values can also be provided upon request.       Cc: RADU Oliver, RACIEL, DO

## 2022-09-19 ENCOUNTER — TELEPHONE (OUTPATIENT)
Dept: GENERAL RADIOLOGY | Age: 32
End: 2022-09-19

## 2022-09-19 NOTE — TELEPHONE ENCOUNTER
Patient called and cancelled 9-19-22 appointment for lumbar puncture stating he was called into work and was notified over the weekend. He will call back to reschedule.   Electronically signed by Anabella Luevano on 9/19/2022 at 8:38 AM

## 2022-09-20 NOTE — PROGRESS NOTES
[facility] PRE-ADMISSION TESTING INSTRUCTIONS    The Preadmission Testing patient is instructed accordingly using the following criteria (check applicable):    ARRIVAL INSTRUCTIONS:  [x] Parking the day of Surgery is located in the Main Entrance lot. Upon entering the door, make an immediate right to the surgery reception desk    [x] Bring photo ID and insurance card    [] Bring in a copy of Living will or Durable Power of  papers. [x] Please be sure to arrange for responsible adult to provide transportation to and from the hospital    [x] Please arrange for responsible adult to be with you for the 24 hour period post procedure due to having anesthesia    [x] If you awake am of surgery not feeling well or have temperature >100 please call [phone]    GENERAL INSTRUCTIONS:    [x] Nothing by mouth after midnight, including gum, candy, mints or water    [x] You may brush your teeth, but do not swallow any water    [] Take medications as instructed with 1-2 oz of water    [] Stop herbal supplements and vitamins 5 days prior to procedure    [] Follow preop dosing of blood thinners per physician instructions    [] Take 1/2 dose of evening insulin, but no insulin after midnight    [] No oral diabetic medications after midnight    [] If diabetic and have low blood sugar or feel symptomatic, take 1-2oz apple juice only    [] Bring inhalers day of surgery    [] Bring C-PAP/ Bi-Pap day of surgery    [] Bring urine specimen day of surgery    [x] Shower or bath with soap, lather and rinse well, AM of Surgery, no lotion, powders or creams to surgical site    [] Follow bowel prep as instructed per surgeon    [x] No tobacco products within 24 hours of surgery     [x] No alcohol or illegal drug use within 24 hours of surgery.     [x] Jewelry, body piercing's, eyeglasses, contact lenses and dentures are not permitted into surgery (bring cases)      [] Please do not wear any nail polish, make

## 2022-09-25 NOTE — H&P
Patient ID:  Cassy Villegas is a 28 y.o. male. HPI:     Pt returns for review of CT Sinus. There are not changes since last visit. Pt still having pain issues with swallowing as well.       Pt has been on fluticasone (Flonase) for 1 month(s) and is doing adequately        Past Medical History        Past Medical History:   Diagnosis Date    Acid reflux       For EGD 5-3-22    Alcohol related seizure ([address] 12/20/2021    Cephalgia 12/20/2021    Erectile dysfunction      History of alcohol use disorder 12/20/2021    Nausea           Past Surgical History         Past Surgical History:   Procedure Laterality Date    CYSTOSCOPY   2008    UPPER GASTROINTESTINAL ENDOSCOPY N/A 5/3/2022     EGD ESOPHAGOGASTRODUODENOSCOPY performed by Meche Serna MD at [place] [address]. History         Family History   Problem Relation Age of Onset    High Cholesterol Father           Social History   Social History            Socioeconomic History    Marital status: Single       Spouse name: None    Number of children: None    Years of education: None    Highest education level: None   Occupational History    None   Tobacco Use    Smoking status: Current Every Day Smoker       Packs/day: 0.25       Types: Cigarettes    Smokeless tobacco: Never Used   Vaping Use    Vaping Use: Some days    Substances: Nicotine, Flavoring    Passive vaping exposure: Yes   Substance and Sexual Activity    Alcohol use: Not Currently       Alcohol/week: 15.0 standard drinks       Types: 15 Cans of beer per week       Comment: stopped 1/16/22    Drug use: Not Currently       Frequency: 2.0 times per week       Types: Cocaine       Comment: states he quit cocaine last 4/2021    Sexual activity: None   Other Topics Concern    None   Social History Narrative    None      Social Determinants of Health          Financial Resource Strain:     Difficulty of Paying Living Expenses: Not on file   Food Insecurity:     Worried About Running Out of Food in the Last Year: Not on file    Ran Out of Food in the Last Year: Not on file   Transportation Needs:     Lack of Transportation (Medical): Not on file    Lack of Transportation (Non-Medical): Not on file   Physical Activity:     Days of Exercise per Week: Not on file    Minutes of Exercise per Session: Not on file   Stress:     Feeling of Stress : Not on file   Social Connections:     Frequency of Communication with Friends and Family: Not on file    Frequency of Social Gatherings with Friends and Family: Not on file    Attends Muslim Services: Not on file    Active Member of Clubs or Organizations: Not on file    Attends Club or Organization Meetings: Not on file    Marital Status: Not on file   Intimate Partner Violence:     Fear of Current or Ex-Partner: Not on file    Emotionally Abused: Not on file    Physically Abused: Not on file    Sexually Abused: Not on file   Housing Stability:     Unable to Pay for Housing in the Last Year: Not on file    Number of [place] in the Last Year: Not on file    Unstable Housing in the Last Year: Not on file              Allergies   Allergen Reactions    Cefazolin Rash    Cefprozil Rash         Review of Systems   Constitutional: Negative for chills and fever. HENT: Positive for congestion, postnasal drip and sinus pressure. Negative for rhinorrhea, sneezing and sore throat. Eyes: Negative for pain and discharge. Respiratory: Negative for cough and shortness of breath. Cardiovascular: Negative for chest pain. Gastrointestinal: Negative for diarrhea and vomiting. Genitourinary: Negative for flank pain. Musculoskeletal: Negative for back pain and neck pain. Skin: Negative for rash. Allergic/Immunologic: Negative. Neurological: Negative for syncope and headaches. All other systems reviewed and are negative.                  Objective:          Vitals:     06/29/22 1001   BP: 125/82   Pulse: 61      Physical Exam  Constitutional: Appearance: Normal appearance. He is normal weight. HENT:      Head: Normocephalic and atraumatic. Jaw: Tenderness and pain on movement present. Right Ear: Tympanic membrane, ear canal and external ear normal.      Left Ear: Tympanic membrane, ear canal and external ear normal.      Nose: Septal deviation present. Mouth/Throat:      Lips: Pink. Mouth: Mucous membranes are moist.      Pharynx: Oropharynx is clear. Comments: Dysphagia  Eyes:      General: Lids are normal.      Conjunctiva/sclera: Conjunctivae normal.      Pupils: Pupils are equal, round, and reactive to light. Cardiovascular:      Rate and Rhythm: Normal rate and regular rhythm. Pulses: Normal pulses. Heart sounds: Normal heart sounds. Pulmonary:      Effort: Pulmonary effort is normal.      Breath sounds: No stridor. No wheezing. Abdominal:      General: Abdomen is flat. Palpations: Abdomen is soft. Musculoskeletal:         General: No signs of injury. Normal range of motion. Cervical back: Normal range of motion and neck supple. Skin:     General: Skin is warm and dry. Neurological:      Mental Status: He is alert and oriented to person, place, and time. Psychiatric:         Attention and Perception: Attention and perception normal.         Mood and Affect: Mood normal.                  CT sinuses (my review)     no - keagan cells -   no - meenu bullosa -   yes - Enlarged inferior turbinates - bilateral   yes - Obstructed OMC -bilateral   yes - maxillary sinus disease -left, mild  no - frontal sinus disease- ,   Left frontal sinus              Absent -  no              Size - Moderate  Right frontal sinus              Absent -  no              Size - Moderate  yes - sphenoid sinus disease - right,severe  yes - Deviated nasal septum - left,  severe   yes - Septal Spur - left,severe   no - posterior accessory os -,        CT sinus report:  Impression   1.   Chronic right sphenoid sinusitis cartilage. Cartilage has a memory and sometimes over the course of hours will bend back to the preoperative position. -- Septal Hematoma: This can occur if a drainage hole was not created in the septum. (Note: most surgeons have the problem of too many holes created during surgery, not too few). The cartilage has no blood supply and receives its nutrients from the overlying mucosa. A septal hematoma elevates the mucosal flaps off of the cartilage, resulting in cartilage death and usually infection. If the entire septum is lost the nose may collapse creating a saddle deformity. -- Hole in the nasal septum. This occurs where there are two opposing holes in the nasal septal flaps. A hole in the nasal septum may result in disturbing crusting, bleeding and whistling.   -- Saddle Nose: If too much supporting cartilage is removed, the mid-portion of the nose may sag creating a \"Saddle Nose\" deformity.             Follow up 1 week after surgery

## 2022-09-26 ENCOUNTER — ANESTHESIA EVENT (OUTPATIENT)
Dept: OPERATING ROOM | Age: 32
End: 2022-09-26
Payer: MEDICARE

## 2022-09-26 ENCOUNTER — HOSPITAL ENCOUNTER (OUTPATIENT)
Age: 32
Setting detail: OUTPATIENT SURGERY
Discharge: HOME | End: 2022-09-26
Attending: OTOLARYNGOLOGY | Admitting: OTOLARYNGOLOGY
Payer: MEDICARE

## 2022-09-26 ENCOUNTER — ANESTHESIA (OUTPATIENT)
Dept: OPERATING ROOM | Age: 32
End: 2022-09-26
Payer: MEDICARE

## 2022-09-26 VITALS
HEART RATE: 89 BPM | DIASTOLIC BLOOD PRESSURE: 83 MMHG | RESPIRATION RATE: 16 BRPM | WEIGHT: 148 LBS | HEIGHT: 65 IN | TEMPERATURE: 97.3 F | SYSTOLIC BLOOD PRESSURE: 141 MMHG | OXYGEN SATURATION: 99 % | BODY MASS INDEX: 24.66 KG/M2

## 2022-09-26 DIAGNOSIS — J32.0 CHRONIC MAXILLARY SINUSITIS: ICD-10-CM

## 2022-09-26 DIAGNOSIS — G89.18 POST-OP PAIN: Primary | ICD-10-CM

## 2022-09-26 PROCEDURE — 2500000003 HC RX 250 WO HCPCS

## 2022-09-26 PROCEDURE — C1726 CATH, BAL DIL, NON-VASCULAR: HCPCS | Performed by: OTOLARYNGOLOGY

## 2022-09-26 PROCEDURE — 2709999900 HC NON-CHARGEABLE SUPPLY: Performed by: OTOLARYNGOLOGY

## 2022-09-26 PROCEDURE — 3700000001 HC ADD 15 MINUTES (ANESTHESIA): Performed by: OTOLARYNGOLOGY

## 2022-09-26 PROCEDURE — 2580000003 HC RX 258

## 2022-09-26 PROCEDURE — 31295 NSL/SINS NDSC SURG MAX SINS: CPT | Performed by: OTOLARYNGOLOGY

## 2022-09-26 PROCEDURE — 30802 ABLATE INF TURBINATE SUBMUC: CPT | Performed by: OTOLARYNGOLOGY

## 2022-09-26 PROCEDURE — 2500000003 HC RX 250 WO HCPCS: Performed by: OTOLARYNGOLOGY

## 2022-09-26 PROCEDURE — 30520 REPAIR OF NASAL SEPTUM: CPT | Performed by: OTOLARYNGOLOGY

## 2022-09-26 PROCEDURE — 7100000010 HC PHASE II RECOVERY - FIRST 15 MIN: Performed by: OTOLARYNGOLOGY

## 2022-09-26 PROCEDURE — 3600000013 HC SURGERY LEVEL 3 ADDTL 15MIN: Performed by: OTOLARYNGOLOGY

## 2022-09-26 PROCEDURE — 7100000011 HC PHASE II RECOVERY - ADDTL 15 MIN: Performed by: OTOLARYNGOLOGY

## 2022-09-26 PROCEDURE — 6360000002 HC RX W HCPCS

## 2022-09-26 PROCEDURE — 31298 NSL/SINS NDSC SURG FRNT&SPHN: CPT | Performed by: OTOLARYNGOLOGY

## 2022-09-26 PROCEDURE — 3600000003 HC SURGERY LEVEL 3 BASE: Performed by: OTOLARYNGOLOGY

## 2022-09-26 PROCEDURE — 2720000010 HC SURG SUPPLY STERILE: Performed by: OTOLARYNGOLOGY

## 2022-09-26 PROCEDURE — 3700000000 HC ANESTHESIA ATTENDED CARE: Performed by: OTOLARYNGOLOGY

## 2022-09-26 PROCEDURE — C2625 STENT, NON-COR, TEM W/DEL SY: HCPCS | Performed by: OTOLARYNGOLOGY

## 2022-09-26 PROCEDURE — 80307 DRUG TEST PRSMV CHEM ANLYZR: CPT

## 2022-09-26 PROCEDURE — 88305 TISSUE EXAM BY PATHOLOGIST: CPT

## 2022-09-26 PROCEDURE — C1894 INTRO/SHEATH, NON-LASER: HCPCS | Performed by: OTOLARYNGOLOGY

## 2022-09-26 PROCEDURE — 7100000001 HC PACU RECOVERY - ADDTL 15 MIN: Performed by: OTOLARYNGOLOGY

## 2022-09-26 PROCEDURE — 7100000000 HC PACU RECOVERY - FIRST 15 MIN: Performed by: OTOLARYNGOLOGY

## 2022-09-26 PROCEDURE — 6370000000 HC RX 637 (ALT 250 FOR IP): Performed by: OTOLARYNGOLOGY

## 2022-09-26 DEVICE — PROPEL MINI SINUS IMPLANT
Type: IMPLANTABLE DEVICE | Site: NOSE | Status: FUNCTIONAL
Brand: PROPEL MINI

## 2022-09-26 DEVICE — PROPEL CONTOUR SINUS IMPLANT
Type: IMPLANTABLE DEVICE | Site: NOSE | Status: FUNCTIONAL
Brand: PROPEL CONTOUR

## 2022-09-26 RX ORDER — ROCURONIUM BROMIDE 10 MG/ML
INJECTION, SOLUTION INTRAVENOUS PRN
Status: DISCONTINUED | OUTPATIENT
Start: 2022-09-26 | End: 2022-09-26 | Stop reason: SDUPTHER

## 2022-09-26 RX ORDER — SODIUM CHLORIDE 0.9 % (FLUSH) 0.9 %
5-40 SYRINGE (ML) INJECTION PRN
Status: DISCONTINUED | OUTPATIENT
Start: 2022-09-26 | End: 2022-09-26 | Stop reason: HOSPADM

## 2022-09-26 RX ORDER — GLYCOPYRROLATE 0.2 MG/ML
INJECTION INTRAMUSCULAR; INTRAVENOUS PRN
Status: DISCONTINUED | OUTPATIENT
Start: 2022-09-26 | End: 2022-09-26 | Stop reason: SDUPTHER

## 2022-09-26 RX ORDER — NEOSTIGMINE METHYLSULFATE 1 MG/ML
INJECTION, SOLUTION INTRAVENOUS PRN
Status: DISCONTINUED | OUTPATIENT
Start: 2022-09-26 | End: 2022-09-26 | Stop reason: SDUPTHER

## 2022-09-26 RX ORDER — FENTANYL CITRATE 50 UG/ML
INJECTION, SOLUTION INTRAMUSCULAR; INTRAVENOUS PRN
Status: DISCONTINUED | OUTPATIENT
Start: 2022-09-26 | End: 2022-09-26 | Stop reason: SDUPTHER

## 2022-09-26 RX ORDER — DIPHENHYDRAMINE HYDROCHLORIDE 50 MG/ML
12.5 INJECTION INTRAMUSCULAR; INTRAVENOUS
Status: DISCONTINUED | OUTPATIENT
Start: 2022-09-26 | End: 2022-09-26 | Stop reason: HOSPADM

## 2022-09-26 RX ORDER — EPINEPHRINE NASAL SOLUTION 1 MG/ML
SOLUTION NASAL PRN
Status: DISCONTINUED | OUTPATIENT
Start: 2022-09-26 | End: 2022-09-26 | Stop reason: ALTCHOICE

## 2022-09-26 RX ORDER — DEXAMETHASONE SODIUM PHOSPHATE 4 MG/ML
INJECTION, SOLUTION INTRA-ARTICULAR; INTRALESIONAL; INTRAMUSCULAR; INTRAVENOUS; SOFT TISSUE PRN
Status: DISCONTINUED | OUTPATIENT
Start: 2022-09-26 | End: 2022-09-26 | Stop reason: SDUPTHER

## 2022-09-26 RX ORDER — HYDROCODONE BITARTRATE AND ACETAMINOPHEN 5; 325 MG/1; MG/1
1 TABLET ORAL EVERY 6 HOURS PRN
Qty: 28 TABLET | Refills: 0 | Status: SHIPPED | OUTPATIENT
Start: 2022-09-26 | End: 2022-09-26 | Stop reason: SDUPTHER

## 2022-09-26 RX ORDER — ADENOSINE 3 MG/ML
6 INJECTION, SOLUTION INTRAVENOUS ONCE
Status: DISCONTINUED | OUTPATIENT
Start: 2022-09-26 | End: 2022-09-26 | Stop reason: HOSPADM

## 2022-09-26 RX ORDER — KETAMINE HYDROCHLORIDE 10 MG/ML
INJECTION, SOLUTION INTRAMUSCULAR; INTRAVENOUS PRN
Status: DISCONTINUED | OUTPATIENT
Start: 2022-09-26 | End: 2022-09-26 | Stop reason: SDUPTHER

## 2022-09-26 RX ORDER — SODIUM CHLORIDE 0.9 % (FLUSH) 0.9 %
5-40 SYRINGE (ML) INJECTION EVERY 12 HOURS SCHEDULED
Status: DISCONTINUED | OUTPATIENT
Start: 2022-09-26 | End: 2022-09-26 | Stop reason: HOSPADM

## 2022-09-26 RX ORDER — HYDROCODONE BITARTRATE AND ACETAMINOPHEN 5; 325 MG/1; MG/1
1 TABLET ORAL EVERY 6 HOURS PRN
Qty: 28 TABLET | Refills: 0 | Status: SHIPPED | OUTPATIENT
Start: 2022-09-26 | End: 2022-10-03

## 2022-09-26 RX ORDER — KETOROLAC TROMETHAMINE 30 MG/ML
INJECTION, SOLUTION INTRAMUSCULAR; INTRAVENOUS PRN
Status: DISCONTINUED | OUTPATIENT
Start: 2022-09-26 | End: 2022-09-26 | Stop reason: SDUPTHER

## 2022-09-26 RX ORDER — TRAMADOL HYDROCHLORIDE 50 MG/1
50 TABLET ORAL PRN
Status: DISCONTINUED | OUTPATIENT
Start: 2022-09-26 | End: 2022-09-26 | Stop reason: HOSPADM

## 2022-09-26 RX ORDER — LIDOCAINE HYDROCHLORIDE AND EPINEPHRINE 10; 10 MG/ML; UG/ML
INJECTION, SOLUTION INFILTRATION; PERINEURAL PRN
Status: DISCONTINUED | OUTPATIENT
Start: 2022-09-26 | End: 2022-09-26 | Stop reason: ALTCHOICE

## 2022-09-26 RX ORDER — MIDAZOLAM HYDROCHLORIDE 1 MG/ML
INJECTION INTRAMUSCULAR; INTRAVENOUS PRN
Status: DISCONTINUED | OUTPATIENT
Start: 2022-09-26 | End: 2022-09-26 | Stop reason: SDUPTHER

## 2022-09-26 RX ORDER — LIDOCAINE HYDROCHLORIDE 20 MG/ML
INJECTION, SOLUTION EPIDURAL; INFILTRATION; INTRACAUDAL; PERINEURAL PRN
Status: DISCONTINUED | OUTPATIENT
Start: 2022-09-26 | End: 2022-09-26 | Stop reason: SDUPTHER

## 2022-09-26 RX ORDER — ADENOSINE 3 MG/ML
INJECTION, SOLUTION INTRAVENOUS
Status: COMPLETED
Start: 2022-09-26 | End: 2022-09-26

## 2022-09-26 RX ORDER — GLYCOPYRROLATE 0.2 MG/ML
INJECTION INTRAMUSCULAR; INTRAVENOUS PRN
Status: DISCONTINUED | OUTPATIENT
Start: 2022-09-26 | End: 2022-09-26

## 2022-09-26 RX ORDER — SODIUM CHLORIDE 9 MG/ML
INJECTION, SOLUTION INTRAVENOUS PRN
Status: DISCONTINUED | OUTPATIENT
Start: 2022-09-26 | End: 2022-09-26 | Stop reason: HOSPADM

## 2022-09-26 RX ORDER — TRAMADOL HYDROCHLORIDE 50 MG/1
100 TABLET ORAL PRN
Status: DISCONTINUED | OUTPATIENT
Start: 2022-09-26 | End: 2022-09-26 | Stop reason: HOSPADM

## 2022-09-26 RX ORDER — PROPOFOL 10 MG/ML
INJECTION, EMULSION INTRAVENOUS PRN
Status: DISCONTINUED | OUTPATIENT
Start: 2022-09-26 | End: 2022-09-26 | Stop reason: SDUPTHER

## 2022-09-26 RX ORDER — SODIUM CHLORIDE 9 MG/ML
INJECTION, SOLUTION INTRAVENOUS CONTINUOUS PRN
Status: DISCONTINUED | OUTPATIENT
Start: 2022-09-26 | End: 2022-09-26 | Stop reason: SDUPTHER

## 2022-09-26 RX ORDER — MEPERIDINE HYDROCHLORIDE 25 MG/ML
12.5 INJECTION INTRAMUSCULAR; INTRAVENOUS; SUBCUTANEOUS EVERY 5 MIN PRN
Status: DISCONTINUED | OUTPATIENT
Start: 2022-09-26 | End: 2022-09-26 | Stop reason: HOSPADM

## 2022-09-26 RX ORDER — ONDANSETRON 2 MG/ML
INJECTION INTRAMUSCULAR; INTRAVENOUS PRN
Status: DISCONTINUED | OUTPATIENT
Start: 2022-09-26 | End: 2022-09-26 | Stop reason: SDUPTHER

## 2022-09-26 RX ORDER — DIAPER,BRIEF,INFANT-TODD,DISP
EACH MISCELLANEOUS PRN
Status: DISCONTINUED | OUTPATIENT
Start: 2022-09-26 | End: 2022-09-26 | Stop reason: ALTCHOICE

## 2022-09-26 RX ORDER — ONDANSETRON 4 MG/1
4 TABLET, ORALLY DISINTEGRATING ORAL EVERY 8 HOURS PRN
Qty: 10 TABLET | Refills: 1 | Status: SHIPPED | OUTPATIENT
Start: 2022-09-26 | End: 2022-10-28 | Stop reason: ALTCHOICE

## 2022-09-26 RX ADMIN — LIDOCAINE HYDROCHLORIDE 100 MG: 20 INJECTION, SOLUTION EPIDURAL; INFILTRATION; INTRACAUDAL; PERINEURAL at 11:21

## 2022-09-26 RX ADMIN — ONDANSETRON 4 MG: 2 INJECTION INTRAMUSCULAR; INTRAVENOUS at 12:54

## 2022-09-26 RX ADMIN — GLYCOPYRROLATE 0.5 MG: 0.2 INJECTION, SOLUTION INTRAMUSCULAR; INTRAVENOUS at 12:57

## 2022-09-26 RX ADMIN — KETOROLAC TROMETHAMINE 30 MG: 30 INJECTION, SOLUTION INTRAMUSCULAR; INTRAVENOUS at 12:54

## 2022-09-26 RX ADMIN — FENTANYL CITRATE 50 MCG: 50 INJECTION, SOLUTION INTRAMUSCULAR; INTRAVENOUS at 12:28

## 2022-09-26 RX ADMIN — Medication 3 MG: at 12:57

## 2022-09-26 RX ADMIN — KETAMINE HYDROCHLORIDE 25 MG: 10 INJECTION INTRAMUSCULAR; INTRAVENOUS at 11:21

## 2022-09-26 RX ADMIN — MIDAZOLAM 2 MG: 1 INJECTION INTRAMUSCULAR; INTRAVENOUS at 11:17

## 2022-09-26 RX ADMIN — PROPOFOL 140 MG: 10 INJECTION, EMULSION INTRAVENOUS at 11:21

## 2022-09-26 RX ADMIN — SODIUM CHLORIDE: 9 INJECTION, SOLUTION INTRAVENOUS at 11:08

## 2022-09-26 RX ADMIN — ADENOSINE 6 MG: 3 INJECTION INTRAVENOUS at 13:20

## 2022-09-26 RX ADMIN — FENTANYL CITRATE 100 MCG: 50 INJECTION, SOLUTION INTRAMUSCULAR; INTRAVENOUS at 11:21

## 2022-09-26 RX ADMIN — PROPOFOL 30 MG: 10 INJECTION, EMULSION INTRAVENOUS at 12:54

## 2022-09-26 RX ADMIN — ROCURONIUM BROMIDE 40 MG: 10 INJECTION, SOLUTION INTRAVENOUS at 11:21

## 2022-09-26 RX ADMIN — DEXAMETHASONE SODIUM PHOSPHATE 10 MG: 4 INJECTION, SOLUTION INTRAMUSCULAR; INTRAVENOUS at 11:24

## 2022-09-26 ASSESSMENT — PAIN DESCRIPTION - ONSET
ONSET: ON-GOING

## 2022-09-26 ASSESSMENT — PAIN SCALES - GENERAL
PAINLEVEL_OUTOF10: 4

## 2022-09-26 ASSESSMENT — PAIN DESCRIPTION - DESCRIPTORS
DESCRIPTORS: DISCOMFORT
DESCRIPTORS: ACHING

## 2022-09-26 ASSESSMENT — PAIN - FUNCTIONAL ASSESSMENT
PAIN_FUNCTIONAL_ASSESSMENT: ACTIVITIES ARE NOT PREVENTED
PAIN_FUNCTIONAL_ASSESSMENT: 0-10
PAIN_FUNCTIONAL_ASSESSMENT: ACTIVITIES ARE NOT PREVENTED
PAIN_FUNCTIONAL_ASSESSMENT: ACTIVITIES ARE NOT PREVENTED

## 2022-09-26 ASSESSMENT — PAIN DESCRIPTION - ORIENTATION
ORIENTATION: MID
ORIENTATION: LOWER;MID
ORIENTATION: MID

## 2022-09-26 ASSESSMENT — PAIN DESCRIPTION - LOCATION
LOCATION: NOSE
LOCATION: NOSE;THROAT
LOCATION: NOSE

## 2022-09-26 ASSESSMENT — PAIN DESCRIPTION - PAIN TYPE
TYPE: SURGICAL PAIN

## 2022-09-26 ASSESSMENT — LIFESTYLE VARIABLES: SMOKING_STATUS: 1

## 2022-09-26 ASSESSMENT — PAIN DESCRIPTION - FREQUENCY
FREQUENCY: CONTINUOUS

## 2022-09-26 NOTE — ANESTHESIA POSTPROCEDURE EVALUATION
Department of Anesthesiology  Postprocedure Note    Patient: Jeanne Jiang  MRN: [id number]  YOB: 1990  Date of evaluation: 9/26/2022      Procedure Summary     Date: 09/26/22 Room / Location: [facility] OR 01 / [facility]    Anesthesia Start: 1108 Anesthesia Stop: 0747    Procedure: FUNCTIONAL ENDOSCOPIC SINUS SURGERY SEPTOPLASTY SUBMUCOSAL RESECTION INFERIOR TURBINATES (Nose) Diagnosis:       Chronic maxillary sinusitis      (Chronic maxillary sinusitis [J32.0])    Surgeons: Jack Calderon DO Responsible Provider: Lin Galvan MD    Anesthesia Type: general ASA Status: 2          Anesthesia Type: No value filed.     Deandre Phase I: Deandre Score: 10    Deandre Phase II:        Anesthesia Post Evaluation    Patient location during evaluation: PACU  Patient participation: complete - patient participated  Level of consciousness: awake and alert  Airway patency: patent  Nausea & Vomiting: no nausea and no vomiting  Complications: no  Cardiovascular status: hemodynamically stable  Respiratory status: acceptable  Hydration status: euvolemic  Multimodal analgesia pain management approach

## 2022-09-26 NOTE — ANESTHESIA POSTPROCEDURE EVALUATION
Department of Anesthesiology  Postprocedure Note    Patient: Chris Morton  MRN: [id number]  YOB: 1990  Date of evaluation: 9/26/2022      Procedure Summary     Date: 09/26/22 Room / Location: [facility] / [facility]    Anesthesia Start: 1108 Anesthesia Stop: 5030    Procedure: FUNCTIONAL ENDOSCOPIC SINUS SURGERY SEPTOPLASTY SUBMUCOSAL RESECTION INFERIOR TURBINATES (Nose) Diagnosis:       Chronic maxillary sinusitis      (Chronic maxillary sinusitis [J32.0])    Surgeons: Radha Bowser DO Responsible Provider: Mena Coughlin MD    Anesthesia Type: general ASA Status: 2          Anesthesia Type: No value filed.     Deandre Phase I: Deandre Score: 10    Deandre Phase II:        Anesthesia Post Evaluation    Patient location during evaluation: PACU  Patient participation: complete - patient participated  Level of consciousness: awake and alert  Pain score: 0  Airway patency: patent  Nausea & Vomiting: no nausea and no vomiting  Complications: no  Cardiovascular status: blood pressure returned to baseline (tachy see post op note and Q NOTE)  Respiratory status: acceptable  Hydration status: euvolemic

## 2022-09-26 NOTE — ANESTHESIA PRE PROCEDURE
Department of Anesthesiology  Preprocedure Note       Name:  Andreas Trevizo   Age:  33 y.o.  :  1990                                          MRN:  [id number]         Date:  2022      Surgeon: Kemar Nair):  Dianna Cardenas DO    Procedure: Procedure(s):  FUNCTIONAL ENDOSCOPIC SINUS SURGERY SEPTOPLASTY SUBMUCOSAL RESECTION INFERIOR TURBINATES    Medications prior to admission:   Prior to Admission medications    Medication Sig Start Date End Date Taking? Authorizing Provider   fluticasone (FLONASE) 50 MCG/ACT nasal spray 1 spray by Each Nostril route daily    Historical Provider, MD   azelastine (ASTELIN) 0.1 % nasal spray 1 spray by Nasal route 2 times daily Use in each nostril as directed 22   ELLEN Wells CNP   tadalafil (CIALIS) 20 MG tablet TAKE 1 TABLET BY MOUTH EVERY DAY SEXUAL ACTIVITY AND 30 MINUTES BEFORE SEXUAL ACTIVITY AS NEEDED. DO NOT USE MORE THAN 1 DOSE PER 24 HOURS 22   Historical Provider, MD       Current medications:    No current facility-administered medications for this visit. No current outpatient medications on file. Facility-Administered Medications Ordered in Other Visits   Medication Dose Route Frequency Provider Last Rate Last Admin    sodium chloride flush 0.9 % injection 5-40 mL  5-40 mL IntraVENous 2 times per day Alberto Rumps, DO        sodium chloride flush 0.9 % injection 5-40 mL  5-40 mL IntraVENous PRN Alberto Rumps, DO        0.9 % sodium chloride infusion   IntraVENous PRN Alberto Rumps, DO           Allergies:     Allergies   Allergen Reactions    Cefazolin Rash    Cefprozil Rash       Problem List:    Patient Active Problem List   Diagnosis Code    Anxiety state F41.1    Elevated liver enzymes R74.8    Chest pain R07.9    History of alcohol use disorder Z87.898    Alcohol related seizure ([facility] R56.9       Past Medical History:        Diagnosis Date    Acid reflux     Alcohol related seizure ([address] 2021    Cephalgia 12/20/2021    Erectile dysfunction     Sinus disorder        Past Surgical History:        Procedure Laterality Date    CYSTOSCOPY  2008    UPPER GASTROINTESTINAL ENDOSCOPY N/A 5/3/2022    EGD ESOPHAGOGASTRODUODENOSCOPY performed by Kinza Nicole MD at [address] [place] [address] Crossing History:    Social History     Tobacco Use    Smoking status: Every Day     Packs/day: 0.25     Years: 12.00     Pack years: 3.00     Types: Cigarettes    Smokeless tobacco: Never   Substance Use Topics    Alcohol use: Not Currently     Alcohol/week: 15.0 standard drinks     Types: 15 Cans of beer per week     Comment: stopped 1/16/22                                Ready to quit: Not Answered  Counseling given: Not Answered      Vital Signs (Current): There were no vitals filed for this visit.                                            BP Readings from Last 3 Encounters:   08/02/22 (!) 143/95   07/19/22 118/66   06/29/22 125/82       NPO Status:                                                                                 BMI:   Wt Readings from Last 3 Encounters:   09/20/22 148 lb (67.1 kg)   08/02/22 149 lb (67.6 kg)   07/19/22 149 lb (67.6 kg)     There is no height or weight on file to calculate BMI.    CBC:   Lab Results   Component Value Date/Time    WBC 6.9 07/19/2022 02:47 AM    RBC 4.58 07/19/2022 02:47 AM    HGB 13.9 07/19/2022 02:47 AM    HCT 40.0 07/19/2022 02:47 AM    MCV 87.3 07/19/2022 02:47 AM    RDW 11.8 07/19/2022 02:47 AM     07/19/2022 02:47 AM       CMP:   Lab Results   Component Value Date/Time     07/19/2022 02:47 AM    K 4.2 07/19/2022 02:47 AM    K 4.2 12/01/2021 10:17 AM     07/19/2022 02:47 AM    CO2 25 07/19/2022 02:47 AM    BUN 8 07/19/2022 02:47 AM    CREATININE 0.8 07/19/2022 02:47 AM    GFRAA >60 07/19/2022 02:47 AM    LABGLOM >60 07/19/2022 02:47 AM    GLUCOSE 93 07/19/2022 02:47 AM    PROT 8.1 07/19/2022 02:47 AM    CALCIUM 9.9 07/19/2022 02:47 AM    BILITOT 1.0 07/19/2022 02:47 AM    ALKPHOS 116 07/19/2022 02:47 AM    AST 32 07/19/2022 02:47 AM    ALT 52 07/19/2022 02:47 AM       POC Tests: No results for input(s): POCGLU, POCNA, POCK, POCCL, POCBUN, POCHEMO, POCHCT in the last 72 hours. Coags: No results found for: PROTIME, INR, APTT    HCG (If Applicable): No results found for: PREGTESTUR, PREGSERUM, HCG, HCGQUANT     ABGs: No results found for: PHART, PO2ART, RKW0UEZ, KCR1NUK, BEART, M0GFFSSW     Type & Screen (If Applicable):  No results found for: LABABO, LABRH    Drug/Infectious Status (If Applicable):  No results found for: HIV, HEPCAB    COVID-19 Screening (If Applicable): No results found for: COVID19        Anesthesia Evaluation  Patient summary reviewed and Nursing notes reviewed no history of anesthetic complications:   Airway: Mallampati: II  TM distance: >3 FB   Neck ROM: full  Mouth opening: > = 3 FB   Dental: normal exam         Pulmonary: breath sounds clear to auscultation  (+) current smoker                          ROS comment: vapes   Cardiovascular:  Exercise tolerance: good (>4 METS),           Rhythm: regular  Rate: normal           Beta Blocker:  Not on Beta Blocker         Neuro/Psych:   Negative Neuro/Psych ROS              GI/Hepatic/Renal:   (+) GERD:,          ROS comment: Former alcohol abuse - per patient, no alcohol for 3 1/2 months. Endo/Other: Negative Endo/Other ROS                    Abdominal:             Vascular: negative vascular ROS. Other Findings:             Anesthesia Plan      general     ASA 2       Induction: intravenous. MIPS: Postoperative opioids intended and Prophylactic antiemetics administered. Anesthetic plan and risks discussed with patient. Plan discussed with CRNA. DOS STAFF ADDENDUM:    Patient seen and examined, physical exam updated as needed, chart reviewed. NPO status confirmed. Anesthetic options and risks discussed with patient/legal guardian.   Patient/legal guardian verbalized understanding and agrees to proceed.      ELLEN Carmona - CRNA  Staff CRNA  2022  2:27 PM                  Royetta Apgar, MD   2022

## 2022-09-26 NOTE — H&P
Cassy Villegas was seen and re-examined preoperatively today, September 26, 2022. There was no substantial change in his physical and medical status. Patient is fit for the proposed surgical procedure. All questions were appropriately addressed and had no further questions regarding the risks, benefits, and alternatives of the procedure. Cassy Villegas and family wished to proceed.     Dilia Cuevas DO  Resident Physician  [facility])  Otolaryngology Residency  9/26/2022  10:41 AM

## 2022-09-26 NOTE — DISCHARGE INSTRUCTIONS
Please follow up with Dr. Jasmeet Barraza in 1 week for removal of splints    Number:  [phone]    Office:  [facility], [address]  [facility]      Open Mouth and cover when sneezing, coughing  No nose blowing for 2 weeks  Nasal saline spray in each nostril 4-5 times a day  Take medicines as directed  Ice to back of neck for comfort  Sleep with head elevated 30 degrees for the next few days      Follow up with Dr. Jasmeet Barraza in 7 days [phone]     Open Mouth and cover when sneezing, coughing  No nose blowing for 2 weeks  Nasal saline spray in each nostril 4-5 times a day  Take medicines as directed  Ice to back of neck for comfort  Sleep with head elevated 30 degrees for the next few days      Endoscopic Sinus Surgery: What to Expect at [address] will have a drip pad under your nose to collect mucus and blood. Change it only when it bleeds through. You may have to do this every hour for 24 hours after surgery. You may have some swelling of your nose, upper lip, cheeks, or around your eyes. Your nose may be sore and will bleed. You may feel \"stuffed up\" like you have a bad head cold. This will last for several days after surgery. The tip of your nose and your upper lip and gums may be numb. Feeling will return in a few weeks to a few months. Your sense of smell will not be as good after surgery. It will improve and probably return to normal in 1 to 2 months. You will probably be able to return to work or school in about 1 week and to your normal routine in about 3 weeks. However, this varies with your job and the extent of your surgery. Most people feel normal in 1 to 2 months. You will have to visit your doctor regularly for 3 to 4 months after your surgery. Your doctor will check to see that your sinuses are healing well. This care sheet gives you a general idea about how long it will take for you to recover.  But each person recovers at a different pace. Follow the steps below to get better as quickly as possible. How can you care for yourself at home? Activity  Rest when you feel tired. Getting enough sleep will help you recover. Do not lie flat. Raise your head with three or four pillows. This can reduce swelling. Try to sleep on your back during the month after surgery. You can also sleep in a reclining chair. Try to walk each day. Start by walking a little more than you did the day before. Bit by bit, increase the amount you walk. Walking boosts blood flow and helps prevent pneumonia and constipation. Also, try to sit and stand as much as you can. For 1 week, try not to bend over or lift anything heavier than 10 pounds. This may include a child, heavy grocery bags and milk containers, a heavy briefcase or backpack, cat litter or dog food bags, or a vacuum . You can take a shower or bath. Use lukewarm, not hot water. Avoid swimming for 6 weeks. Avoid sawdust, chemicals, and excessive dust for 4 weeks. Avoid strenuous activities, such as biking, jogging, weight lifting, or aerobic exercise, for 1 week and then ease back into these activities over 2 to 3 weeks. You may drive when you are no longer taking prescription pain medicine and feel up to it. You will probably be able to return to work or school in about 1 week and to your normal routine in about 3 weeks. However, this varies with your job and the extent of your surgery. Diet  You can eat your normal diet. If your stomach is upset, try bland, low-fat foods like plain rice, broiled chicken, toast, and yogurt. You may notice that your bowel movements are not regular right after your surgery. This is common. Try to avoid constipation and straining with bowel movements. You may want to take a fiber supplement every day. If you have not had a bowel movement after a couple of days, ask your doctor about taking a mild laxative.   Medicines  Do not take aspirin, aspirin-containing medicines, or anti-inflammatory medicines such as ibuprofen (Advil, Motrin) or naproxen (Aleve) for 3 weeks following surgery unless your doctor says it is okay. Take pain medicines exactly as directed. If the doctor gave you a prescription medicine for pain, take it as prescribed. Do not take two or more pain medicines at the same time unless the doctor told you to. Many pain medicines have acetaminophen, which is Tylenol. Too much acetaminophen (Tylenol) can be harmful. If your doctor prescribed antibiotics, take them as directed. Do not stop taking them just because you feel better. You need to take the full course of antibiotics. If you think your pain medicine is making you sick to your stomach: Take your medicine after meals (unless your doctor has told you not to). Ask your doctor for a different pain medicine. Incision care  You probably will not have an incision (cut). You will have a drip pad under your nose to collect blood. Change it only when it has bled through. You may have to do this every hour for 24 hours after surgery. When bleeding stops, you can remove it. If you have packing in your nose, leave it in. Your doctor will take it out. Ice and elevation  To help with swelling and pain, put ice or a cold pack on your nose for 10 to 20 minutes at a time. Put a thin cloth between the ice and your skin. Do not sleep flat. Sleep with your head raised up. You can also sleep in a reclining chair. Other instructions  Do not blow your nose for 2 weeks. Do not put anything into your nose. If you must sneeze, open your mouth and sneeze naturally. Keep your mouth clean. Rinse your mouth with salt water or an alcohol-free mouthwash after each meal and before bedtime. After any packing is removed, use saline (saltwater) nasal washes to help keep your nasal passages open and wash out mucus and bacteria. You can buy saline nose drops at a grocery store or drugstore. You can wear your glasses when you wish.  Do not wear contacts until the day after the surgery. Do not travel by airplane for at least 2 weeks. Your sinuses are still healing, and the changes in air pressure can affect them. Follow-up care is a key part of your treatment and safety. Be sure to make and go to all appointments, and call your doctor if you are having problems. It's also a good idea to know your test results and keep a list of the medicines you take. When should you call for help? Call 911 anytime you think you may need emergency care. For example, call if:  You passed out (lost consciousness). You have severe trouble breathing. Call your doctor now or seek immediate medical care if:  The dressing soaks through with blood and needs to be changed more than every 15 minutes. You have a fever with a stiff neck or a severe headache. You are sensitive to light, or you feel very sleepy or confused. You have pain that does not get better after you take pain medicine. You have a fever over 100°F.  You have double or blurred vision, cannot close your eyes, or have eye pain. Watch closely for changes in your health, and be sure to contact your doctor if:  You do not have a bowel movement after taking a laxative. Where can you learn more? Go to [email or web address] and sign in to your [employer] account. Enter N404 in the [facility] box to learn more about Endoscopic Sinus Surgery: What to Expect at Home.     If you do not have an account, please click on the Sign Up Now link. © 1005-9618 Healthwise, Incorporated. Care instructions adapted under license by [facility]. This care instruction is for use with your licensed healthcare professional. If you have questions about a medical condition or this instruction, always ask your healthcare professional. [place] [address] any warranty or liability for your use of this information.   Content Version: 10.1.724676; Current as of: October 29, 2013

## 2022-09-26 NOTE — OP NOTE
Operative Note      Patient: Neptali Sequeira  YOB: 1990  MRN: [id number]    Date of Procedure: 9/26/2022    Pre-Op Diagnosis: Chronic maxillary sinusitis [J32.0]    Post-Op Diagnosis: Same       Procedure(s):  FUNCTIONAL ENDOSCOPIC SINUS SURGERY SEPTOPLASTY SUBMUCOSAL RESECTION INFERIOR TURBINATES    Surgeon(s):  Ray Grigsby DO    Assistant:   Resident: Demarco Garcia DO; Brandy Tse PGY1    Anesthesia: General    Estimated Blood Loss (mL): less than 50     Complications: None    Specimens:   ID Type Source Tests Collected by Time Destination   A : NASAL SEPTAL CONTENTS Nasal Nose SURGICAL PATHOLOGY Marshall Margarito Cardenas DO 9/26/2022 1211        Implants:  Implant Name Type Inv. Item Serial No.  Lot No. LRB No. Used Action   IMPL SINUS RELEASE PROPEL CONTOUR - [id number] Face/Chin/Dental/Voice IMPL SINUS RELEASE PROPEL CONTOUR  [employer] [facility] [id number] N/A 1 Implanted   IMPL SINUS RELEASE PROPEL CONTOUR - [id number] Face/Chin/Dental/Voice IMPL SINUS RELEASE PROPEL CONTOUR [id number] [employer] [facility] [id number] N/A 1 Implanted   STENT NSL L16MM DIA4MM 370UG MINI MOMETASONE FUROATE LO - [id number]  STENT NSL L16MM DIA4MM 370UG MINI MOMETASONE FUROATE LO [id number] INTERSECT ENT- [id number] N/A 1 Implanted   STENT NSL L16MM DIA4MM 370UG MINI MOMETASONE FUROATE LO - [id number]  STENT NSL L16MM DIA4MM 370UG MINI MOMETASONE FUROATE LO [id number] INTER[employer] E[facility]- [id number] N/A 1 Implanted         Drains: * No LDAs found *    Findings: septal spur to the left    Detailed Description of Procedure:       Prep  The patient was consented preoperatively and taken back to the operating room, identified appropriately, placed in the supine position, given anesthesia for general intubation. The patient was intubated. They were prepped and draped in a sterile fashion.  Initial prep for the nose was 4% cocaine pledgets placed into both nasal cavities and the patient's nasal walls medial and lateral along the septal line and then along the inferior turbinate were injected with approximately 10mL of 1% lidocaine with epinephrine. That was total for both sides. The pledgets were allowed to sit for approximately 5 minutes, while the rest of the patient's prep was done. Septoplasty  Starting on the left side, the septal balloon was inserted along the floor in the nasal cavity along the nasal spur. This was blown up to 8 brigitte of pressure. This was done 3x superior/inferior and along the spur. This reduced to the right and a freer and Blakesley was used to remove the bony spur once it was reduced with the baloon and Ankur forceps. Injection  The 30 degree endoscope was place into the left nostril along with a 10cc syringe with a spinal needle. The anterior root of the middle turbinate was injected with 2cc of 1% lidocaine with epinephrine. The right anterior root was also injected with the same amount. The posterior root of the middle turbinates were then addressed by injecting both sides with 2cc of 1% lidocain with epinephrine. Sphenoid  LEFT:  Once the patient was properly set up, the pledgets were taken out of the nose and a 30-degree endoscope was placed in the patient's left nasal cavity. Moving from a posterior to anterior position, the first sinus that was addressed was the sphenoid sinus. The 30-degree endoscope was placed back to the area of the supreme turbinate along the posterior nasopharyngeal wall approximately 1 cm above the nasal choanae on the left side was seen the beginning of the sphenoid sinus. This was somewhat atretic and the sinus was maybe 1 to 2 mm at best. Using the Acclarent functional sphenoid guide, the guide was placed along the same path as the endoscope and placed right up to the area where the sphenoid sinus could be seen. The lighted guidewire was then placed into the sphenoid sinus with manipulation.  Once it was in the sphenoid sinus, a 6-mm balloon was then Olimpia Kazakh, the patient's middle turbinate was medialized until the ethmoid and uncinate process were in full view. Injections were placed at the root of the middle turbinate and approximately 1 to 2 mL were placed in the root of the middle turbinate for anesthesia as well as to control bleeding. The frontal sinus guide from [employer]arent was then used to find the patient's   frontal sinus. The guidewire was used and I was visually able to pass the guidewire into the patient's frontal sinus on the left side. This was clearly visible as the light was found just under the patient's brow in the area of the frontal sinus. A guidewire was left in place. The balloon was pushed over it until the balloon was straddling the frontal recess. The 6-mm balloon was then used to dilate, being dilated up to 12 mL of water pressure. This was held for 3 seconds and removed. There was a second more inferior   inflation to open up the frontal recess further. Once the frontal sinus was opened, it was visually examined and it was found to be large wide opening. The frontal recess was then irrigated out with 180 mL of normal saline to clear out the frontal sinus. The balloon was then retracted and the frontal sinus seeker was withdrawn. RIGHT:  Attention was then turned to the frontals starting with the left side. Using the Olimpia Kazakh, the patient's middle turbinate was medialized until the ethmoid and uncinate process were in full view. Injections were placed at the root of the middle turbinate and approximately 1 to 2 mL were placed in the root of the middle turbinate for anesthesia as well as to control bleeding. The frontal sinus guide from [employer]arent was then used to find the patient's   frontal sinus. The guidewire was used and I was visually able to pass the guidewire into the patient's frontal sinus on the left side. This was clearly visible as the light was found just under the patient's brow in the area of the frontal sinus.  A guidewire was left in place. The balloon was pushed over it until the balloon was straddling the frontal recess. The 6-mm balloon was then used to dilate, being dilated up to 12 mL of water pressure. This was held for 3 seconds and removed. There was a second more inferior   inflation to open up the frontal recess further. Once the frontal sinus was opened, it was visually examined and it was found to be large wide opening. The frontal recess was then irrigated out with 180 mL of normal saline to clear out the frontal sinus. The balloon was then retracted and the frontal sinus seeker was withdrawn. Maxillary   LEFT:  With the maxillary sinuses, starting on the left side, the patient was not found to have an accessory os. The patient was not also found to have a meenu bullosa which was not reduced to allow access into the Osteomeatal complex. The maxillary sinus guidewire was used to go to the posterior aspect of the uncinate. The tip of the guidewire was placed just lateral to the uncinate process and the guidewire was advanced. Once the guidewire was placed across the ostium, visualization was found by light in the left maxillary sinus. The balloon was advanced over the ostium and on the inflation to 12 mm of pressure. The balloon did open up the uncinate process as well and the bowing of this uncinate process medially showed that we were in the appropriate position. This was allowed to hold for 3 seconds and then taken down. The patient's sinus was not then irrigated out as well with 180 mL of normal saline and then the balloon and guidewire were withdrawn. The maxillary seeker was removed from the nose. RIGHT:  With the maxillary sinuses, starting on the right side, the patient was not found to have an accessory os. The patient was not also found to have a meenu bullosa which was not reduced to allow access into the Osteomeatal complex.  The maxillary sinus guidewire was used to go to the posterior aspect of the uncinate. The tip of the guidewire was placed just lateral to the uncinate process and the guidewire was advanced. Once the guidewire was placed across the ostium, visualization was found by light in the right maxillary sinus. The balloon was advanced over the ostium and on the inflation to 12 mm of pressure. The balloon did open up the uncinate process as well and the bowing of this uncinate process medially showed that we were in the appropriate position. This was allowed to hold for 3 seconds and then taken down. The patient's sinus was then irrigated out as well with 180 mL of normal saline and then the balloon and guidewire were withdrawn. The maxillary seeker was removed from the nose. The bilateral inferior turbinates were medialized with a boies elevator. A syringe with saline and a 22 gauge spinal needle was then used to inject the left turbinate mucosa to allow proper coblation. Using an arthrocare Reflex wand, a small puncture hole was made with the instrument in the anterior portion of the left inferior turbinate. The instrument was advanced along the bone of the turbinate, elevating the periostium from the mucosa. The mucosa was then ablated until a thin mucosal layer was left over the bone. Multiple sites were made going from superior to inferior. The turbinate was then lateralized with a boies elevator. Attention was turned to the right side. A syringe with saline and a 22 gauge spinal needle was then used to inject the right turbinate mucosa to allow proper coblationUsing an arthrocare Reflex wand, a small puncture hole was made with the instrument in the anterior portion of the left inferior turbinate. The instrument was advanced along the bone of the turbinate, elevating the periostium from the mucosa. The mucosa was then ablated until a thin mucosal layer was left over the bone. Multiple sites were made going from superior to inferior.   The turbinate was then lateralized again with a boies elevator. At the completion of these procedures, the maxillary os bilaterally were stented with Propel Contour drug eluting stents and a Propel Mini drug eluting stent was placed in the frontal recess. Gruber splints and sinufoam was placed into the nasal cavity. The gruber splints were sewn in with Nylon.        Electronically signed by Demetrios Nyhan, DO on 9/26/2022 at 12:57 PM

## 2022-09-27 ENCOUNTER — TELEPHONE (OUTPATIENT)
Dept: ENT CLINIC | Age: 32
End: 2022-09-27

## 2022-09-27 NOTE — TELEPHONE ENCOUNTER
Patient called with questions regarding sinus rinse from Sx yesterday.      Electronically signed by David Prakash MA on 9/27/22 at 2:26 PM EDT

## 2022-09-28 ENCOUNTER — TELEPHONE (OUTPATIENT)
Dept: ENT CLINIC | Age: 32
End: 2022-09-28

## 2022-09-30 ENCOUNTER — APPOINTMENT (OUTPATIENT)
Dept: GENERAL RADIOLOGY | Age: 32
End: 2022-09-30
Payer: MEDICARE

## 2022-09-30 ENCOUNTER — TELEPHONE (OUTPATIENT)
Dept: ENT CLINIC | Age: 32
End: 2022-09-30

## 2022-09-30 ENCOUNTER — HOSPITAL ENCOUNTER (EMERGENCY)
Age: 32
Discharge: HOME | End: 2022-09-30
Attending: EMERGENCY MEDICINE
Payer: MEDICARE

## 2022-09-30 VITALS
DIASTOLIC BLOOD PRESSURE: 84 MMHG | BODY MASS INDEX: 24.66 KG/M2 | HEART RATE: 91 BPM | HEIGHT: 65 IN | TEMPERATURE: 98 F | WEIGHT: 148 LBS | RESPIRATION RATE: 16 BRPM | SYSTOLIC BLOOD PRESSURE: 130 MMHG | OXYGEN SATURATION: 96 %

## 2022-09-30 DIAGNOSIS — R07.9 CHEST PAIN, UNSPECIFIED TYPE: Primary | ICD-10-CM

## 2022-09-30 LAB
ANION GAP SERPL CALCULATED.3IONS-SCNC: 9 MMOL/L (ref 7–16)
BASOPHILS ABSOLUTE: 0.02 E9/L (ref 0–0.2)
BASOPHILS RELATIVE PERCENT: 0.4 % (ref 0–2)
BUN BLDV-MCNC: 7 MG/DL (ref 6–20)
CALCIUM SERPL-MCNC: 10 MG/DL (ref 8.6–10.2)
CHLORIDE BLD-SCNC: 98 MMOL/L (ref 98–107)
CO2: 27 MMOL/L (ref 22–29)
CREAT SERPL-MCNC: 0.8 MG/DL (ref 0.7–1.2)
D DIMER: <200 NG/ML DDU
EKG ATRIAL RATE: 87 BPM
EKG P AXIS: 72 DEGREES
EKG P-R INTERVAL: 152 MS
EKG Q-T INTERVAL: 358 MS
EKG QRS DURATION: 88 MS
EKG QTC CALCULATION (BAZETT): 430 MS
EKG R AXIS: 45 DEGREES
EKG T AXIS: 62 DEGREES
EKG VENTRICULAR RATE: 87 BPM
EOSINOPHILS ABSOLUTE: 0.05 E9/L (ref 0.05–0.5)
EOSINOPHILS RELATIVE PERCENT: 1 % (ref 0–6)
GFR AFRICAN AMERICAN: >60
GFR NON-AFRICAN AMERICAN: >60 ML/MIN/1.73
GLUCOSE BLD-MCNC: 96 MG/DL (ref 74–99)
HCT VFR BLD CALC: 38.6 % (ref 37–54)
HEMOGLOBIN: 13.2 G/DL (ref 12.5–16.5)
IMMATURE GRANULOCYTES #: 0.01 E9/L
IMMATURE GRANULOCYTES %: 0.2 % (ref 0–5)
LYMPHOCYTES ABSOLUTE: 1.27 E9/L (ref 1.5–4)
LYMPHOCYTES RELATIVE PERCENT: 24.4 % (ref 20–42)
MCH RBC QN AUTO: 30.6 PG (ref 26–35)
MCHC RBC AUTO-ENTMCNC: 34.2 % (ref 32–34.5)
MCV RBC AUTO: 89.4 FL (ref 80–99.9)
MONOCYTES ABSOLUTE: 0.54 E9/L (ref 0.1–0.95)
MONOCYTES RELATIVE PERCENT: 10.4 % (ref 2–12)
NEUTROPHILS ABSOLUTE: 3.31 E9/L (ref 1.8–7.3)
NEUTROPHILS RELATIVE PERCENT: 63.6 % (ref 43–80)
PDW BLD-RTO: 11.8 FL (ref 11.5–15)
PLATELET # BLD: 165 E9/L (ref 130–450)
PMV BLD AUTO: 10 FL (ref 7–12)
POTASSIUM REFLEX MAGNESIUM: 4.4 MMOL/L (ref 3.5–5)
PRO-BNP: 14 PG/ML (ref 0–125)
RBC # BLD: 4.32 E12/L (ref 3.8–5.8)
SARS-COV-2, NAAT: NOT DETECTED
SODIUM BLD-SCNC: 134 MMOL/L (ref 132–146)
TROPONIN, HIGH SENSITIVITY: <6 NG/L (ref 0–11)
WBC # BLD: 5.2 E9/L (ref 4.5–11.5)

## 2022-09-30 PROCEDURE — 93005 ELECTROCARDIOGRAM TRACING: CPT | Performed by: PHYSICIAN ASSISTANT

## 2022-09-30 PROCEDURE — 71046 X-RAY EXAM CHEST 2 VIEWS: CPT

## 2022-09-30 PROCEDURE — 80048 BASIC METABOLIC PNL TOTAL CA: CPT

## 2022-09-30 PROCEDURE — 83880 ASSAY OF NATRIURETIC PEPTIDE: CPT

## 2022-09-30 PROCEDURE — 84484 ASSAY OF TROPONIN QUANT: CPT

## 2022-09-30 PROCEDURE — 85378 FIBRIN DEGRADE SEMIQUANT: CPT

## 2022-09-30 PROCEDURE — 87635 SARS-COV-2 COVID-19 AMP PRB: CPT

## 2022-09-30 PROCEDURE — 85025 COMPLETE CBC W/AUTO DIFF WBC: CPT

## 2022-09-30 PROCEDURE — 99285 EMERGENCY DEPT VISIT HI MDM: CPT

## 2022-09-30 RX ORDER — ONDANSETRON 4 MG/1
4 TABLET, ORALLY DISINTEGRATING ORAL EVERY 8 HOURS PRN
Qty: 24 TABLET | Refills: 0 | Status: SHIPPED | OUTPATIENT
Start: 2022-09-30 | End: 2022-10-28 | Stop reason: ALTCHOICE

## 2022-09-30 RX ORDER — OMEPRAZOLE 20 MG/1
20 CAPSULE, DELAYED RELEASE ORAL
Qty: 30 CAPSULE | Refills: 0 | Status: SHIPPED | OUTPATIENT
Start: 2022-09-30

## 2022-09-30 ASSESSMENT — PAIN DESCRIPTION - ORIENTATION: ORIENTATION: LEFT

## 2022-09-30 ASSESSMENT — PAIN DESCRIPTION - LOCATION: LOCATION: CHEST

## 2022-09-30 ASSESSMENT — PAIN SCALES - GENERAL: PAINLEVEL_OUTOF10: 5

## 2022-09-30 ASSESSMENT — PAIN DESCRIPTION - PAIN TYPE: TYPE: ACUTE PAIN

## 2022-09-30 ASSESSMENT — PAIN - FUNCTIONAL ASSESSMENT: PAIN_FUNCTIONAL_ASSESSMENT: 0-10

## 2022-09-30 ASSESSMENT — PAIN DESCRIPTION - DESCRIPTORS: DESCRIPTORS: PRESSURE

## 2022-09-30 NOTE — ED NOTES
Department of Emergency Medicine    FIRST PROVIDER TRIAGE NOTE             Independent MLP           9/30/22  2:56 PM EDT    Date of Encounter: 9/30/22   MRN: [id number]    Vitals:    09/30/22 1506   BP: (!) 131/95   Pulse: 96   Resp: 16   Temp: 98 °F (36.7 °C)   TempSrc: Temporal   SpO2: 94%   Weight: 148 lb (67.1 kg)   Height: 5' 5\" (1.651 m)      HPI: Tabitha Gonzales is a 33 y.o. male who presents to the ED for Chest Pain (Pt 4 days post op sinus surgery), Shortness of Breath, and Dizziness     Post op sinus surgery -  [facility]     ROS: Negative for abd pain or fever. Physical Exam:   Gen Appearance/Constitutional: alert  CV: tachycardia     Initial Plan of Care: All treatment areas with department are currently occupied. Plan to order/Initiate the following while awaiting opening in ED: labs, EKG, and imaging studies.     Initial Plan of Care: Initiate Treatment-Testing, Proceed toTreatment Area When Bed Available for ED Attending/MLP to Continue Care    Electronically signed by Carlos Oliveira PA-C   DD: 9/30/22       Carlos Oliveira PA-C  09/30/22 8909

## 2022-09-30 NOTE — ED PROVIDER NOTES
Brent Steve is a 33 y.o. male presenting to the ED for chest pain, hemoptysis but likely from epistaxis, beginning wed ago. The complaint has been intermittent, moderate in severity, and worsened by nothing. 34 yo m who states he had recent sinus surgery on Mondayhad had epistaxis still has stents, woke up wed and noted his chest was burning and felt nauseatedpt notes pain does go to [facility], he notes worst with twisting and movement. Note. Pt notes he has been coughing associated with it. He also notes bodyaches and mylagias. Patient requesting antibiotics for sinus congestion post sinus surgery, ENT dr Dena Vásquez pt is anxious    Review of Systems:   Review of Systems   Constitutional:  Negative for appetite change, fatigue and fever. HENT:  Positive for congestion and nosebleeds. Eyes:  Negative for photophobia and visual disturbance. Respiratory:  Negative for cough and shortness of breath. Cardiovascular:  Positive for chest pain. Negative for palpitations and leg swelling. Gastrointestinal:  Negative for abdominal pain and diarrhea. Endocrine: Negative for polyphagia and polyuria. Genitourinary:  Negative for difficulty urinating and dysuria. Musculoskeletal:  Positive for myalgias. Negative for back pain. Skin:  Negative for pallor and rash. Allergic/Immunologic: Negative for food allergies and immunocompromised state. Neurological:  Negative for dizziness and headaches. Hematological:  Negative for adenopathy. Does not bruise/bleed easily. Psychiatric/Behavioral:  Negative for agitation. The patient is nervous/anxious.              --------------------------------------------- PAST HISTORY ---------------------------------------------  Past Medical History:  has a past medical history of Acid reflux, Alcohol related seizure ([address] Cephalgia, Erectile dysfunction, and Sinus disorder.     Past Surgical History:  has a past surgical history that includes Cystoscopy (2008); Upper gastrointestinal endoscopy (N/A, 5/3/2022); and Sinus endoscopy (N/A, 9/26/2022). Social History:  reports that he has been smoking cigarettes. He has a 3.00 pack-year smoking history. He has never used smokeless tobacco. He reports that he does not currently use alcohol after a past usage of about 15.0 standard drinks per week. He reports that he does not currently use drugs after having used the following drugs: Cocaine. Frequency: 2.00 times per week. Family History: family history includes High Cholesterol in his father. The patients home medications have been reviewed.     Allergies: Cefazolin and Cefprozil    -------------------------------------------------- RESULTS -------------------------------------------------  All laboratory and radiology results have been personally reviewed by myself   LABS:  Results for orders placed or performed during the hospital encounter of 09/30/22   COVID-19, Rapid    Specimen: Nasopharyngeal Swab   Result Value Ref Range    SARS-CoV-2, NAAT Not Detected Not Detected   CBC with Auto Differential   Result Value Ref Range    WBC 5.2 4.5 - 11.5 E9/L    RBC 4.32 3.80 - 5.80 E12/L    Hemoglobin 13.2 12.5 - 16.5 g/dL    Hematocrit 38.6 37.0 - 54.0 %    MCV 89.4 80.0 - 99.9 fL    MCH 30.6 26.0 - 35.0 pg    MCHC 34.2 32.0 - 34.5 %    RDW 11.8 11.5 - 15.0 fL    Platelets 423 296 - 823 E9/L    MPV 10.0 7.0 - 12.0 fL    Neutrophils % 63.6 43.0 - 80.0 %    Immature Granulocytes % 0.2 0.0 - 5.0 %    Lymphocytes % 24.4 20.0 - 42.0 %    Monocytes % 10.4 2.0 - 12.0 %    Eosinophils % 1.0 0.0 - 6.0 %    Basophils % 0.4 0.0 - 2.0 %    Neutrophils Absolute 3.31 1.80 - 7.30 E9/L    Immature Granulocytes # 0.01 E9/L    Lymphocytes Absolute 1.27 (L) 1.50 - 4.00 E9/L    Monocytes Absolute 0.54 0.10 - 0.95 E9/L    Eosinophils Absolute 0.05 0.05 - 0.50 E9/L    Basophils Absolute 0.02 0.00 - 0.20 T7/D   Basic Metabolic Panel w/ Reflex to MG   Result Value Ref Range Sodium 134 132 - 146 mmol/L    Potassium reflex Magnesium 4.4 3.5 - 5.0 mmol/L    Chloride 98 98 - 107 mmol/L    CO2 27 22 - 29 mmol/L    Anion Gap 9 7 - 16 mmol/L    Glucose 96 74 - 99 mg/dL    BUN 7 6 - 20 mg/dL    Creatinine 0.8 0.7 - 1.2 mg/dL    GFR Non-African American >60 >=60 mL/min/1.73    GFR African American >60     Calcium 10.0 8.6 - 10.2 mg/dL   Troponin   Result Value Ref Range    Troponin, High Sensitivity <6 0 - 11 ng/L   Brain Natriuretic Peptide   Result Value Ref Range    Pro-BNP 14 0 - 125 pg/mL   D-Dimer, Quantitative   Result Value Ref Range    D-Dimer, Quant <200 ng/mL DDU   EKG 12 Lead   Result Value Ref Range    Ventricular Rate 87 BPM    Atrial Rate 87 BPM    P-R Interval 152 ms    QRS Duration 88 ms    Q-T Interval 358 ms    QTc Calculation (Bazett) 430 ms    P Axis 72 degrees    R Axis 45 degrees    T Axis 62 degrees       RADIOLOGY:  Interpreted by Radiologist.  XR CHEST (2 VW)   Final Result   No acute process. ------------------------- NURSING NOTES AND VITALS REVIEWED ---------------------------   The nursing notes within the ED encounter and vital signs as below have been reviewed. /84   Pulse 91   Temp 98 °F (36.7 °C) (Temporal)   Resp 16   Ht 5' 5\" (1.651 m)   Wt 148 lb (67.1 kg)   SpO2 96%   BMI 24.63 kg/m²   Oxygen Saturation Interpretation: Normal      ---------------------------------------------------PHYSICAL EXAM--------------------------------------    Physical Exam  Vitals reviewed. Constitutional:       General: He is not in acute distress. Appearance: Normal appearance. He is not toxic-appearing. HENT:      Head: Normocephalic and atraumatic. Right Ear: External ear normal.      Left Ear: External ear normal.      Nose: Nose normal. No congestion. Mouth/Throat:      Mouth: Mucous membranes are moist.      Pharynx: Oropharynx is clear. No posterior oropharyngeal erythema.    Eyes:      Extraocular Movements: Extraocular movements intact. Pupils: Pupils are equal, round, and reactive to light. Cardiovascular:      Rate and Rhythm: Normal rate and regular rhythm. Pulses: Normal pulses. Heart sounds: No murmur heard. Pulmonary:      Effort: Pulmonary effort is normal.      Breath sounds: No wheezing or rhonchi. Chest:      Chest wall: No tenderness. Abdominal:      General: Bowel sounds are normal.      Tenderness: There is no abdominal tenderness. There is no right CVA tenderness, left CVA tenderness or guarding. Musculoskeletal:         General: No swelling or deformity. Cervical back: Normal range of motion and neck supple. No muscular tenderness. Skin:     General: Skin is warm and dry. Capillary Refill: Capillary refill takes less than 2 seconds. Neurological:      General: No focal deficit present. Mental Status: He is alert and oriented to person, place, and time. Psychiatric:         Mood and Affect: Mood normal.             ------------------------------ ED COURSE/MEDICAL DECISION MAKING----------------------  Medications - No data to display  EKG: This EKG is signed and interpreted by me. Time:30-SEP-2022 15:04:45  Rate: 87  Rhythm: Sinus  Interpretation: left atrial enlargement, normal axis, normal pr, qrs, qtc, RSR v1 no ischemic findings  Comparison: stable as compared to patient's most recent EKG 4/28/22          ED COURSE:       Medical Decision Making:    Patient presented with atypical chest discomfort. He has status post sinus surgery. He is also noted since sinus surgery he has had sinus congestion and intermittent epistaxis. He is not having any exertional chest pain. His D-dimer is unremarkable. Troponins normal.  EKG shows no ischemic findings. Heart score is low risk. Recommend patient follow-up with a PCP next week for reevaluation and further management. I also recommended patient follow-up with ENT.   He is requesting antibiotics for sinus congestion I do not feel that is appropriate at this time. He needs to discuss that with his ENT. I have no evidence that he has any postoperative infection. We discussed warning signs symptoms for when to return. He is to call PCP and ENT on Monday to be seen next week. Patient verbalized understanding of all instructions    I did discuss warning signs for when to return to the Emergency Room, and the patient verbalized understanding      Counseling: The emergency provider has spoken with the patient and discussed todays results, in addition to providing specific details for the plan of care and counseling regarding the diagnosis and prognosis. Questions are answered at this time and they are agreeable with the plan.      --------------------------------- IMPRESSION AND DISPOSITION ---------------------------------    IMPRESSION  1. Chest pain, unspecified type        DISPOSITION  Disposition: Discharge to home  Patient condition is good      NOTE: This report was transcribed using voice recognition software.  Every effort was made to ensure accuracy; however, inadvertent computerized transcription errors may be present       Valdemar Remy DO  10/01/22 0100

## 2022-09-30 NOTE — Clinical Note
Kulwinder Veloz was seen and treated in our emergency department on 9/30/2022. He may return to work on 10/04/2022. If you have any questions or concerns, please don't hesitate to call.       Steve Soriano, DO

## 2022-09-30 NOTE — TELEPHONE ENCOUNTER
Patient called in stating on Wednesday he started having chest pains and now he is having sob  and feels like his head is on fire along with the chest pains. Patient had sinus sx on 9/26. MA advised patient to go to the ED to be evaluated as  [address] [place] [address] is out of the office. Patient understood.      Electronically signed by Lisa Truong MA on 9/30/22 at 2:32 PM EDT

## 2022-10-01 ASSESSMENT — ENCOUNTER SYMPTOMS
ABDOMINAL PAIN: 0
DIARRHEA: 0
BACK PAIN: 0
SHORTNESS OF BREATH: 0
COUGH: 0
PHOTOPHOBIA: 0

## 2022-10-04 ENCOUNTER — OFFICE VISIT (OUTPATIENT)
Dept: ENT CLINIC | Age: 32
End: 2022-10-04

## 2022-10-04 VITALS
OXYGEN SATURATION: 96 % | SYSTOLIC BLOOD PRESSURE: 132 MMHG | HEIGHT: 65 IN | WEIGHT: 148 LBS | DIASTOLIC BLOOD PRESSURE: 90 MMHG | TEMPERATURE: 97.9 F | BODY MASS INDEX: 24.66 KG/M2 | HEART RATE: 95 BPM

## 2022-10-04 DIAGNOSIS — Z98.890 S/P NASAL SEPTOPLASTY: ICD-10-CM

## 2022-10-04 DIAGNOSIS — Z98.890 S/P FESS (FUNCTIONAL ENDOSCOPIC SINUS SURGERY): Primary | ICD-10-CM

## 2022-10-04 PROCEDURE — 99024 POSTOP FOLLOW-UP VISIT: CPT | Performed by: OTOLARYNGOLOGY

## 2022-10-04 RX ORDER — DOXYCYCLINE HYCLATE 100 MG
100 TABLET ORAL 2 TIMES DAILY
Qty: 14 TABLET | Refills: 0 | Status: SHIPPED | OUTPATIENT
Start: 2022-10-04 | End: 2022-10-11

## 2022-10-04 RX ORDER — METHYLPREDNISOLONE 4 MG/1
TABLET ORAL
Qty: 1 KIT | Refills: 0 | Status: SHIPPED | OUTPATIENT
Start: 2022-10-04 | End: 2022-10-10

## 2022-10-04 NOTE — PROGRESS NOTES
10/4/2022    Subjective:    Patient ID:  Cassy Villegas is a 33 y.o. male. HPI Comments: Pt returns today for followup, s/p FESS, Septo, SMRITS and review of path report. Pt is not doing well. There are problems from the surgical site. Pt is congested from splints. No bleeding. Nasty drainage from his nose. Review of Systems   HENT: Positive for congestion and rhinorrhea. All other systems reviewed and are negative. Objective:    Physical Exam   Constitutional: She is oriented to person, place, and time. She appears well-developed and well-nourished. HENT:   Head: Normocephalic and atraumatic. Right Ear: Hearing, tympanic membrane, external ear and ear canal normal.   Left Ear: Hearing, tympanic membrane, external ear and ear canal normal.   Nose: Rhinorrhea present. Mouth/Throat: Uvula is midline, oropharynx is clear and moist and mucous membranes are normal.   Septal splints in place - removed with no difficulty. Septum is straight    Crusting removed from both nasal cavities. Eyes: Conjunctivae and EOM are normal. Pupils are equal, round, and reactive to light. Neck: Normal range of motion. Neck supple. Cardiovascular: Normal rate, regular rhythm and normal heart sounds. Pulmonary/Chest: Effort normal and breath sounds normal.   Abdominal: Soft. Bowel sounds are normal.   Neurological: She is alert and oriented to person, place, and time. Vitals reviewed. Pathology:  none    Assessment:       Diagnosis Orders   1. S/P FESS (functional endoscopic sinus surgery)        2. S/P nasal septoplasty              Plan:      - Will prescribe doxycycline given his allergy to augmentin and medrol dosepack for suspected sinusitis  - continue daily saline rinses    Follow up one month    Electronically signed by Lulú Grimm DO on 10/4/2022 at 1:49 PM          Cassy Villegas  1990    I have discussed the case, including pertinent history and exam findings with the resident.  I have seen and examined the patient and the key elements of the encounter have been performed by me. I agree with the assessment, plan and orders as documented by the  resident              Remainder of medical problems as per  resident note. Patient seen and examined. Agree with above exam, assessment and plan.       Electronically signed by Bryan Gusman DO on 10/10/22 at 8:44 AM EDT

## 2022-10-05 ENCOUNTER — TELEPHONE (OUTPATIENT)
Dept: CARDIOLOGY CLINIC | Age: 32
End: 2022-10-05

## 2022-10-05 NOTE — TELEPHONE ENCOUNTER
Patient is calling regarding follow-up. He stated he had SVT after surgery and then to ER with this, and needs follow-up.  Please advise

## 2022-10-17 ENCOUNTER — TELEPHONE (OUTPATIENT)
Dept: ENT CLINIC | Age: 32
End: 2022-10-17

## 2022-10-18 NOTE — TELEPHONE ENCOUNTER
Called patient to discuss current symptoms feels stuffy finished doxycycline and rash has developed advised seeing pcp and call back.

## 2022-11-11 ENCOUNTER — TELEPHONE (OUTPATIENT)
Dept: ENT CLINIC | Age: 32
End: 2022-11-11

## 2022-11-11 NOTE — TELEPHONE ENCOUNTER
Patient called into the office states he feels mucus, burning sensation above mouth, uses nasal saline 2 times daily. Patient states he finished antibiotics 2 weeks ago showing no relief. Patient feels something caught in nasal cavity, miserable. Pcp stated to have patient scoped due to sinus cavity and head and green drainage.

## 2022-11-29 ENCOUNTER — HOSPITAL ENCOUNTER (EMERGENCY)
Age: 32
Discharge: HOME | End: 2022-11-29
Payer: MEDICARE

## 2022-11-29 ENCOUNTER — APPOINTMENT (OUTPATIENT)
Dept: CT IMAGING | Age: 32
End: 2022-11-29
Payer: MEDICARE

## 2022-11-29 VITALS
SYSTOLIC BLOOD PRESSURE: 132 MMHG | OXYGEN SATURATION: 99 % | HEIGHT: 65 IN | HEART RATE: 96 BPM | BODY MASS INDEX: 23.32 KG/M2 | TEMPERATURE: 98.6 F | DIASTOLIC BLOOD PRESSURE: 90 MMHG | RESPIRATION RATE: 16 BRPM | WEIGHT: 140 LBS

## 2022-11-29 DIAGNOSIS — J32.0 LEFT MAXILLARY SINUSITIS: Primary | ICD-10-CM

## 2022-11-29 PROCEDURE — 99284 EMERGENCY DEPT VISIT MOD MDM: CPT

## 2022-11-29 PROCEDURE — 6370000000 HC RX 637 (ALT 250 FOR IP): Performed by: PHYSICIAN ASSISTANT

## 2022-11-29 PROCEDURE — 70486 CT MAXILLOFACIAL W/O DYE: CPT

## 2022-11-29 RX ORDER — METHYLPREDNISOLONE 4 MG/1
2 TABLET ORAL DAILY
Qty: 3 TABLET | Refills: 0 | Status: SHIPPED | OUTPATIENT
Start: 2022-11-29 | End: 2022-12-05

## 2022-11-29 RX ORDER — AMOXICILLIN AND CLAVULANATE POTASSIUM 875; 125 MG/1; MG/1
1 TABLET, FILM COATED ORAL 2 TIMES DAILY
Qty: 20 TABLET | Refills: 0 | Status: SHIPPED | OUTPATIENT
Start: 2022-11-29 | End: 2022-12-09

## 2022-11-29 RX ORDER — AMOXICILLIN AND CLAVULANATE POTASSIUM 875; 125 MG/1; MG/1
1 TABLET, FILM COATED ORAL ONCE
Status: COMPLETED | OUTPATIENT
Start: 2022-11-29 | End: 2022-11-29

## 2022-11-29 RX ADMIN — AMOXICILLIN AND CLAVULANATE POTASSIUM 1 TABLET: 875; 125 TABLET, FILM COATED ORAL at 21:15

## 2022-11-29 NOTE — ED PROVIDER NOTES
[address] [facility]                                                                                                                                    Department of Emergency Medicine   ED  Provider Note  Admit Date/RoomTime: 11/29/2022  6:39 PM  ED Room: [facility]        HPI:  11/29/22,   Time: 6:48 PM JACINTO Barfield is a 33 y.o. male presenting to the ED for concern for FB left sinus, beginning few months ago. The complaint has been persistent, moderate in severity, and worsened by nothing. The patient states he is concerned about FB in left sinus. Reports he had sinus surgery by Dr. Ayala Neri in Sept.  States afterward he did go back to office and have stents removed. The patient believes there was something left in the left nare after that time. States he could feel it. For awhile he tried to get it out on his own. Ultimately he thinks he sucked it back into the left maxillary sinus. States he has pain at the site and feels like his face is swelling and having a reaction. Mild itching and pressure reported as well. States he has been trying to get in touch with the ENT office for months. Finally has appointment now to be seen in late December. Denies fever or chills.           ROS:     Constitutional: Negative for fever and chills  HENT:  See HPI  Eyes: Negative for pain, discharge and redness  Respiratory:  Negative for shortness of breath, cough and wheezing  Cardiovascular: Negative for CP, edema or palpitations  Gastrointestinal: Negative for nausea, vomiting, diarrhea and abdominal distention  Genitourinary: Negative for dysuria and frequency  Musculoskeletal: Negative for back pain and arthralgia  Skin: Negative for rash and wound  Neurological: Negative for weakness and headaches  Hematological: Negative for adenopathy    All other systems reviewed and are negative      -------------------------------- PAST HISTORY ----------------------------------  Past Medical History: has a past medical history of Acid reflux, Alcohol related seizure ([address] Cephalgia, Erectile dysfunction, and Sinus disorder. Past Surgical History:  has a past surgical history that includes Cystoscopy (2008); Upper gastrointestinal endoscopy (N/A, 5/3/2022); and Sinus endoscopy (N/A, 9/26/2022). Social History:  reports that he has been smoking cigarettes. He has a 3.00 pack-year smoking history. He has never used smokeless tobacco. He reports that he does not currently use alcohol after a past usage of about 15.0 standard drinks per week. He reports that he does not currently use drugs after having used the following drugs: Cocaine. Frequency: 2.00 times per week. Family History: family history includes High Cholesterol in his father. The patients home medications have been reviewed. Allergies: Cefazolin and Cefprozil    --------------------------------- RESULTS ------------------------------------------  All laboratory and radiology results have been personally reviewed by myself   LABS:  No results found for this visit on 11/29/22. RADIOLOGY:  Interpreted by Radiologist.  Dashawn Phipps   Final Result   1. New mass located in left maxillary sinus could indicate a mucous retention   cyst or polyp measuring 2.1 x 1.7 cm.   2. Mucosal thickening involving bilateral maxillary sinuses, ethmoid sinuses,   and sphenoid sinuses suggesting sinusitis. 3. Evidence of surgery since prior related to bilateral maxillary   antrostomies. 4. No evidence of radiopaque foreign body. ----------------- NURSING NOTES AND VITALS REVIEWED ---------------   The nursing notes within the ED encounter and vital signs as below have been reviewed.    BP (!) 132/90   Pulse 96   Temp 98.6 °F (37 °C) (Oral)   Resp 16   Ht 5' 5\" (1.651 m)   Wt 140 lb (63.5 kg)   SpO2 99%   BMI 23.30 kg/m²   Oxygen Saturation Interpretation: Normal      --------------------------------PHYSICAL EXAM------------------------------------      Constitutional/General: Alert and oriented x3, well appearing, non toxic in NAD  Head: NC/AT  Eyes: PERRL, EOMI  Nasal septum clear, normal appearing turbinate. No visible FB. Posterior pharynx clear. Mouth: Oropharynx clear, handling secretions, no trismus  Neck: Supple, full ROM, no meningeal signs  Pulmonary: Lungs clear to auscultation bilaterally, no wheezes, rales, or rhonchi. Not in respiratory distress  Cardiovascular:  Regular rate and rhythm, no murmurs, gallops, or rubs. 2+ distal pulses  Extremities: Moves all extremities x 4. Warm and well perfused  Skin: warm and dry without rash  Neurologic: GCS 15,  Intact. No focal deficits  Psych: Normal Affect      ------------------------ ED COURSE/MEDICAL DECISION MAKING----------------------  Medications   amoxicillin-clavulanate (AUGMENTIN) 875-125 MG per tablet 1 tablet (has no administration in time range)         Medical Decision Making:    The patient has and ill-defined polyp or mass in the left maxillary sinus. He has diffuse changes of sinusitis in several different areas. I reviewed these findings with the ENT resident, Dr. Andree Marquez. She would like me to give the patient some Augmentin and steroids for home. States that she will reach out to Dr. Yasmeen Wiggins office in the a.m. I explained all of these results with the patient. Apparently he has an impacted tooth in that left maxillary region as well. I encouraged him to keep his appointment for follow-up and to take the prescriptions as directed. Counseling: The emergency provider has spoken with the patient and discussed todays results, in addition to providing specific details for the plan of care and counseling regarding the diagnosis and prognosis. Questions are answered at this time and they are agreeable with the plan.      ------------------------ IMPRESSION AND DISPOSITION -------------------------------    IMPRESSION  1.  Left maxillary sinusitis        DISPOSITION  Disposition: Discharge to home  Patient condition is stable                    Fatmata Olivier PA-C  11/29/22 2119

## 2022-11-30 ENCOUNTER — TELEPHONE (OUTPATIENT)
Dept: ENT CLINIC | Age: 32
End: 2022-11-30

## 2022-11-30 NOTE — TELEPHONE ENCOUNTER
----- Message from Sue Young DO sent at 11/29/2022  9:01 PM EST -----  Patient was seen in ED for persistent sinus complaints, CT evidence of sinus disease recurrent. He has appointment on 12/22 but would just call to check in on him and see how he is doing. He was upset he cant get an appointment sooner. I explained to him that we are treating him medically and that his 12/22 appointment is appropriate. but we will provide him some resurrance since he had sinus surgery 9/26    Thank you guys!   Mitchel Fortune

## 2022-12-02 NOTE — TELEPHONE ENCOUNTER
Spoke to patient-he is still concerned with nasal congestion and bleeding from the L side of his nose. Patient is flushing nose BID. LM for Dr Donte Renee.

## 2022-12-05 NOTE — TELEPHONE ENCOUNTER
Advise to finish course of antibiotics, prednisone and continue BID nasal saline irrigations. CT Sinus reviewed again which shows acute on chronic mild sinus disease, mucus retention cyst. No evidence to suggest acute ENT intervention. Alternate between tylenol/Motrin Q4hrs prn for pain. Keep follow up appointment with Dr Drake Hines. If symptoms worse may be seen sooner or go to the ED for evaluation.

## 2022-12-06 NOTE — TELEPHONE ENCOUNTER
Patient would like to know if Dr can clear out of the L side of nose. Patient cannot breath out of L side of nostril. No flow. No drainage. Patient had issues with this since October and doesn't want to suffer until next apt. Please advise.

## 2022-12-07 NOTE — TELEPHONE ENCOUNTER
We can evaluate patients nose in the office under direct visualization and clean the nose out if necessary at the office visit

## 2022-12-22 ENCOUNTER — OFFICE VISIT (OUTPATIENT)
Dept: ENT CLINIC | Age: 32
End: 2022-12-22
Payer: MEDICARE

## 2022-12-22 VITALS
OXYGEN SATURATION: 98 % | BODY MASS INDEX: 23.32 KG/M2 | HEART RATE: 73 BPM | WEIGHT: 140 LBS | DIASTOLIC BLOOD PRESSURE: 99 MMHG | TEMPERATURE: 97.7 F | HEIGHT: 65 IN | SYSTOLIC BLOOD PRESSURE: 139 MMHG

## 2022-12-22 DIAGNOSIS — Z98.890 S/P SINUS SURGERY: Primary | ICD-10-CM

## 2022-12-22 DIAGNOSIS — J32.4 CHRONIC PANSINUSITIS: ICD-10-CM

## 2022-12-22 PROCEDURE — G8484 FLU IMMUNIZE NO ADMIN: HCPCS | Performed by: OTOLARYNGOLOGY

## 2022-12-22 PROCEDURE — G8427 DOCREV CUR MEDS BY ELIG CLIN: HCPCS | Performed by: OTOLARYNGOLOGY

## 2022-12-22 PROCEDURE — 99213 OFFICE O/P EST LOW 20 MIN: CPT | Performed by: OTOLARYNGOLOGY

## 2022-12-22 PROCEDURE — G8420 CALC BMI NORM PARAMETERS: HCPCS | Performed by: OTOLARYNGOLOGY

## 2022-12-22 PROCEDURE — 4004F PT TOBACCO SCREEN RCVD TLK: CPT | Performed by: OTOLARYNGOLOGY

## 2022-12-22 PROCEDURE — 31575 DIAGNOSTIC LARYNGOSCOPY: CPT | Performed by: OTOLARYNGOLOGY

## 2022-12-22 NOTE — PROGRESS NOTES
1/3/2023    Subjective:    Patient ID:  Supriya Morales is a 33 y.o. male. HPI Comments: Pt returns today for followup, s/p FESS, Septo, SMRITS and review of path report. Pt is not doing well. There are problems from the surgical site. Pt is congested from splints. No bleeding. Nasty drainage from his nose. 12/22/22: Patient seen for follow up s/p FESS, septo, SMRITS in 10/2022. He reports initial improvement in his symptoms. In October he was treated with Doxycycline with no improvement. He has been treated with Augmentin and steroids twice since then. Mainly issues with fullness and congestion on the left. Review of Systems   HENT: Positive for congestion and rhinorrhea. All other systems reviewed and are negative. Objective:    Physical Exam   Constitutional: He is oriented to person, place, and time. She appears well-developed and well-nourished. HENT:   Head: Normocephalic and atraumatic. Right Ear: Hearing, tympanic membrane, external ear and ear canal normal.   Left Ear: Hearing, tympanic membrane, external ear and ear canal normal.   Nose: Rhinorrhea present. Mouth/Throat: Uvula is midline, oropharynx is clear and moist and mucous membranes are normal.     Septum is straight    Eyes: Conjunctivae and EOM are normal. Pupils are equal, round, and reactive to light. Neck: Normal range of motion. Neck supple. Cardiovascular: Normal rate, regular rhythm and normal heart sounds. Pulmonary/Chest: Effort normal and breath sounds normal.   Abdominal: Soft. Bowel sounds are normal.   Neurological: She is alert and oriented to person, place, and time. Vitals reviewed.      Pathology:  None    Endoscopy Procedure Note    Pre-operative Diagnosis: persistent maxillary pain  Post-operative Diagnosis: same  Indications: anterior rhinoscopy  Anesthesia: Lidocaine 2% and Demetri-Synephrine 1/2%  Endoscopy Type:  Flexible nasopharyngolaryngoscopy  Procedure Details   The flexible nasopharyngolaryngoscope was passed through the bilateral side(s) of the nose, and the nose, nasopharynx, oropharynx, hypopharynx and larynx were examined. Examination was performed during quiet respiration and with phonation. The following findings were noted. Findings:  Normal nasopharynx, normal epiglottis, normal tongue base, normal pyriform, normal TVC motion and mucosa, no subglottic masses or lesions. Congestion and rhinorrhea, no masses or foreign bodies present in the nasal cavity. Condition:  Stable  Complications:  None                             Assessment:       Diagnosis Orders   1. S/P sinus surgery        2. Chronic pansinusitis                Plan:      - continue daily saline rinses and Flonase    Keep previously scheduled appointment. Electronically signed by Siva Vasquez DO on 1/3/2023 at 3:53 PM          [place]  1990    I have discussed the case, including pertinent history and exam findings with the resident. I have seen and examined the patient and the key elements of the encounter have been performed by me. I agree with the assessment, plan and orders as documented by the  resident              Remainder of medical problems as per  resident note. Patient seen and examined. Agree with above exam, assessment and plan.       Electronically signed by Siva Vasquez DO on 1/3/23 at 3:52 PM EST

## 2023-01-03 ENCOUNTER — TELEPHONE (OUTPATIENT)
Dept: ENT CLINIC | Age: 33
End: 2023-01-03

## 2023-01-03 NOTE — TELEPHONE ENCOUNTER
Patient called in asking what sinuses Dr Lorrie De La Fuente reviewed at his last apt. Dr Lorrie De La Fuente said he looked at the maxillary, frontal, and sphenoid sinuses. Patient notified.

## 2023-02-07 ENCOUNTER — OFFICE VISIT (OUTPATIENT)
Dept: ENT CLINIC | Age: 33
End: 2023-02-07
Payer: MEDICAID

## 2023-02-07 VITALS
HEART RATE: 70 BPM | SYSTOLIC BLOOD PRESSURE: 123 MMHG | HEIGHT: 65 IN | DIASTOLIC BLOOD PRESSURE: 86 MMHG | TEMPERATURE: 97 F | BODY MASS INDEX: 23.32 KG/M2 | OXYGEN SATURATION: 99 % | WEIGHT: 140 LBS

## 2023-02-07 DIAGNOSIS — R51.9 FACIAL PAIN: ICD-10-CM

## 2023-02-07 DIAGNOSIS — Z98.890 S/P SINUS SURGERY: ICD-10-CM

## 2023-02-07 DIAGNOSIS — J32.4 CHRONIC PANSINUSITIS: ICD-10-CM

## 2023-02-07 DIAGNOSIS — Z98.890 S/P FESS (FUNCTIONAL ENDOSCOPIC SINUS SURGERY): Primary | ICD-10-CM

## 2023-02-07 PROCEDURE — 99213 OFFICE O/P EST LOW 20 MIN: CPT | Performed by: OTOLARYNGOLOGY

## 2023-02-07 RX ORDER — FLUTICASONE PROPIONATE 50 MCG
1 SPRAY, SUSPENSION (ML) NASAL DAILY
Qty: 16 G | Refills: 3 | Status: SHIPPED | OUTPATIENT
Start: 2023-02-07

## 2023-02-07 ASSESSMENT — ENCOUNTER SYMPTOMS
EYE DISCHARGE: 0
EYE PAIN: 0
RHINORRHEA: 0
VOMITING: 0
SINUS PRESSURE: 1
DIARRHEA: 0
COUGH: 0
ALLERGIC/IMMUNOLOGIC NEGATIVE: 1
BACK PAIN: 0
SORE THROAT: 0
SHORTNESS OF BREATH: 0

## 2023-02-07 NOTE — PROGRESS NOTES
Subjective:      Patient ID:  Kristofer Aaron is a 33 y.o. male. HPI:    Patient presents today for nasal burning and pain in the sinuses on the left. Condition has been present for 2 month(s). Past Medical History:   Diagnosis Date    Acid reflux     Alcohol related seizure ([address] 12/20/2021    Cephalgia 12/20/2021    Erectile dysfunction     Sinus disorder      Past Surgical History:   Procedure Laterality Date    CYSTOSCOPY  2008    SINUS ENDOSCOPY N/A 9/26/2022    FUNCTIONAL ENDOSCOPIC SINUS SURGERY SEPTOPLASTY SUBMUCOSAL RESECTION INFERIOR TURBINATES performed by Salvador Mckeon DO at [address] [facility] N/A 5/3/2022    EGD ESOPHAGOGASTRODUODENOSCOPY performed by Fredis Verde MD at [address] History   Problem Relation Age of Onset    High Cholesterol Father      Social History     Socioeconomic History    Marital status: Single     Spouse name: None    Number of children: None    Years of education: None    Highest education level: None   Tobacco Use    Smoking status: Every Day    Smokeless tobacco: Never    Tobacco comments:     vape   Vaping Use    Vaping Use: Some days    Substances: Nicotine, Flavoring    Passive vaping exposure: Yes   Substance and Sexual Activity    Alcohol use: Not Currently     Alcohol/week: 15.0 standard drinks     Types: 15 Cans of beer per week     Comment: stopped 1/16/22    Drug use: Not Currently     Frequency: 2.0 times per week     Types: Cocaine     Comment: last use 1/2022     Allergies   Allergen Reactions    Cefazolin Rash    Cefprozil Rash       Review of Systems   Constitutional:  Negative for chills and fever. HENT:  Positive for congestion, postnasal drip and sinus pressure. Negative for rhinorrhea, sneezing and sore throat. Eyes:  Negative for pain and discharge. Respiratory:  Negative for cough and shortness of breath. Cardiovascular:  Negative for chest pain.    Gastrointestinal:  Negative for diarrhea and vomiting. Genitourinary:  Negative for flank pain. Musculoskeletal:  Negative for back pain and neck pain. Skin:  Negative for rash. Allergic/Immunologic: Negative. Neurological:  Negative for syncope and headaches. All other systems reviewed and are negative. Objective:     Vitals:    02/07/23 1441   BP: 123/86   Pulse: 70   Temp: 97 °F (36.1 °C)   SpO2: 99%     Physical Exam  Constitutional:       Appearance: Normal appearance. He is normal weight. HENT:      Head: Normocephalic and atraumatic. Jaw: Tenderness and pain on movement present. Right Ear: Tympanic membrane, ear canal and external ear normal.      Left Ear: Tympanic membrane, ear canal and external ear normal.      Nose: Septal deviation present. Mouth/Throat:      Lips: Pink. Mouth: Mucous membranes are moist.      Pharynx: Oropharynx is clear. Comments: Dysphagia  Eyes:      General: Lids are normal.      Conjunctiva/sclera: Conjunctivae normal.      Pupils: Pupils are equal, round, and reactive to light. Cardiovascular:      Rate and Rhythm: Normal rate and regular rhythm. Pulses: Normal pulses. Heart sounds: Normal heart sounds. Pulmonary:      Effort: Pulmonary effort is normal.      Breath sounds: No stridor. No wheezing. Abdominal:      General: Abdomen is flat. Palpations: Abdomen is soft. Musculoskeletal:         General: No signs of injury. Normal range of motion. Cervical back: Normal range of motion and neck supple. Skin:     General: Skin is warm and dry. Neurological:      Mental Status: He is alert and oriented to person, place, and time. Psychiatric:         Attention and Perception: Attention and perception normal.         Mood and Affect: Mood normal.               Assessment:       Diagnosis Orders   1. S/P FESS (functional endoscopic sinus surgery)        2. S/P sinus surgery        3. Chronic pansinusitis        4.  Facial pain Plan:      I will get a Ct scan to evaluate the sinuses. and see if there is issues in the left sinus cavity.    Follow up in 1 month(s)

## 2023-02-08 ENCOUNTER — TELEPHONE (OUTPATIENT)
Dept: ENT CLINIC | Age: 33
End: 2023-02-08

## 2023-02-08 NOTE — TELEPHONE ENCOUNTER
[facility] to authorize order with patient insurance. Patient is scheduled for sinus CT with radiology on 3/9/23 @ 1:30pm. Patient has been notified of date and time and that they need to arrive at 1:00pm. Patient was informed he has no prep prior to procedure. Patient instructed to park in [facility] parking lot and report to registration. Detail voicemail left for patient.     Electronically signed by Mustapha Castaneda MA on 2/8/23 at 10:13 AM EST

## 2023-02-21 ENCOUNTER — HOSPITAL ENCOUNTER (OUTPATIENT)
Dept: CT IMAGING | Age: 33
Discharge: HOME | End: 2023-02-23
Payer: MEDICAID

## 2023-02-21 DIAGNOSIS — Z98.890 S/P FESS (FUNCTIONAL ENDOSCOPIC SINUS SURGERY): ICD-10-CM

## 2023-02-21 DIAGNOSIS — R51.9 FACIAL PAIN: ICD-10-CM

## 2023-02-21 DIAGNOSIS — Z98.890 S/P SINUS SURGERY: ICD-10-CM

## 2023-02-21 PROCEDURE — 70486 CT MAXILLOFACIAL W/O DYE: CPT

## 2023-02-22 ENCOUNTER — TELEPHONE (OUTPATIENT)
Dept: ENT CLINIC | Age: 33
End: 2023-02-22

## 2023-02-22 NOTE — TELEPHONE ENCOUNTER
Patient will need to contact medical records to have them transferred to other office. Notified Naina Dey to contact patient. Dr Linda Vivar notified of CANDIE.

## 2023-02-22 NOTE — TELEPHONE ENCOUNTER
Patient called in today stating that he is going to the  [facility] for a second opinion and he would like his medical records sent to them. [facility] fax #: [phone].     Electronically signed by Jose Miguel Florence on 2/22/2023 at 2:23 PM

## 2023-02-23 NOTE — TELEPHONE ENCOUNTER
Patient was informed that he would need to  contact the medical records dept. Pt expressed understanding. I gave patient medial records phone number.      Electronically signed by Maddy Back on 2/23/2023 at 8:49 AM

## 2023-06-29 ENCOUNTER — HOSPITAL ENCOUNTER (OUTPATIENT)
Age: 33
Discharge: HOME | End: 2023-07-01

## 2023-11-16 ENCOUNTER — HOSPITAL ENCOUNTER (OUTPATIENT)
Age: 33
Discharge: HOME | End: 2023-11-18

## 2023-11-22 LAB — SURGICAL PATHOLOGY REPORT: NORMAL

## 2024-01-08 ENCOUNTER — OFFICE VISIT (OUTPATIENT)
Dept: NEUROLOGY | Age: 34
End: 2024-01-08
Payer: MEDICAID

## 2024-01-08 VITALS
RESPIRATION RATE: 20 BRPM | WEIGHT: 140 LBS | BODY MASS INDEX: 23.32 KG/M2 | OXYGEN SATURATION: 99 % | HEIGHT: 65 IN | DIASTOLIC BLOOD PRESSURE: 86 MMHG | HEART RATE: 114 BPM | TEMPERATURE: 98.6 F | SYSTOLIC BLOOD PRESSURE: 132 MMHG

## 2024-01-08 DIAGNOSIS — R51.9 FACIAL PAIN: Primary | ICD-10-CM

## 2024-01-08 DIAGNOSIS — G44.86 CERVICOGENIC HEADACHE: ICD-10-CM

## 2024-01-08 PROCEDURE — 99214 OFFICE O/P EST MOD 30 MIN: CPT | Performed by: PHYSICIAN ASSISTANT

## 2024-01-08 NOTE — PROGRESS NOTES
[facility]  NEUROLOGY  TERELL Carbone, PA-  Phone: [phone]  Fax: [phone]       Dominic Pickard is a 33 y.o. right handed male     He presents for evaluation of various complaints - last seen in August 2022 and was to see [facility] and follow-up afterwards as needed.    He again presents with various somatic complaints.  Since he was last here he has had 3 sinus surgeries as well as wisdom teeth extraction.    He reports that after his first sinus surgery all of his symptoms resolved for approximately 2 months but then returned.  He then went to a different specialist and had 2 more surgeries with no relief.    He describes a constant head pressure like steam should be blowing out his ears.  The intensity waxes and wanes throughout the day but is overall constant.  The longer he works seems to intensify this feeling.  He reports that when he sits down after work he will feel a release of the pressure which will make him feel odd.    After his sinus surgery he had a burning pain from his nose to the back of his head.  This is since resolved.    He reports sensations of crawling over his scalp, also feels like the left side of his face and inside of his mouth are numb.  He feels like his palate on the left side does not move correctly and resulting in difficulty forming words.     He also reports symptoms of heart racing, shortness of breath, \"adrenaline surges\" stating that his fight or flight response occurs even when sitting watching TV.    He again appears very anxious.  Difficulty obtaining an accurate timeline and sequence of symptoms and events.  Is still not interested in taking medication \"unless he has to\" but requests several different tests to find a diagnosis.    I did discuss with him at length that I do not feel that additional neurologic testing is necessary at this time.  I feel that untreated anxiety may be contributing greatly to his symptoms.  If anything I feel he could be

## 2024-01-12 ENCOUNTER — TELEPHONE (OUTPATIENT)
Dept: ADMINISTRATIVE | Age: 34
End: 2024-01-12

## 2024-01-12 NOTE — TELEPHONE ENCOUNTER
Pt wants to know if he was referred to [facility]. Please call pt back. Thanks!

## 2024-01-18 ENCOUNTER — HOSPITAL ENCOUNTER (OUTPATIENT)
Dept: MRI IMAGING | Age: 34
Discharge: HOME | End: 2024-01-20
Payer: MEDICAID

## 2024-01-18 DIAGNOSIS — G44.86 CERVICOGENIC HEADACHE: ICD-10-CM

## 2024-01-18 PROCEDURE — 72141 MRI NECK SPINE W/O DYE: CPT

## 2024-01-26 ENCOUNTER — TELEPHONE (OUTPATIENT)
Dept: NEUROLOGY | Age: 34
End: 2024-01-26

## 2024-01-26 NOTE — TELEPHONE ENCOUNTER
----- Message from RADU Anaya sent at 1/19/2024  7:36 AM EST -----  Regarding: MRI C spine results  Please inform patient that the MRI of his neck shows mild to moderate central canal stenosis (narrowing) at C3-4 and some moderate neural foraminal narrowing (where the nerves exit the spine) around C6-7.     Would recommend referral to physical therapy for this. If he would like to see pain management or NSGY those referrals could be placed as well.     These findings could potentially be contributing to his headache symptoms- however does not explain several of his other symptoms.     Thank you     ----- Message -----  From: Yolie Shaver Incoming Radiant Results From [employer]  Sent: 1/18/2024  10:55 PM EST  To: RADU Anaya

## 2024-04-20 NOTE — TELEPHONE ENCOUNTER
Called and left a detailed voicemail for patient to discuss current symptoms.
Called patient to discuss current symptoms left a detailed voicemail.
Melinda Woodall spoke to  about recommendations.
Patient called back into the office to schedule ct results sooner.
Patient called in today to check in on the status of his CT scan results. He was informed that we do have the results and he is scheduled 7/12/22 to discuss them. Patient also requested information regarding our referral to neurology. He was given the name and phone number of the neurologist he was referred to. Patient reported that he is, as of lately, tired constantly, has intermittent chills and shakes, his head feels full, and he has body and muscle aches. Patient is requesting a referral to endocrinology from Dr. Frances Culp.      Electronically signed by Sari Cho on 6/15/2022 at 2:41 PM
Will speak to dr Judy Velazquez.
Previously Declined (within the last year)

## 2024-06-05 ENCOUNTER — PREP FOR PROCEDURE (OUTPATIENT)
Dept: SURGERY | Age: 34
End: 2024-06-05

## 2024-07-18 ENCOUNTER — OFFICE VISIT (OUTPATIENT)
Dept: ENDOCRINOLOGY | Age: 34
End: 2024-07-18
Payer: MEDICAID

## 2024-07-18 VITALS
WEIGHT: 137 LBS | SYSTOLIC BLOOD PRESSURE: 122 MMHG | OXYGEN SATURATION: 98 % | HEIGHT: 65 IN | HEART RATE: 75 BPM | DIASTOLIC BLOOD PRESSURE: 81 MMHG | BODY MASS INDEX: 22.82 KG/M2

## 2024-07-18 DIAGNOSIS — R51.9 NONINTRACTABLE HEADACHE, UNSPECIFIED CHRONICITY PATTERN, UNSPECIFIED HEADACHE TYPE: ICD-10-CM

## 2024-07-18 DIAGNOSIS — R94.6 ABNORMAL THYROID FUNCTION TEST: ICD-10-CM

## 2024-07-18 DIAGNOSIS — R53.83 OTHER FATIGUE: Primary | ICD-10-CM

## 2024-07-18 DIAGNOSIS — R00.2 HEART PALPITATIONS: ICD-10-CM

## 2024-07-18 PROCEDURE — 99204 OFFICE O/P NEW MOD 45 MIN: CPT | Performed by: CLINICAL NURSE SPECIALIST

## 2024-07-18 NOTE — PROGRESS NOTES
[employer]  [facility] Department of Endocrinology Diabetes and Metabolism   [address], [place], [place], [address]  Phone: [phone]  Fax: [phone]    Date of Service: 7/18/2024    Primary Care Physician: Mo Castano III, DO  Referring physician: Mo Castano III, DO  Provider: ELLEN Mckay     Reason for the visit:  Fatigue   Adrenaline rush       History of Present Illness:  The history is provided by the patient. No  was used. Accuracy of the patient data is excellent.  Dominic Pickard is a very pleasant 33 y.o. male seen today for evaluation of fatigue and periods of having what he describes adrenal rush   Patient states this all started after he had a sinus surgery a couple years ago  Fatigue is constant  Intermittent periods of \"adrenaline rush\"   Associated with headache at times and intermittent heart palpitation  HR and BP have been stable   Patient states he had a previous abnormal high TSH however repeat labs showed it was normal  Denies family history of thyroid disease        PAST MEDICAL HISTORY   Past Medical History:   Diagnosis Date    Acid reflux     Alcohol related seizure (HCC) 12/20/2021    Cephalgia 12/20/2021    Erectile dysfunction     Sinus disorder        PAST SURGICAL HISTORY   Past Surgical History:   Procedure Laterality Date    CYSTOSCOPY  2008    SINUS ENDOSCOPY N/A 9/26/2022    FUNCTIONAL ENDOSCOPIC SINUS SURGERY SEPTOPLASTY SUBMUCOSAL RESECTION INFERIOR TURBINATES performed by Mahesh Cardenas DO at [facility] OR    UPPER GASTROINTESTINAL ENDOSCOPY N/A 5/3/2022    EGD ESOPHAGOGASTRODUODENOSCOPY performed by Hudson Moffett MD at [facility] ENDOSCOPY       SOCIAL HISTORY   Tobacco:   reports that he has been smoking. He has never used smokeless tobacco.  Alcohol:   reports that he does not currently use alcohol after a past usage of about 15.0 standard drinks of alcohol per week.  Drugs:   reports that

## 2024-07-22 ENCOUNTER — TELEPHONE (OUTPATIENT)
Dept: ENDOCRINOLOGY | Age: 34
End: 2024-07-22

## 2024-07-22 NOTE — TELEPHONE ENCOUNTER
Pt already notified via [email or web address] message blood test will not affect any of the symptoms pt listed just a normal blood draw.

## 2024-07-22 NOTE — TELEPHONE ENCOUNTER
----- Message from Dominic Pickard sent at 7/22/2024  3:18 AM EDT -----  Regarding: Upcoming Blood Tests  Contact: [phone]  MELISSA Valdez. I'll probably be going to get all the blood work on Tuesday, due to my sleep schedule. Do any of the tests affect digestion, appetite, and weight? I'm just curious because after each of my 3 sinus surgeries, everything we talked about plus the bloating, digestion, and appetite would all get better temporarily. I'm not sure how or why the sinus surgeries helped with literally everything, or if it's all due to just an ongoing/ never ending infection or something. Like I said, I'm asking out of curiosity. Thank you!

## 2024-07-23 ENCOUNTER — HOSPITAL ENCOUNTER (OUTPATIENT)
Age: 34
Setting detail: SPECIMEN
Discharge: HOME | End: 2024-07-23
Payer: MEDICAID

## 2024-07-23 ENCOUNTER — HOSPITAL ENCOUNTER (OUTPATIENT)
Age: 34
Discharge: HOME | End: 2024-07-23

## 2024-07-23 DIAGNOSIS — R94.6 ABNORMAL THYROID FUNCTION TEST: ICD-10-CM

## 2024-07-23 DIAGNOSIS — R00.2 HEART PALPITATIONS: ICD-10-CM

## 2024-07-23 DIAGNOSIS — R51.9 NONINTRACTABLE HEADACHE, UNSPECIFIED CHRONICITY PATTERN, UNSPECIFIED HEADACHE TYPE: ICD-10-CM

## 2024-07-23 DIAGNOSIS — R53.83 OTHER FATIGUE: ICD-10-CM

## 2024-07-23 LAB
CORTIS SERPL-MCNC: 6.9 UG/DL (ref 2.7–18.4)
T4 FREE SERPL-MCNC: 1.1 NG/DL (ref 0.9–1.7)
TSH SERPL DL<=0.05 MIU/L-ACNC: 1.63 UIU/ML (ref 0.27–4.2)

## 2024-07-23 PROCEDURE — 86376 MICROSOMAL ANTIBODY EACH: CPT

## 2024-07-23 PROCEDURE — 82533 TOTAL CORTISOL: CPT

## 2024-07-23 PROCEDURE — 83835 ASSAY OF METANEPHRINES: CPT

## 2024-07-23 PROCEDURE — 84439 ASSAY OF FREE THYROXINE: CPT

## 2024-07-23 PROCEDURE — 36415 COLL VENOUS BLD VENIPUNCTURE: CPT

## 2024-07-23 PROCEDURE — 82384 ASSAY THREE CATECHOLAMINES: CPT

## 2024-07-23 PROCEDURE — 84443 ASSAY THYROID STIM HORMONE: CPT

## 2024-07-24 ENCOUNTER — TELEPHONE (OUTPATIENT)
Dept: ENDOCRINOLOGY | Age: 34
End: 2024-07-24

## 2024-07-24 DIAGNOSIS — R53.83 OTHER FATIGUE: Primary | ICD-10-CM

## 2024-07-24 NOTE — TELEPHONE ENCOUNTER
----- Message from ELLEN Mckay - CNS sent at 7/24/2024  8:23 AM EDT -----  Please call pt and inform him that cortisol level was done at 2 pm .  Cortisol needs collected BTW 8-9 am .  Please reorder cortisol level and have him obtain at 8-9 am     Please call pt and inform him that TFT's are normal.  Will await additional testing

## 2024-07-25 NOTE — TELEPHONE ENCOUNTER
Spoke to patient shonda wants labs done between 8 and 9  dont matter how mauch sleep he got , if still low he will get a stimm test    patient understood

## 2024-07-25 NOTE — TELEPHONE ENCOUNTER
He is a night owl, he usually falls asleep around 5-6am. The lab advised him he should get the cortisol level after sleeping. He woke up the day he had the lab at 12 and went to get the labs. He is wondering how much sleep he should have before going or if this even matters, just get it drawn between 8-9am

## 2024-07-26 LAB — THYROPEROXIDASE AB SERPL IA-ACNC: <4 IU/ML (ref 0–25)

## 2024-07-30 ENCOUNTER — HOSPITAL ENCOUNTER (OUTPATIENT)
Age: 34
Discharge: HOME | End: 2024-07-30
Payer: MEDICAID

## 2024-07-30 ENCOUNTER — TELEPHONE (OUTPATIENT)
Dept: ENDOCRINOLOGY | Age: 34
End: 2024-07-30

## 2024-07-30 DIAGNOSIS — E27.40 ADRENAL INSUFFICIENCY (HCC): ICD-10-CM

## 2024-07-30 DIAGNOSIS — R53.83 OTHER FATIGUE: Primary | ICD-10-CM

## 2024-07-30 DIAGNOSIS — R53.83 OTHER FATIGUE: ICD-10-CM

## 2024-07-30 LAB
CATECHOL/URINE INTERP: NORMAL
COLLECT DURATION TIME SPEC: 24 H
CORTIS SERPL-MCNC: 5.3 UG/DL (ref 2.7–18.4)
CREAT 24H UR-MCNC: 48 MG/DL
CREATININE URINE /24 HR: 1440 MG/D (ref 1000–2500)
DOPAMINE 24 HOUR URINE: 228 UG/D (ref 71–485)
DOPAMINE, URINE, PER VOLUME: 76 UG/L
DOPAMINE, URINE, RATIO TO CREATININE: 158 UG/G CRT (ref 0–250)
EPINEPHRINE 24 HOUR URINE: 6 UG/D (ref 1–14)
EPINEPHRINE, URINE, PER VOLUME: 2 UG/L
EPINEPHRINE, URINE, RATIO TO CREATININE: 4 UG/G CRT (ref 0–20)
NOREPINEPHRINE 24 HOUR URINE: 15 UG/D (ref 14–120)
NOREPINEPHRINE CREAT RATIO: 10 UG/G CRT (ref 0–45)
NOREPINEPHRINE, URINE, PER VOLUME: 5 UG/L
SPECIMEN VOL ?TM UR: 3000 ML

## 2024-07-30 PROCEDURE — 82533 TOTAL CORTISOL: CPT

## 2024-07-30 PROCEDURE — 36415 COLL VENOUS BLD VENIPUNCTURE: CPT

## 2024-07-30 RX ORDER — COSYNTROPIN 0.25 MG/ML
0.25 INJECTION, POWDER, FOR SOLUTION INTRAMUSCULAR; INTRAVENOUS ONCE
Qty: 250 MCG | Refills: 0 | Status: SHIPPED | OUTPATIENT
Start: 2024-07-30 | End: 2024-07-30

## 2024-07-30 NOTE — TELEPHONE ENCOUNTER
----- Message from ELLEN Mckay - CNS sent at 7/30/2024  8:03 AM EDT -----  Urine test was negative for pheo.  Excellent result

## 2024-08-01 ENCOUNTER — TELEPHONE (OUTPATIENT)
Dept: ENDOCRINOLOGY | Age: 34
End: 2024-08-01

## 2024-08-01 DIAGNOSIS — E27.40: Primary | ICD-10-CM

## 2024-08-01 LAB
COLLECT DURATION TIME SPEC: 24 H
CREAT 24H UR-MCNC: 48 MG/DL
CREATININE URINE /24 HR: 1440 MG/D (ref 1000–2500)
METANEPHRINE UF INTERPRETATION: NORMAL
METANEPHRINE UG/G CRE: 90 UG/G CRT (ref 0–300)
METANEPHRINES 24 HOUR URINE: 129 UG/D (ref 55–320)
METANEPHRINES, URINE (UMOL/L): 43 UG/L
NORMETANEPHRINE, (G/CRT): 85 UG/G CRT (ref 0–400)
NORMETANEPHRINE, (NMOL/DAY): 123 UG/D (ref 114–865)
NORMETANEPHRINES, NMOL/L: 41 UG/L
SPECIMEN VOL ?TM UR: 3000 ML

## 2024-08-01 RX ORDER — COSYNTROPIN 0.25 MG/ML
250 INJECTION, POWDER, FOR SOLUTION INTRAMUSCULAR; INTRAVENOUS ONCE
OUTPATIENT
Start: 2024-08-04 | End: 2024-08-04

## 2024-08-01 NOTE — TELEPHONE ENCOUNTER
----- Message from ELLEN Mckay - CNS sent at 7/30/2024  2:07 PM EDT -----  Cortisol is  low normal.  I advised pt to obtain Cosyntropin stim test to r/o AI .  Ordered

## 2024-08-02 ENCOUNTER — TELEPHONE (OUTPATIENT)
Dept: ENDOCRINOLOGY | Age: 34
End: 2024-08-02

## 2024-08-02 NOTE — TELEPHONE ENCOUNTER
----- Message from ELLEN Mckay - CNS sent at 8/2/2024  8:06 AM EDT -----  Please call patient and inform him 24-hour urine metanephrines were normal.  This is an excellent result ruling out pheochromocytoma

## 2024-08-05 ENCOUNTER — TELEPHONE (OUTPATIENT)
Dept: ENT CLINIC | Age: 34
End: 2024-08-05

## 2024-08-05 NOTE — TELEPHONE ENCOUNTER
Pt called office. He would like to speak with someone regarding his surgery he had with Dr. Cardenas in the past. LOV 2/23.   Please advise. Electronically signed by Dena Napier on 8/5/2024 at 1:01 PM

## 2024-09-13 ENCOUNTER — HOSPITAL ENCOUNTER (OUTPATIENT)
Dept: INFUSION THERAPY | Age: 34
Setting detail: INFUSION SERIES
Discharge: HOME | End: 2024-09-13
Payer: MEDICAID

## 2024-09-13 VITALS
HEART RATE: 89 BPM | RESPIRATION RATE: 16 BRPM | TEMPERATURE: 98.2 F | SYSTOLIC BLOOD PRESSURE: 121 MMHG | HEIGHT: 65 IN | OXYGEN SATURATION: 100 % | BODY MASS INDEX: 22.82 KG/M2 | DIASTOLIC BLOOD PRESSURE: 79 MMHG | WEIGHT: 137 LBS

## 2024-09-13 DIAGNOSIS — E27.40 ADRENAL INSUFFICIENCY (HCC): ICD-10-CM

## 2024-09-13 DIAGNOSIS — E27.40: Primary | ICD-10-CM

## 2024-09-13 LAB
CORTIS SERPL-MCNC: 14.2 UG/DL (ref 2.7–18.4)
CORTIS SERPL-MCNC: 19.2 UG/DL (ref 2.7–18.4)
CORTIS SERPL-MCNC: 22.3 UG/DL (ref 2.7–18.4)

## 2024-09-13 PROCEDURE — 6360000002 HC RX W HCPCS: Performed by: CLINICAL NURSE SPECIALIST

## 2024-09-13 PROCEDURE — 96374 THER/PROPH/DIAG INJ IV PUSH: CPT

## 2024-09-13 PROCEDURE — 82533 TOTAL CORTISOL: CPT

## 2024-09-13 RX ORDER — COSYNTROPIN 0.25 MG/ML
250 INJECTION, POWDER, FOR SOLUTION INTRAMUSCULAR; INTRAVENOUS ONCE
Status: CANCELLED | OUTPATIENT
Start: 2024-09-13 | End: 2024-09-13

## 2024-09-13 RX ORDER — COSYNTROPIN 0.25 MG/ML
250 INJECTION, POWDER, FOR SOLUTION INTRAMUSCULAR; INTRAVENOUS ONCE
Status: COMPLETED | OUTPATIENT
Start: 2024-09-13 | End: 2024-09-13

## 2024-09-13 RX ADMIN — COSYNTROPIN 250 MCG: 0.25 INJECTION, POWDER, LYOPHILIZED, FOR SOLUTION INTRAMUSCULAR; INTRAVENOUS at 09:07

## 2024-09-13 ASSESSMENT — PAIN SCALES - GENERAL: PAINLEVEL_OUTOF10: 7

## 2024-09-13 ASSESSMENT — PAIN DESCRIPTION - DESCRIPTORS: DESCRIPTORS: ACHING;DISCOMFORT;SHOOTING;SHARP

## 2024-09-13 ASSESSMENT — PAIN DESCRIPTION - LOCATION: LOCATION: NECK

## 2024-09-13 ASSESSMENT — PAIN DESCRIPTION - FREQUENCY: FREQUENCY: CONTINUOUS

## 2024-09-13 ASSESSMENT — PAIN DESCRIPTION - ORIENTATION: ORIENTATION: LEFT

## 2024-09-13 ASSESSMENT — PAIN DESCRIPTION - PAIN TYPE: TYPE: CHRONIC PAIN

## 2024-09-13 ASSESSMENT — PAIN - FUNCTIONAL ASSESSMENT: PAIN_FUNCTIONAL_ASSESSMENT: PREVENTS OR INTERFERES SOME ACTIVE ACTIVITIES AND ADLS

## 2024-09-16 ENCOUNTER — TELEPHONE (OUTPATIENT)
Dept: ENDOCRINOLOGY | Age: 34
End: 2024-09-16

## 2024-09-17 LAB — ACTH PLAS-MCNC: 38 PG/ML (ref 7–63)

## 2024-09-26 ENCOUNTER — HOSPITAL ENCOUNTER (OUTPATIENT)
Age: 34
Discharge: HOME | End: 2024-09-28

## 2024-10-01 LAB — SURGICAL PATHOLOGY REPORT: NORMAL

## 2024-10-14 ENCOUNTER — TELEPHONE (OUTPATIENT)
Dept: ENT CLINIC | Age: 34
End: 2024-10-14

## 2024-10-14 NOTE — TELEPHONE ENCOUNTER
Gwen called into the office states he was calling to see what kind of splints were placed during surgery advised patient can call in to medical records to obtain records of surgery. Patient wanted to know what kind of splints were placed during sinus surgery if it would show up on a CT scan advised foster splints were removed first week of surgery and plastic pieces in nasal cavity would fall out with in a month patient states having problems with one side of nasal cavity advised making appointment patient declined.

## 2025-01-24 ENCOUNTER — APPOINTMENT (OUTPATIENT)
Dept: ULTRASOUND IMAGING | Age: 35
End: 2025-01-24
Payer: MEDICAID

## 2025-01-24 ENCOUNTER — HOSPITAL ENCOUNTER (EMERGENCY)
Age: 35
Discharge: HOME | End: 2025-01-24
Payer: MEDICAID

## 2025-01-24 ENCOUNTER — APPOINTMENT (OUTPATIENT)
Dept: CT IMAGING | Age: 35
End: 2025-01-24
Payer: MEDICAID

## 2025-01-24 ENCOUNTER — HOSPITAL ENCOUNTER (OUTPATIENT)
Age: 35
Discharge: HOME | End: 2025-01-24
Payer: MEDICAID

## 2025-01-24 VITALS
DIASTOLIC BLOOD PRESSURE: 81 MMHG | HEIGHT: 65 IN | WEIGHT: 135 LBS | OXYGEN SATURATION: 98 % | BODY MASS INDEX: 22.49 KG/M2 | SYSTOLIC BLOOD PRESSURE: 124 MMHG | HEART RATE: 67 BPM | RESPIRATION RATE: 18 BRPM | TEMPERATURE: 98.3 F

## 2025-01-24 DIAGNOSIS — K59.00 CONSTIPATION, UNSPECIFIED CONSTIPATION TYPE: ICD-10-CM

## 2025-01-24 DIAGNOSIS — R07.9 CHEST PAIN, UNSPECIFIED TYPE: ICD-10-CM

## 2025-01-24 DIAGNOSIS — K76.0 HEPATIC STEATOSIS: Primary | ICD-10-CM

## 2025-01-24 DIAGNOSIS — R10.13 ABDOMINAL PAIN, EPIGASTRIC: ICD-10-CM

## 2025-01-24 LAB
ALBUMIN SERPL-MCNC: 4.8 G/DL (ref 3.5–5.2)
ALP SERPL-CCNC: 86 U/L (ref 40–129)
ALT SERPL-CCNC: 33 U/L (ref 0–40)
ANION GAP SERPL CALCULATED.3IONS-SCNC: 10 MMOL/L (ref 7–16)
AST SERPL-CCNC: 23 U/L (ref 0–39)
BASOPHILS # BLD: 0.01 K/UL (ref 0–0.2)
BASOPHILS # BLD: 0.01 K/UL (ref 0–0.2)
BASOPHILS NFR BLD: 0 % (ref 0–2)
BASOPHILS NFR BLD: 0 % (ref 0–2)
BILIRUB SERPL-MCNC: 0.8 MG/DL (ref 0–1.2)
BUN SERPL-MCNC: 9 MG/DL (ref 6–20)
CALCIUM SERPL-MCNC: 9.6 MG/DL (ref 8.6–10.2)
CHLORIDE SERPL-SCNC: 103 MMOL/L (ref 98–107)
CO2 SERPL-SCNC: 27 MMOL/L (ref 22–29)
CREAT SERPL-MCNC: 0.9 MG/DL (ref 0.7–1.2)
EKG ATRIAL RATE: 79 BPM
EKG P AXIS: 72 DEGREES
EKG P-R INTERVAL: 148 MS
EKG Q-T INTERVAL: 366 MS
EKG QRS DURATION: 92 MS
EKG QTC CALCULATION (BAZETT): 419 MS
EKG R AXIS: 61 DEGREES
EKG T AXIS: 65 DEGREES
EKG VENTRICULAR RATE: 79 BPM
EOSINOPHIL # BLD: 0.07 K/UL (ref 0.05–0.5)
EOSINOPHIL # BLD: 0.11 K/UL (ref 0.05–0.5)
EOSINOPHILS RELATIVE PERCENT: 1 % (ref 0–6)
EOSINOPHILS RELATIVE PERCENT: 2 % (ref 0–6)
ERYTHROCYTE [DISTWIDTH] IN BLOOD BY AUTOMATED COUNT: 11.7 % (ref 11.5–15)
ERYTHROCYTE [DISTWIDTH] IN BLOOD BY AUTOMATED COUNT: 11.7 % (ref 11.5–15)
GFR, ESTIMATED: >90 ML/MIN/1.73M2
GLUCOSE SERPL-MCNC: 81 MG/DL (ref 74–99)
HCT VFR BLD AUTO: 41.8 % (ref 37–54)
HCT VFR BLD AUTO: 43.7 % (ref 37–54)
HGB BLD-MCNC: 14.3 G/DL (ref 12.5–16.5)
HGB BLD-MCNC: 14.5 G/DL (ref 12.5–16.5)
IMM GRANULOCYTES # BLD AUTO: <0.03 K/UL (ref 0–0.58)
IMM GRANULOCYTES # BLD AUTO: <0.03 K/UL (ref 0–0.58)
IMM GRANULOCYTES NFR BLD: 0 % (ref 0–5)
IMM GRANULOCYTES NFR BLD: 0 % (ref 0–5)
LIPASE SERPL-CCNC: 44 U/L (ref 13–60)
LYMPHOCYTES NFR BLD: 2.03 K/UL (ref 1.5–4)
LYMPHOCYTES NFR BLD: 2.58 K/UL (ref 1.5–4)
LYMPHOCYTES RELATIVE PERCENT: 36 % (ref 20–42)
LYMPHOCYTES RELATIVE PERCENT: 42 % (ref 20–42)
MCH RBC QN AUTO: 30.6 PG (ref 26–35)
MCH RBC QN AUTO: 30.8 PG (ref 26–35)
MCHC RBC AUTO-ENTMCNC: 33.2 G/DL (ref 32–34.5)
MCHC RBC AUTO-ENTMCNC: 34.2 G/DL (ref 32–34.5)
MCV RBC AUTO: 90.1 FL (ref 80–99.9)
MCV RBC AUTO: 92.2 FL (ref 80–99.9)
MONOCYTES NFR BLD: 0.42 K/UL (ref 0.1–0.95)
MONOCYTES NFR BLD: 0.57 K/UL (ref 0.1–0.95)
MONOCYTES NFR BLD: 8 % (ref 2–12)
MONOCYTES NFR BLD: 9 % (ref 2–12)
NEUTROPHILS NFR BLD: 47 % (ref 43–80)
NEUTROPHILS NFR BLD: 54 % (ref 43–80)
NEUTS SEG NFR BLD: 2.89 K/UL (ref 1.8–7.3)
NEUTS SEG NFR BLD: 3.04 K/UL (ref 1.8–7.3)
PLATELET # BLD AUTO: 158 K/UL (ref 130–450)
PLATELET # BLD AUTO: 161 K/UL (ref 130–450)
PMV BLD AUTO: 10.3 FL (ref 7–12)
PMV BLD AUTO: 10.4 FL (ref 7–12)
POTASSIUM SERPL-SCNC: 4 MMOL/L (ref 3.5–5)
PROT SERPL-MCNC: 7.5 G/DL (ref 6.4–8.3)
RBC # BLD AUTO: 4.64 M/UL (ref 3.8–5.8)
RBC # BLD AUTO: 4.74 M/UL (ref 3.8–5.8)
SODIUM SERPL-SCNC: 140 MMOL/L (ref 132–146)
TROPONIN I SERPL HS-MCNC: <6 NG/L (ref 0–11)
WBC OTHER # BLD: 5.6 K/UL (ref 4.5–11.5)
WBC OTHER # BLD: 6.2 K/UL (ref 4.5–11.5)

## 2025-01-24 PROCEDURE — 96375 TX/PRO/DX INJ NEW DRUG ADDON: CPT

## 2025-01-24 PROCEDURE — 84484 ASSAY OF TROPONIN QUANT: CPT

## 2025-01-24 PROCEDURE — 2580000003 HC RX 258: Performed by: PHYSICIAN ASSISTANT

## 2025-01-24 PROCEDURE — 6360000002 HC RX W HCPCS: Performed by: PHYSICIAN ASSISTANT

## 2025-01-24 PROCEDURE — 96374 THER/PROPH/DIAG INJ IV PUSH: CPT

## 2025-01-24 PROCEDURE — 85025 COMPLETE CBC W/AUTO DIFF WBC: CPT

## 2025-01-24 PROCEDURE — 36415 COLL VENOUS BLD VENIPUNCTURE: CPT

## 2025-01-24 PROCEDURE — 6360000004 HC RX CONTRAST MEDICATION: Performed by: RADIOLOGY

## 2025-01-24 PROCEDURE — 99285 EMERGENCY DEPT VISIT HI MDM: CPT

## 2025-01-24 PROCEDURE — 76705 ECHO EXAM OF ABDOMEN: CPT

## 2025-01-24 PROCEDURE — 2500000003 HC RX 250 WO HCPCS: Performed by: PHYSICIAN ASSISTANT

## 2025-01-24 PROCEDURE — 74177 CT ABD & PELVIS W/CONTRAST: CPT

## 2025-01-24 PROCEDURE — 93005 ELECTROCARDIOGRAM TRACING: CPT | Performed by: PHYSICIAN ASSISTANT

## 2025-01-24 PROCEDURE — 93010 ELECTROCARDIOGRAM REPORT: CPT | Performed by: INTERNAL MEDICINE

## 2025-01-24 PROCEDURE — 83690 ASSAY OF LIPASE: CPT

## 2025-01-24 PROCEDURE — 80053 COMPREHEN METABOLIC PANEL: CPT

## 2025-01-24 RX ORDER — IOPAMIDOL 755 MG/ML
18 INJECTION, SOLUTION INTRAVASCULAR
Status: DISCONTINUED | OUTPATIENT
Start: 2025-01-24 | End: 2025-01-24 | Stop reason: HOSPADM

## 2025-01-24 RX ORDER — ONDANSETRON 2 MG/ML
4 INJECTION INTRAMUSCULAR; INTRAVENOUS ONCE
Status: COMPLETED | OUTPATIENT
Start: 2025-01-24 | End: 2025-01-24

## 2025-01-24 RX ORDER — IOPAMIDOL 755 MG/ML
75 INJECTION, SOLUTION INTRAVASCULAR
Status: COMPLETED | OUTPATIENT
Start: 2025-01-24 | End: 2025-01-24

## 2025-01-24 RX ADMIN — IOPAMIDOL 75 ML: 755 INJECTION, SOLUTION INTRAVENOUS at 16:24

## 2025-01-24 RX ADMIN — ONDANSETRON 4 MG: 2 INJECTION, SOLUTION INTRAMUSCULAR; INTRAVENOUS at 14:43

## 2025-01-24 RX ADMIN — FAMOTIDINE 20 MG: 10 INJECTION, SOLUTION INTRAVENOUS at 14:44

## 2025-01-24 ASSESSMENT — LIFESTYLE VARIABLES
HOW OFTEN DO YOU HAVE A DRINK CONTAINING ALCOHOL: NEVER
HOW MANY STANDARD DRINKS CONTAINING ALCOHOL DO YOU HAVE ON A TYPICAL DAY: PATIENT DOES NOT DRINK

## 2025-01-24 NOTE — ED PROVIDER NOTES
Independent CEASAR Visit.         Department of Emergency Medicine   ED  Provider Note  Admit Date/RoomTime: 1/24/2025  4:51 PM  ED Room: PR1/PR1        HPI:  1/24/25, Time: 1:26 PM JACINTO Pickard is a 34 y.o. male presenting to the ED for chest pain, beginning about a week ago.  The complaint has been intermittent, moderate in severity, and worsened by nothing.  Patient provides history in the emergency department today.  Describes this epigastric and lower sternal pain intermittently over the last week or so.  States that it is generally worsened with eating and sometimes with exertion.  Initially thought it was heartburn as he describes it as a burning type sensation however it did not go away and he saw his PCP today.  PCP reports some EKG changes which is what prompted emergency department evaluation today.  Patient currently denies any chest pain and describes it more as an epigastric pain.  Denies any prior cardiac or pulmonary pathology.  No abdominal surgeries in the past however has had some endoscopies.  Afebrile without recent travel or sick contacts.  No recent change in diet, medication, or activity.  Patient denies any NSAID use however does report that he took some diclofenac consistently for a month back in October 2024.  Denies drinking any alcohol however he does have a history of alcoholism and has not drink other than a couple sips of alcohol intermittently since 2022.  Patient denies all other symptoms at this time.    Review of Systems:   Pertinent positives and negatives are stated within HPI, all other systems reviewed and are negative.          --------------------------------------------- PAST HISTORY ---------------------------------------------  Past Medical History:  has a past medical history of Acid reflux, Alcohol related seizure (HCC), Cephalgia, Erectile dysfunction, and Sinus disorder.    Past Surgical History:  has a past surgical history that includes Cystoscopy

## 2025-02-13 ENCOUNTER — TELEPHONE (OUTPATIENT)
Dept: ENT CLINIC | Age: 35
End: 2025-02-13

## 2025-03-07 ENCOUNTER — HOSPITAL ENCOUNTER (EMERGENCY)
Age: 35
Discharge: LEFT BEFORE DISCHARGE | End: 2025-03-07
Payer: MEDICAID

## 2025-03-07 ENCOUNTER — APPOINTMENT (OUTPATIENT)
Dept: CT IMAGING | Age: 35
End: 2025-03-07
Payer: MEDICAID

## 2025-03-07 VITALS
OXYGEN SATURATION: 100 % | DIASTOLIC BLOOD PRESSURE: 94 MMHG | TEMPERATURE: 98 F | SYSTOLIC BLOOD PRESSURE: 131 MMHG | RESPIRATION RATE: 16 BRPM | HEART RATE: 89 BPM

## 2025-03-07 DIAGNOSIS — R04.0 EPISTAXIS: Primary | ICD-10-CM

## 2025-03-07 LAB
ALBUMIN SERPL-MCNC: 4.8 G/DL (ref 3.5–5.2)
ALP SERPL-CCNC: 121 U/L (ref 40–129)
ALT SERPL-CCNC: 73 U/L (ref 0–40)
ANION GAP SERPL CALCULATED.3IONS-SCNC: 12 MMOL/L (ref 7–16)
AST SERPL-CCNC: 33 U/L (ref 0–39)
BASOPHILS # BLD: 0.01 K/UL (ref 0–0.2)
BASOPHILS NFR BLD: 0 % (ref 0–2)
BILIRUB SERPL-MCNC: 0.5 MG/DL (ref 0–1.2)
BUN SERPL-MCNC: 9 MG/DL (ref 6–20)
CALCIUM SERPL-MCNC: 9.8 MG/DL (ref 8.6–10.2)
CHLORIDE SERPL-SCNC: 102 MMOL/L (ref 98–107)
CO2 SERPL-SCNC: 24 MMOL/L (ref 22–29)
CREAT SERPL-MCNC: 0.8 MG/DL (ref 0.7–1.2)
EOSINOPHIL # BLD: 0.05 K/UL (ref 0.05–0.5)
EOSINOPHILS RELATIVE PERCENT: 1 % (ref 0–6)
ERYTHROCYTE [DISTWIDTH] IN BLOOD BY AUTOMATED COUNT: 11.8 % (ref 11.5–15)
GFR, ESTIMATED: >90 ML/MIN/1.73M2
GLUCOSE SERPL-MCNC: 107 MG/DL (ref 74–99)
HCT VFR BLD AUTO: 43.9 % (ref 37–54)
HGB BLD-MCNC: 14.3 G/DL (ref 12.5–16.5)
IMM GRANULOCYTES # BLD AUTO: 0.03 K/UL (ref 0–0.58)
IMM GRANULOCYTES NFR BLD: 0 % (ref 0–5)
INR PPP: 1.1
LYMPHOCYTES NFR BLD: 1.36 K/UL (ref 1.5–4)
LYMPHOCYTES RELATIVE PERCENT: 20 % (ref 20–42)
MCH RBC QN AUTO: 29.4 PG (ref 26–35)
MCHC RBC AUTO-ENTMCNC: 32.6 G/DL (ref 32–34.5)
MCV RBC AUTO: 90.3 FL (ref 80–99.9)
MONOCYTES NFR BLD: 0.61 K/UL (ref 0.1–0.95)
MONOCYTES NFR BLD: 9 % (ref 2–12)
NEUTROPHILS NFR BLD: 71 % (ref 43–80)
NEUTS SEG NFR BLD: 4.93 K/UL (ref 1.8–7.3)
PARTIAL THROMBOPLASTIN TIME: 32.6 SEC (ref 24.5–35.1)
PLATELET # BLD AUTO: 193 K/UL (ref 130–450)
PMV BLD AUTO: 10.2 FL (ref 7–12)
POTASSIUM SERPL-SCNC: 4 MMOL/L (ref 3.5–5)
PROT SERPL-MCNC: 8.1 G/DL (ref 6.4–8.3)
PROTHROMBIN TIME: 11.9 SEC (ref 9.3–12.4)
RBC # BLD AUTO: 4.86 M/UL (ref 3.8–5.8)
SODIUM SERPL-SCNC: 138 MMOL/L (ref 132–146)
WBC OTHER # BLD: 7 K/UL (ref 4.5–11.5)

## 2025-03-07 PROCEDURE — 85610 PROTHROMBIN TIME: CPT

## 2025-03-07 PROCEDURE — 6360000004 HC RX CONTRAST MEDICATION: Performed by: RADIOLOGY

## 2025-03-07 PROCEDURE — 85025 COMPLETE CBC W/AUTO DIFF WBC: CPT

## 2025-03-07 PROCEDURE — 80053 COMPREHEN METABOLIC PANEL: CPT

## 2025-03-07 PROCEDURE — 70491 CT SOFT TISSUE NECK W/DYE: CPT

## 2025-03-07 PROCEDURE — 96374 THER/PROPH/DIAG INJ IV PUSH: CPT

## 2025-03-07 PROCEDURE — 30901 CONTROL OF NOSEBLEED: CPT

## 2025-03-07 PROCEDURE — 99285 EMERGENCY DEPT VISIT HI MDM: CPT

## 2025-03-07 PROCEDURE — 85730 THROMBOPLASTIN TIME PARTIAL: CPT

## 2025-03-07 PROCEDURE — 6370000000 HC RX 637 (ALT 250 FOR IP): Performed by: PHYSICIAN ASSISTANT

## 2025-03-07 PROCEDURE — 70496 CT ANGIOGRAPHY HEAD: CPT

## 2025-03-07 PROCEDURE — 6360000002 HC RX W HCPCS: Performed by: PHYSICIAN ASSISTANT

## 2025-03-07 RX ORDER — OXYMETAZOLINE HYDROCHLORIDE 0.05 G/100ML
2 SPRAY NASAL ONCE
Status: COMPLETED | OUTPATIENT
Start: 2025-03-07 | End: 2025-03-07

## 2025-03-07 RX ORDER — DEXAMETHASONE SODIUM PHOSPHATE 4 MG/ML
4 INJECTION, SOLUTION INTRA-ARTICULAR; INTRALESIONAL; INTRAMUSCULAR; INTRAVENOUS; SOFT TISSUE ONCE
Status: COMPLETED | OUTPATIENT
Start: 2025-03-07 | End: 2025-03-07

## 2025-03-07 RX ORDER — IOPAMIDOL 755 MG/ML
75 INJECTION, SOLUTION INTRAVASCULAR
Status: COMPLETED | OUTPATIENT
Start: 2025-03-07 | End: 2025-03-07

## 2025-03-07 RX ADMIN — DEXAMETHASONE SODIUM PHOSPHATE 4 MG: 4 INJECTION, SOLUTION INTRAMUSCULAR; INTRAVENOUS at 17:42

## 2025-03-07 RX ADMIN — IOPAMIDOL 75 ML: 755 INJECTION, SOLUTION INTRAVENOUS at 19:32

## 2025-03-07 RX ADMIN — OXYMETAZOLINE HYDROCHLORIDE 2 SPRAY: 0.5 SPRAY NASAL at 17:44

## 2025-03-07 ASSESSMENT — PAIN SCALES - GENERAL: PAINLEVEL_OUTOF10: 4

## 2025-03-07 ASSESSMENT — PAIN - FUNCTIONAL ASSESSMENT: PAIN_FUNCTIONAL_ASSESSMENT: 0-10

## 2025-03-07 ASSESSMENT — ENCOUNTER SYMPTOMS
STRIDOR: 0
COLOR CHANGE: 0
SHORTNESS OF BREATH: 1
TROUBLE SWALLOWING: 0

## 2025-03-07 NOTE — ED NOTES
Department of Emergency Medicine  FIRST PROVIDER TRIAGE NOTE             Independent MLP           3/7/25  1:19 PM EST    Date of Encounter: 3/7/25   MRN: [id number]      HPI: Dominic Pickard is a 34 y.o. male who presents to the ED for Epistaxis (Onset 1115) and Dizziness     SINUS SURGERY AT [facility] 2/28.  LATER THIS MORNING, DEVELOPED NOSE BLEED.     ROS: Negative for cp or sob.    PE: Gen Appearance/Constitutional: alert  HEENT: NC/NT. PERRLA,  Airway patent.    Provider-Patient relationship only established for Provider In Triage (PIT)  Full assessment, HPI and examination not performed, therefore, it is not yet possible to state whether or not an emergency medical condition exists   Focused assessment only during triage. This is not a comprehensive evaluation due to no physical ER space, staff shortage and high numbers of boarding patients in the ER.       Initial Plan of Care: All treatment areas with department are currently occupied. Plan to order/Initiate the following while awaiting opening in ED.  Initiate Treatment-Testing, Proceed toTreatment Area When Bed Available for ED Attending/MLP to Continue Care    Electronically signed by ELLEN Alford CNP   DD: 3/7/25      Negro Armendariz APRN - CNP  03/07/25 5649

## 2025-03-07 NOTE — ED PROVIDER NOTES
-------------------------------------------------  All laboratory and radiology results have been personally reviewed by myself   LABS:  Results for orders placed or performed during the hospital encounter of 03/07/25   CBC with Auto Differential   Result Value Ref Range    WBC 7.0 4.5 - 11.5 k/uL    RBC 4.86 3.80 - 5.80 m/uL    Hemoglobin 14.3 12.5 - 16.5 g/dL    Hematocrit 43.9 37.0 - 54.0 %    MCV 90.3 80.0 - 99.9 fL    MCH 29.4 26.0 - 35.0 pg    MCHC 32.6 32.0 - 34.5 g/dL    RDW 11.8 11.5 - 15.0 %    Platelets 193 130 - 450 k/uL    MPV 10.2 7.0 - 12.0 fL    Neutrophils % 71 43.0 - 80.0 %    Lymphocytes % 20 20.0 - 42.0 %    Monocytes % 9 2.0 - 12.0 %    Eosinophils % 1 0 - 6 %    Basophils % 0 0.0 - 2.0 %    Immature Granulocytes % 0 0.0 - 5.0 %    Neutrophils Absolute 4.93 1.80 - 7.30 k/uL    Lymphocytes Absolute 1.36 (L) 1.50 - 4.00 k/uL    Monocytes Absolute 0.61 0.10 - 0.95 k/uL    Eosinophils Absolute 0.05 0.05 - 0.50 k/uL    Basophils Absolute 0.01 0.00 - 0.20 k/uL    Immature Granulocytes Absolute 0.03 0.00 - 0.58 k/uL   Comprehensive Metabolic Panel   Result Value Ref Range    Sodium 138 132 - 146 mmol/L    Potassium 4.0 3.5 - 5.0 mmol/L    Chloride 102 98 - 107 mmol/L    CO2 24 22 - 29 mmol/L    Anion Gap 12 7 - 16 mmol/L    Glucose 107 (H) 74 - 99 mg/dL    BUN 9 6 - 20 mg/dL    Creatinine 0.8 0.70 - 1.20 mg/dL    Est, Glom Filt Rate >90 >60 mL/min/1.73m2    Calcium 9.8 8.6 - 10.2 mg/dL    Total Protein 8.1 6.4 - 8.3 g/dL    Albumin 4.8 3.5 - 5.2 g/dL    Total Bilirubin 0.5 0.0 - 1.2 mg/dL    Alkaline Phosphatase 121 40 - 129 U/L    ALT 73 (H) 0 - 40 U/L    AST 33 0 - 39 U/L   APTT   Result Value Ref Range    APTT 32.6 24.5 - 35.1 sec   Protime-INR   Result Value Ref Range    Protime 11.9 9.3 - 12.4 sec    INR 1.1        RADIOLOGY:  Interpreted by Radiologist.  CT SOFT TISSUE NECK W CONTRAST    (Results Pending)   CTA HEAD W CONTRAST    (Results Pending)       ------------------------- NURSING NOTES AND

## 2025-03-07 NOTE — CONSULTS
seen.  The nose was packed.    ASSESSMENT:  34 y.o. male with B/L epistaxis s/p s/p b/l revision total ethmoidectomies, b/l revision sphenoid sinusotomies, b/l inferior turbinate outfracture 2/28/25 at [facility].    PLAN:  Patient cauterized b/l and afrin soaked fibrillar and sinufoam placed with hemorrhage control.   Mustache dressing applied. Instructed patient to report to Emergency Department if becomes soaked within 5 minutes.  Hemotympanum right ear 2/2 epistaxis congestion and should resolve with epistaxis resolvent  Dissolvable packing placed. Keep packing in place. This will not need to be removed.   Recommend discharge with stool softeners to prevent straining  As patient concern for congestion recommend 10 mg IV decadron   Hold blood thinners if possible while packing in place. Defer decision to primary team.   Minimal oozing is to be expected over next several days as packing dissolves. Please notify ENT resident of any profuse bleeding anteriorly or coughing up bright red blood clots. If home, return to Emergency department for profuse bleeding anteriorly or coughing up bright red blood clots  Transfuse per protocol for Hgb <7   Nursing can spray afrin liberally in both nares and hold pressure for any persistent bleeding.   Maintain HOB elevated  No nose blowing or straining  Sneeze with mouth open  Tight blood pressure control  Keep outpatient follow up appointment with ENT   Pain control and medical management per primary team  Recommend patient keep follow up appointment at [facility] with surgeon who performed his recent sinus surgery.   Please contact on call ENT resident with any questions or concerns      Patient seen and examined by resident, discussed with attending on call       Electronically signed by Ginny Garcia DO on 3/7/25 at 4:56 PM EST

## 2025-03-08 NOTE — ED NOTES
Discharge instructions given, medications and follow up instructions reviewed. Patient verbalized understanding, no other noted or stated problems at this time. Patient will follow up with physicians as directed  Patient signed AMA paperwork.

## 2025-05-02 ENCOUNTER — APPOINTMENT (OUTPATIENT)
Dept: CT IMAGING | Age: 35
End: 2025-05-02
Attending: STUDENT IN AN ORGANIZED HEALTH CARE EDUCATION/TRAINING PROGRAM
Payer: MEDICAID

## 2025-05-02 ENCOUNTER — HOSPITAL ENCOUNTER (EMERGENCY)
Age: 35
Discharge: HOME | End: 2025-05-02
Attending: STUDENT IN AN ORGANIZED HEALTH CARE EDUCATION/TRAINING PROGRAM
Payer: MEDICAID

## 2025-05-02 ENCOUNTER — APPOINTMENT (OUTPATIENT)
Dept: GENERAL RADIOLOGY | Age: 35
End: 2025-05-02
Payer: MEDICAID

## 2025-05-02 VITALS
TEMPERATURE: 98.4 F | HEIGHT: 65 IN | WEIGHT: 135 LBS | DIASTOLIC BLOOD PRESSURE: 94 MMHG | RESPIRATION RATE: 16 BRPM | BODY MASS INDEX: 22.49 KG/M2 | HEART RATE: 84 BPM | SYSTOLIC BLOOD PRESSURE: 137 MMHG | OXYGEN SATURATION: 100 %

## 2025-05-02 DIAGNOSIS — R51.9 NONINTRACTABLE HEADACHE, UNSPECIFIED CHRONICITY PATTERN, UNSPECIFIED HEADACHE TYPE: Primary | ICD-10-CM

## 2025-05-02 LAB
ALBUMIN SERPL-MCNC: 4.7 G/DL (ref 3.5–5.2)
ALP SERPL-CCNC: 92 U/L (ref 40–129)
ALT SERPL-CCNC: 50 U/L (ref 0–50)
ANION GAP SERPL CALCULATED.3IONS-SCNC: 12 MMOL/L (ref 7–16)
AST SERPL-CCNC: 31 U/L (ref 0–50)
BASOPHILS # BLD: 0 K/UL (ref 0–0.2)
BASOPHILS NFR BLD: 0 % (ref 0–2)
BILIRUB SERPL-MCNC: 0.5 MG/DL (ref 0–1.2)
BUN SERPL-MCNC: 13 MG/DL (ref 6–20)
CALCIUM SERPL-MCNC: 9.7 MG/DL (ref 8.6–10)
CHLORIDE SERPL-SCNC: 104 MMOL/L (ref 98–107)
CO2 SERPL-SCNC: 22 MMOL/L (ref 22–29)
CREAT SERPL-MCNC: 1 MG/DL (ref 0.7–1.2)
EKG ATRIAL RATE: 63 BPM
EKG P AXIS: 43 DEGREES
EKG P-R INTERVAL: 168 MS
EKG Q-T INTERVAL: 390 MS
EKG QRS DURATION: 96 MS
EKG QTC CALCULATION (BAZETT): 399 MS
EKG R AXIS: 43 DEGREES
EKG T AXIS: 41 DEGREES
EKG VENTRICULAR RATE: 63 BPM
EOSINOPHIL # BLD: 0.04 K/UL (ref 0.05–0.5)
EOSINOPHILS RELATIVE PERCENT: 1 % (ref 0–6)
ERYTHROCYTE [DISTWIDTH] IN BLOOD BY AUTOMATED COUNT: 11.9 % (ref 11.5–15)
GFR, ESTIMATED: >90 ML/MIN/1.73M2
GLUCOSE SERPL-MCNC: 91 MG/DL (ref 74–99)
HCT VFR BLD AUTO: 39.7 % (ref 37–54)
HGB BLD-MCNC: 13.3 G/DL (ref 12.5–16.5)
LYMPHOCYTES NFR BLD: 2.07 K/UL (ref 1.5–4)
LYMPHOCYTES RELATIVE PERCENT: 43 % (ref 20–42)
MCH RBC QN AUTO: 30.1 PG (ref 26–35)
MCHC RBC AUTO-ENTMCNC: 33.5 G/DL (ref 32–34.5)
MCV RBC AUTO: 89.8 FL (ref 80–99.9)
MONOCYTES NFR BLD: 0.5 K/UL (ref 0.1–0.95)
MONOCYTES NFR BLD: 10 % (ref 2–12)
NEUTROPHILS NFR BLD: 46 % (ref 43–80)
NEUTS SEG NFR BLD: 2.19 K/UL (ref 1.8–7.3)
PLATELET # BLD AUTO: 162 K/UL (ref 130–450)
PMV BLD AUTO: 10.8 FL (ref 7–12)
POTASSIUM SERPL-SCNC: 4.2 MMOL/L (ref 3.5–5.1)
PROT SERPL-MCNC: 7.4 G/DL (ref 6.4–8.3)
RBC # BLD AUTO: 4.42 M/UL (ref 3.8–5.8)
RBC # BLD: NORMAL 10*6/UL
SODIUM SERPL-SCNC: 138 MMOL/L (ref 136–145)
TROPONIN I SERPL HS-MCNC: <6 NG/L (ref 0–22)
WBC OTHER # BLD: 4.8 K/UL (ref 4.5–11.5)

## 2025-05-02 PROCEDURE — 93005 ELECTROCARDIOGRAM TRACING: CPT | Performed by: STUDENT IN AN ORGANIZED HEALTH CARE EDUCATION/TRAINING PROGRAM

## 2025-05-02 PROCEDURE — 99285 EMERGENCY DEPT VISIT HI MDM: CPT

## 2025-05-02 PROCEDURE — 85025 COMPLETE CBC W/AUTO DIFF WBC: CPT

## 2025-05-02 PROCEDURE — 80053 COMPREHEN METABOLIC PANEL: CPT

## 2025-05-02 PROCEDURE — 70450 CT HEAD/BRAIN W/O DYE: CPT

## 2025-05-02 PROCEDURE — 6370000000 HC RX 637 (ALT 250 FOR IP): Performed by: STUDENT IN AN ORGANIZED HEALTH CARE EDUCATION/TRAINING PROGRAM

## 2025-05-02 PROCEDURE — 93010 ELECTROCARDIOGRAM REPORT: CPT | Performed by: INTERNAL MEDICINE

## 2025-05-02 PROCEDURE — 71045 X-RAY EXAM CHEST 1 VIEW: CPT

## 2025-05-02 PROCEDURE — 84484 ASSAY OF TROPONIN QUANT: CPT

## 2025-05-02 RX ORDER — DIPHENHYDRAMINE HYDROCHLORIDE 50 MG/ML
25 INJECTION, SOLUTION INTRAMUSCULAR; INTRAVENOUS ONCE
Status: DISCONTINUED | OUTPATIENT
Start: 2025-05-02 | End: 2025-05-02

## 2025-05-02 RX ORDER — ACETAMINOPHEN 500 MG
1000 TABLET ORAL ONCE
Status: COMPLETED | OUTPATIENT
Start: 2025-05-02 | End: 2025-05-02

## 2025-05-02 RX ORDER — METOCLOPRAMIDE HYDROCHLORIDE 5 MG/ML
10 INJECTION INTRAMUSCULAR; INTRAVENOUS ONCE
Status: DISCONTINUED | OUTPATIENT
Start: 2025-05-02 | End: 2025-05-02

## 2025-05-02 RX ADMIN — ACETAMINOPHEN 1000 MG: 500 TABLET ORAL at 03:12

## 2025-05-02 ASSESSMENT — PAIN DESCRIPTION - LOCATION
LOCATION: HEAD
LOCATION: HEAD

## 2025-05-02 ASSESSMENT — PAIN DESCRIPTION - FREQUENCY: FREQUENCY: INTERMITTENT

## 2025-05-02 ASSESSMENT — PAIN SCALES - GENERAL
PAINLEVEL_OUTOF10: 3
PAINLEVEL_OUTOF10: 4

## 2025-05-02 ASSESSMENT — ENCOUNTER SYMPTOMS
SORE THROAT: 0
NAUSEA: 0
BACK PAIN: 0
COUGH: 0
ABDOMINAL PAIN: 0
PHOTOPHOBIA: 0
DIARRHEA: 0
EYE REDNESS: 1
SHORTNESS OF BREATH: 0

## 2025-05-02 ASSESSMENT — PAIN DESCRIPTION - PAIN TYPE: TYPE: ACUTE PAIN

## 2025-05-02 ASSESSMENT — PAIN - FUNCTIONAL ASSESSMENT
PAIN_FUNCTIONAL_ASSESSMENT: 0-10
PAIN_FUNCTIONAL_ASSESSMENT: ACTIVITIES ARE NOT PREVENTED

## 2025-05-02 ASSESSMENT — PAIN DESCRIPTION - DESCRIPTORS
DESCRIPTORS: POUNDING;PRESSURE;THROBBING
DESCRIPTORS: PRESSURE

## 2025-05-02 ASSESSMENT — PAIN DESCRIPTION - ORIENTATION
ORIENTATION: MID
ORIENTATION: OTHER (COMMENT)

## 2025-05-02 NOTE — ED PROVIDER NOTES
[facility] EMERGENCY DEPARTMENT  EMERGENCY DEPARTMENT ENCOUNTER        Pt Name: Dominic Pickard  MRN: [id number]  Birthdate 1990  Date of evaluation: 5/2/2025  Provider: Yuliya Hoang MD  PCP: Mo Castano III, DO  Note Started: 2:23 AM EDT 5/2/25    HPI  34 y.o. male presenting for headache.  Patient had sinus surgery 2 months ago.he complains of intermittent migraine for the past week.  He states last Thursday at work, he had migraine was forgetting things.  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  Since then, he has had 2 additional migraines which go away and then come back.  He took Excedrin last week and took a Tylenol today.  He complains of eyes being bloodshot after golfing.  He complains of feeling sick and rundown.  Complains of his chest hurting, slightly heavy and feels aware of his breathing.  He complains of nasal congestion.  Complains of some chest burning for a while.  Complains of feeling spaced out having trouble getting words out.          --------------------------------------------- PAST HISTORY ---------------------------------------------  Past Medical History:  has a past medical history of Acid reflux, Alcohol related seizure (HCC), Cephalgia, Erectile dysfunction, and Sinus disorder.    Past Surgical History:  has a past surgical history that includes Cystoscopy (2008); Upper gastrointestinal endoscopy (N/A, 5/3/2022); and Sinus endoscopy (N/A, 9/26/2022).    Social History:  reports that he has been smoking. He has never used smokeless tobacco. He reports that he does not currently use alcohol after a past usage of about 15.0 standard drinks of alcohol per week. He reports that he does not currently use drugs after having used the following drugs: Cocaine. Frequency: 2.00 times per week.    Family History: family history includes High Cholesterol in his father.     The patient’s home medications have been

## 2025-06-09 ENCOUNTER — TELEPHONE (OUTPATIENT)
Dept: PODIATRY | Age: 35
End: 2025-06-09

## 2025-06-09 NOTE — TELEPHONE ENCOUNTER
Pt jeffnd was seen at [facility] on 5/26/25 for athletes foot on R foot - placed on cyclocort cream to apply - pt went on vacation to [place] & isn't sure if he had a reaction to the cream or if it got sunburnt on the R foot (pt was at the beach on Friday) - pt states its red/looks like a burn & became more itchy.  Pt wants to know if he should continue his cream until his appt with Dr Caballero in July or discontinue.  Please call pt to advise due to pt care [phone]

## 2025-06-25 ENCOUNTER — TELEPHONE (OUTPATIENT)
Dept: ENT CLINIC | Age: 35
End: 2025-06-25

## 2025-06-25 NOTE — TELEPHONE ENCOUNTER
Requesting a sooner appt. Pt is stating, \"he went to [facility] for surgery and 1 week PO he had a bleed in which he had to go to the ED. He believes the DO who seen him damaged something in his nose. The bleeding is still ongoing, there is swelling around nose and eyes.\"     Please advise on scheduling,     Electronically signed by Berta Díaz on 6/25/2025 at 1:34 PM

## 2025-06-25 NOTE — TELEPHONE ENCOUNTER
Dr Cardenas recommending patient follow up in [place] for post op concerns related to the surgery he had at [facility]. Patient notified. Patient wants to confirm he still has apt scheduled in Oct as he would like to transfer care back here in the future. Reinforced to patient again he will need to contact physician at [facility] regarding current issues.

## 2025-07-08 ENCOUNTER — OFFICE VISIT (OUTPATIENT)
Dept: PODIATRY | Age: 35
End: 2025-07-08
Payer: MEDICAID

## 2025-07-08 VITALS
DIASTOLIC BLOOD PRESSURE: 83 MMHG | TEMPERATURE: 97.2 F | WEIGHT: 134 LBS | SYSTOLIC BLOOD PRESSURE: 122 MMHG | BODY MASS INDEX: 22.3 KG/M2

## 2025-07-08 DIAGNOSIS — B35.1 TINEA UNGUIUM: Primary | ICD-10-CM

## 2025-07-08 DIAGNOSIS — M79.675 PAIN IN LEFT TOE(S): ICD-10-CM

## 2025-07-08 DIAGNOSIS — M79.674 PAIN IN RIGHT TOE(S): ICD-10-CM

## 2025-07-08 DIAGNOSIS — B35.3 TINEA PEDIS OF RIGHT FOOT: ICD-10-CM

## 2025-07-08 PROCEDURE — 99203 OFFICE O/P NEW LOW 30 MIN: CPT | Performed by: PODIATRIST

## 2025-07-08 RX ORDER — NYSTATIN 100000 U/G
CREAM TOPICAL
Qty: 30 G | Refills: 1 | Status: SHIPPED | OUTPATIENT
Start: 2025-07-08

## 2025-07-08 RX ORDER — TERBINAFINE HYDROCHLORIDE 250 MG/1
250 TABLET ORAL DAILY
Qty: 90 TABLET | Refills: 0 | Status: SHIPPED | OUTPATIENT
Start: 2025-07-08 | End: 2025-10-06

## 2025-07-08 NOTE — PROGRESS NOTES
25  Dominic Pickard : 1990 Sex: male  Age: 34 y.o.    Patient was referred by Mo Castano III, DO    Chief Complaint   Patient presents with    Referral - General    New Patient    Foot Pain     Pt was seen thru [facility] for possible sunburn or AF  Was given a cream which made his feet burn  So he stopped   Mo Castano III, DO  LOV 25       SUBJECTIVE  History of Present Illness  The patient is a 34-year-old new male patient seen for a rash on his right foot.    He reports that his left foot is in good condition, but he has been experiencing issues with his right foot for several years. The problem began between the toes and has progressively worsened over time, particularly after a year-long stay in [place]. Despite intermittent use of a prescribed cream, the condition has not improved. In fact, it has spread to all his toenails and the entire foot, even extending to the sides. He describes the skin as peeling off but does not report any itching. He was previously treated at [facility] with ciclopirox, an antifungal cream, which he used for about two weeks. However, he experienced a severe burning sensation when he applied the cream after spending a day at the beach in [place].    Supplemental Information  He is doing well with his sinuses and migraine issues. His family doctor is Dr. Castano. He is not on any medicines except Cialis as needed.    ALLERGIES  The patient is allergic to CEFZIL.    MEDICATIONS  Current: Cialis as needed.  Past: Ciclopirox.    Foot Pain       Review of Systems  Const: Denies constitutional symptoms  Musculo: Denies symptoms other than stated above  Skin: Denies symptoms other than stated above       Current Outpatient Medications:     terbinafine (LAMISIL) 250 MG tablet, Take 1 tablet by mouth daily, Disp: 90 tablet, Rfl: 0    nystatin (MYCOSTATIN) 242674 UNIT/GM cream, Apply topically 2 times daily., Disp: 30 g, Rfl: 1    tadalafil (CIALIS) 20 MG

## 2025-08-13 ENCOUNTER — OFFICE VISIT (OUTPATIENT)
Dept: FAMILY MEDICINE CLINIC | Age: 35
End: 2025-08-13
Payer: MEDICAID

## 2025-08-13 VITALS
BODY MASS INDEX: 22.49 KG/M2 | HEIGHT: 65 IN | SYSTOLIC BLOOD PRESSURE: 120 MMHG | OXYGEN SATURATION: 98 % | TEMPERATURE: 98.2 F | RESPIRATION RATE: 16 BRPM | WEIGHT: 135 LBS | HEART RATE: 68 BPM | DIASTOLIC BLOOD PRESSURE: 78 MMHG

## 2025-08-13 DIAGNOSIS — M25.50 ARTHRALGIA, UNSPECIFIED JOINT: ICD-10-CM

## 2025-08-13 DIAGNOSIS — M79.10 MYALGIA: ICD-10-CM

## 2025-08-13 DIAGNOSIS — R30.0 DYSURIA: Primary | ICD-10-CM

## 2025-08-13 DIAGNOSIS — M54.50 ACUTE BILATERAL LOW BACK PAIN WITHOUT SCIATICA: ICD-10-CM

## 2025-08-13 LAB
ALBUMIN: 4.6 G/DL (ref 3.5–5.2)
ALP BLD-CCNC: 77 U/L (ref 40–129)
ALT SERPL-CCNC: 38 U/L (ref 0–50)
ANION GAP SERPL CALCULATED.3IONS-SCNC: 10 MMOL/L (ref 7–16)
AST SERPL-CCNC: 31 U/L (ref 0–50)
BASOPHILS ABSOLUTE: 0.02 K/UL (ref 0–0.2)
BASOPHILS RELATIVE PERCENT: 1 % (ref 0–2)
BILIRUB SERPL-MCNC: 0.5 MG/DL (ref 0–1.2)
BUN BLDV-MCNC: 9 MG/DL (ref 6–20)
C-REACTIVE PROTEIN: <3 MG/L (ref 0–5)
CALCIUM SERPL-MCNC: 9.7 MG/DL (ref 8.6–10)
CHLORIDE BLD-SCNC: 104 MMOL/L (ref 98–107)
CO2: 25 MMOL/L (ref 22–29)
CREAT SERPL-MCNC: 0.8 MG/DL (ref 0.7–1.2)
EOSINOPHILS ABSOLUTE: 0.12 K/UL (ref 0.05–0.5)
EOSINOPHILS RELATIVE PERCENT: 3 % (ref 0–6)
GFR, ESTIMATED: >90 ML/MIN/1.73M2
GLUCOSE BLD-MCNC: 98 MG/DL (ref 74–99)
HCT VFR BLD CALC: 41.3 % (ref 37–54)
HEMOGLOBIN: 14.3 G/DL (ref 12.5–16.5)
IMMATURE GRANULOCYTES %: 0 % (ref 0–5)
IMMATURE GRANULOCYTES ABSOLUTE: <0.03 K/UL (ref 0–0.58)
LYMPHOCYTES ABSOLUTE: 1.75 K/UL (ref 1.5–4)
LYMPHOCYTES RELATIVE PERCENT: 41 % (ref 20–42)
MAGNESIUM: 1.8 MG/DL (ref 1.6–2.6)
MCH RBC QN AUTO: 30.9 PG (ref 26–35)
MCHC RBC AUTO-ENTMCNC: 34.6 G/DL (ref 32–34.5)
MCV RBC AUTO: 89.2 FL (ref 80–99.9)
MONOCYTES ABSOLUTE: 0.46 K/UL (ref 0.1–0.95)
MONOCYTES RELATIVE PERCENT: 11 % (ref 2–12)
NEUTROPHILS ABSOLUTE: 1.88 K/UL (ref 1.8–7.3)
NEUTROPHILS RELATIVE PERCENT: 44 % (ref 43–80)
PDW BLD-RTO: 11.9 % (ref 11.5–15)
PLATELET # BLD: 159 K/UL (ref 130–450)
PMV BLD AUTO: 11.4 FL (ref 7–12)
POTASSIUM SERPL-SCNC: 4.7 MMOL/L (ref 3.5–5.1)
RBC # BLD: 4.63 M/UL (ref 3.8–5.8)
SED RATE, AUTOMATED: 0 MM/HR (ref 0–15)
SODIUM BLD-SCNC: 139 MMOL/L (ref 136–145)
TOTAL CK: 130 U/L (ref 0–190)
TOTAL PROTEIN: 7.3 G/DL (ref 6.4–8.3)
URIC ACID: 5.9 MG/DL (ref 3.4–7)
WBC # BLD: 4.2 K/UL (ref 4.5–11.5)

## 2025-08-13 PROCEDURE — 99204 OFFICE O/P NEW MOD 45 MIN: CPT | Performed by: PHYSICIAN ASSISTANT

## 2025-08-14 LAB — MONONUCLEOSIS SCREEN: NEGATIVE

## 2025-08-16 LAB — BORRELIA BURGDORFERI ABS TOTAL: 0.21 IV

## 2025-08-27 ENCOUNTER — APPOINTMENT (OUTPATIENT)
Dept: CT IMAGING | Age: 35
End: 2025-08-27
Payer: MEDICAID

## 2025-08-27 ENCOUNTER — HOSPITAL ENCOUNTER (EMERGENCY)
Age: 35
Discharge: HOME | End: 2025-08-27
Payer: MEDICAID

## 2025-08-27 VITALS
RESPIRATION RATE: 18 BRPM | WEIGHT: 135 LBS | DIASTOLIC BLOOD PRESSURE: 88 MMHG | HEIGHT: 65 IN | SYSTOLIC BLOOD PRESSURE: 127 MMHG | BODY MASS INDEX: 22.49 KG/M2 | HEART RATE: 62 BPM | OXYGEN SATURATION: 98 % | TEMPERATURE: 98 F

## 2025-08-27 DIAGNOSIS — R51.9 SINUS HEADACHE: ICD-10-CM

## 2025-08-27 DIAGNOSIS — M54.81 CERVICO-OCCIPITAL NEURALGIA: ICD-10-CM

## 2025-08-27 DIAGNOSIS — R51.9 INTRACTABLE HEADACHE, UNSPECIFIED CHRONICITY PATTERN, UNSPECIFIED HEADACHE TYPE: Primary | ICD-10-CM

## 2025-08-27 DIAGNOSIS — M79.10 MYALGIA: ICD-10-CM

## 2025-08-27 LAB
ALBUMIN SERPL-MCNC: 4.8 G/DL (ref 3.5–5.2)
ALP SERPL-CCNC: 85 U/L (ref 40–129)
ALT SERPL-CCNC: 33 U/L (ref 0–50)
ANION GAP SERPL CALCULATED.3IONS-SCNC: 13 MMOL/L (ref 7–16)
AST SERPL-CCNC: 29 U/L (ref 0–50)
BASOPHILS # BLD: 0.02 K/UL (ref 0–0.2)
BASOPHILS NFR BLD: 0 % (ref 0–2)
BILIRUB SERPL-MCNC: 0.6 MG/DL (ref 0–1.2)
BUN SERPL-MCNC: 13 MG/DL (ref 6–20)
CALCIUM SERPL-MCNC: 9.8 MG/DL (ref 8.6–10)
CHLORIDE SERPL-SCNC: 104 MMOL/L (ref 98–107)
CK SERPL-CCNC: 179 U/L (ref 0–190)
CO2 SERPL-SCNC: 23 MMOL/L (ref 22–29)
CREAT SERPL-MCNC: 0.9 MG/DL (ref 0.7–1.2)
EOSINOPHIL # BLD: 0.12 K/UL (ref 0.05–0.5)
EOSINOPHILS RELATIVE PERCENT: 2 % (ref 0–6)
ERYTHROCYTE [DISTWIDTH] IN BLOOD BY AUTOMATED COUNT: 11.6 % (ref 11.5–15)
GFR, ESTIMATED: >90 ML/MIN/1.73M2
GLUCOSE SERPL-MCNC: 107 MG/DL (ref 74–99)
HCT VFR BLD AUTO: 40.7 % (ref 37–54)
HGB BLD-MCNC: 13.8 G/DL (ref 12.5–16.5)
IMM GRANULOCYTES # BLD AUTO: <0.03 K/UL (ref 0–0.58)
IMM GRANULOCYTES NFR BLD: 0 % (ref 0–5)
LYMPHOCYTES NFR BLD: 2.02 K/UL (ref 1.5–4)
LYMPHOCYTES RELATIVE PERCENT: 36 % (ref 20–42)
MAGNESIUM SERPL-MCNC: 1.9 MG/DL (ref 1.6–2.6)
MCH RBC QN AUTO: 30.4 PG (ref 26–35)
MCHC RBC AUTO-ENTMCNC: 33.9 G/DL (ref 32–34.5)
MCV RBC AUTO: 89.6 FL (ref 80–99.9)
MONOCYTES NFR BLD: 0.39 K/UL (ref 0.1–0.95)
MONOCYTES NFR BLD: 7 % (ref 2–12)
NEUTROPHILS NFR BLD: 54 % (ref 43–80)
NEUTS SEG NFR BLD: 3 K/UL (ref 1.8–7.3)
PLATELET # BLD AUTO: 168 K/UL (ref 130–450)
PMV BLD AUTO: 10.6 FL (ref 7–12)
POTASSIUM SERPL-SCNC: 4.3 MMOL/L (ref 3.5–5.1)
PROT SERPL-MCNC: 7.6 G/DL (ref 6.4–8.3)
RBC # BLD AUTO: 4.54 M/UL (ref 3.8–5.8)
SODIUM SERPL-SCNC: 140 MMOL/L (ref 136–145)
T4 SERPL-MCNC: 6.8 UG/DL (ref 4.5–11.7)
TSH SERPL DL<=0.05 MIU/L-ACNC: 2.9 UIU/ML (ref 0.27–4.2)
WBC OTHER # BLD: 5.6 K/UL (ref 4.5–11.5)

## 2025-08-27 PROCEDURE — 6360000004 HC RX CONTRAST MEDICATION: Performed by: RADIOLOGY

## 2025-08-27 PROCEDURE — 99285 EMERGENCY DEPT VISIT HI MDM: CPT

## 2025-08-27 PROCEDURE — 2580000003 HC RX 258: Performed by: NURSE PRACTITIONER

## 2025-08-27 PROCEDURE — 83735 ASSAY OF MAGNESIUM: CPT

## 2025-08-27 PROCEDURE — 84436 ASSAY OF TOTAL THYROXINE: CPT

## 2025-08-27 PROCEDURE — 70496 CT ANGIOGRAPHY HEAD: CPT

## 2025-08-27 PROCEDURE — 82550 ASSAY OF CK (CPK): CPT

## 2025-08-27 PROCEDURE — 70498 CT ANGIOGRAPHY NECK: CPT

## 2025-08-27 PROCEDURE — 85025 COMPLETE CBC W/AUTO DIFF WBC: CPT

## 2025-08-27 PROCEDURE — 80053 COMPREHEN METABOLIC PANEL: CPT

## 2025-08-27 PROCEDURE — 84443 ASSAY THYROID STIM HORMONE: CPT

## 2025-08-27 RX ORDER — DIPHENHYDRAMINE HYDROCHLORIDE 50 MG/ML
25 INJECTION, SOLUTION INTRAMUSCULAR; INTRAVENOUS ONCE
Status: DISCONTINUED | OUTPATIENT
Start: 2025-08-27 | End: 2025-08-27 | Stop reason: HOSPADM

## 2025-08-27 RX ORDER — IOPAMIDOL 755 MG/ML
75 INJECTION, SOLUTION INTRAVASCULAR
Status: COMPLETED | OUTPATIENT
Start: 2025-08-27 | End: 2025-08-27

## 2025-08-27 RX ORDER — DOXYCYCLINE HYCLATE 100 MG
100 TABLET ORAL 2 TIMES DAILY
Qty: 20 TABLET | Refills: 0 | Status: SHIPPED | OUTPATIENT
Start: 2025-08-27 | End: 2025-09-06

## 2025-08-27 RX ORDER — 0.9 % SODIUM CHLORIDE 0.9 %
1000 INTRAVENOUS SOLUTION INTRAVENOUS ONCE
Status: COMPLETED | OUTPATIENT
Start: 2025-08-27 | End: 2025-08-27

## 2025-08-27 RX ORDER — METOCLOPRAMIDE HYDROCHLORIDE 5 MG/ML
10 INJECTION INTRAMUSCULAR; INTRAVENOUS ONCE
Status: DISCONTINUED | OUTPATIENT
Start: 2025-08-27 | End: 2025-08-27 | Stop reason: HOSPADM

## 2025-08-27 RX ADMIN — SODIUM CHLORIDE 1000 ML: 0.9 INJECTION, SOLUTION INTRAVENOUS at 02:06

## 2025-08-27 RX ADMIN — IOPAMIDOL 75 ML: 755 INJECTION, SOLUTION INTRAVENOUS at 03:28

## 2025-08-27 ASSESSMENT — LIFESTYLE VARIABLES: HOW OFTEN DO YOU HAVE A DRINK CONTAINING ALCOHOL: MONTHLY OR LESS

## (undated) DEVICE — COUNTER NDL 30 COUNT DBL MAG

## (undated) DEVICE — TOWEL,OR,DSP,ST,BLUE,STD,6/PK,12PK/CS: Brand: MEDLINE

## (undated) DEVICE — MAGNETIC INSTR DRAPE 20X16: Brand: MEDLINE INDUSTRIES, INC.

## (undated) DEVICE — MARKER,SKIN,WI/RULER AND LABELS: Brand: MEDLINE

## (undated) DEVICE — PAD,NON-ADHERENT,2X3,STERILE,LF,1/PK: Brand: MEDLINE

## (undated) DEVICE — BLOCK BITE 60FR RUBBER ADLT DENTAL

## (undated) DEVICE — LIGHT SOURCE WHT

## (undated) DEVICE — KIT,ANTI FOG,W/SPONGE & FLUID,SOFT PACK: Brand: MEDLINE

## (undated) DEVICE — SPONGE GZ W4XL4IN RAYON POLY FILL CVR W/ NONWOVEN FAB

## (undated) DEVICE — 4-PORT MANIFOLD: Brand: NEPTUNE 2

## (undated) DEVICE — DEVICE INFL BLLN FOR DEFLATION OF CATH ACCLARENT

## (undated) DEVICE — SOLUTION IV 1000ML 0.9% SOD CHL PH 5 INJ USP VIAFLX PLAS

## (undated) DEVICE — SYRINGE, LUER LOCK, 10ML: Brand: MEDLINE

## (undated) DEVICE — NEEDLE FLTR 18GA L1.5IN MEM THK5UM BLNT DISP

## (undated) DEVICE — GRADUATE TRIANG MEASURE 1000ML BLK PRNT

## (undated) DEVICE — NEEDLE SPNL 22GA L3.5IN BLK HUB S STL REG WALL FIT STYL W/

## (undated) DEVICE — TUBING, SUCTION, 3/16" X 12', STRAIGHT: Brand: MEDLINE

## (undated) DEVICE — GLOVE ORANGE PI 8 1/2   MSG9085

## (undated) DEVICE — SYRINGE,CONTROL,LL,FINGER,GRIP: Brand: MEDLINE INDUSTRIES, INC.

## (undated) DEVICE — SYRINGE MED 50ML LUERLOCK TIP

## (undated) DEVICE — SPLINT 1524055 DOYLE II AIRWAY SET: Brand: DOYLE II ™

## (undated) DEVICE — TUBING 1912030 ENDO-SCRUB 2 5PK: Brand: ENDO-SCRUB®

## (undated) DEVICE — Device

## (undated) DEVICE — SOLUTION IV 500ML 0.9% SOD CHL PH 5 INJ USP VIAFLX PLAS

## (undated) DEVICE — SINU FOAM: Brand: SINU-FOAM

## (undated) DEVICE — SHEATH 1912010 5PK 4MM/30DEG STORZ XOMED: Brand: ENDO-SCRUB®

## (undated) DEVICE — KIT BAL DIL 16X40MM RELIEVA TRACT

## (undated) DEVICE — INTENDED FOR TISSUE SEPARATION, AND OTHER PROCEDURES THAT REQUIRE A SHARP SURGICAL BLADE TO PUNCTURE OR CUT.: Brand: BARD-PARKER ® STAINLESS STEEL BLADES

## (undated) DEVICE — DOUBLE BASIN SET: Brand: MEDLINE INDUSTRIES, INC.

## (undated) DEVICE — SURGICAL PROCEDURE PACK EENT CUST

## (undated) DEVICE — BALLOON SINUPLASTY 6X16 MM SYS RELIEVA SPINPLUS

## (undated) DEVICE — CODMAN® SURGICAL PATTIES 1/2" X 3" (1.27CM X 7.62CM): Brand: CODMAN®

## (undated) DEVICE — DEVICE INFL PRSS G INDIC DISP (MUST BE PURC IN MULTIPLES OF 5)

## (undated) DEVICE — BLADE 1884380HR QUADCUT 5PK 4.3MM X 13CM: Brand: QUADCUT®

## (undated) DEVICE — NEEDLE HYPO 25GA L1.5IN BLU POLYPR HUB S STL REG BVL STR